# Patient Record
Sex: MALE | Race: WHITE | URBAN - METROPOLITAN AREA
[De-identification: names, ages, dates, MRNs, and addresses within clinical notes are randomized per-mention and may not be internally consistent; named-entity substitution may affect disease eponyms.]

---

## 2018-06-18 ENCOUNTER — APPOINTMENT (RX ONLY)
Dept: URBAN - METROPOLITAN AREA CLINIC 299 | Facility: CLINIC | Age: 48
Setting detail: DERMATOLOGY
End: 2018-06-18

## 2018-06-18 VITALS — HEIGHT: 74 IN | WEIGHT: 315 LBS

## 2018-06-18 DIAGNOSIS — D22 MELANOCYTIC NEVI: ICD-10-CM

## 2018-06-18 DIAGNOSIS — L82.1 OTHER SEBORRHEIC KERATOSIS: ICD-10-CM

## 2018-06-18 DIAGNOSIS — B07.8 OTHER VIRAL WARTS: ICD-10-CM

## 2018-06-18 DIAGNOSIS — L81.4 OTHER MELANIN HYPERPIGMENTATION: ICD-10-CM

## 2018-06-18 DIAGNOSIS — L91.8 OTHER HYPERTROPHIC DISORDERS OF THE SKIN: ICD-10-CM

## 2018-06-18 PROBLEM — I10 ESSENTIAL (PRIMARY) HYPERTENSION: Status: ACTIVE | Noted: 2018-06-18

## 2018-06-18 PROBLEM — D22.5 MELANOCYTIC NEVI OF TRUNK: Status: ACTIVE | Noted: 2018-06-18

## 2018-06-18 PROCEDURE — ? LIQUID NITROGEN (COSMETIC)

## 2018-06-18 PROCEDURE — ? RECOMMENDATIONS

## 2018-06-18 PROCEDURE — ? SKIN TAG REMOVAL (COSMETIC)

## 2018-06-18 PROCEDURE — 99203 OFFICE O/P NEW LOW 30 MIN: CPT

## 2018-06-18 PROCEDURE — ? COUNSELING

## 2018-06-18 PROCEDURE — ? NOTED ON EXAM BUT NOT TREATED

## 2018-06-18 ASSESSMENT — LOCATION SIMPLE DESCRIPTION DERM
LOCATION SIMPLE: POSTERIOR NECK
LOCATION SIMPLE: RIGHT CHEEK
LOCATION SIMPLE: LEFT ANTERIOR NECK
LOCATION SIMPLE: RIGHT SHOULDER
LOCATION SIMPLE: LEFT SHOULDER
LOCATION SIMPLE: RIGHT TEMPLE
LOCATION SIMPLE: RIGHT FOREHEAD
LOCATION SIMPLE: RIGHT ANTERIOR NECK
LOCATION SIMPLE: ABDOMEN
LOCATION SIMPLE: RIGHT UPPER BACK

## 2018-06-18 ASSESSMENT — LOCATION DETAILED DESCRIPTION DERM
LOCATION DETAILED: LEFT POSTERIOR SHOULDER
LOCATION DETAILED: LEFT INFERIOR POSTERIOR NECK
LOCATION DETAILED: RIGHT CLAVICULAR NECK
LOCATION DETAILED: RIGHT SUPERIOR MEDIAL FOREHEAD
LOCATION DETAILED: RIGHT MEDIAL TEMPLE
LOCATION DETAILED: LEFT SUPERIOR LATERAL NECK
LOCATION DETAILED: RIGHT INFERIOR MEDIAL UPPER BACK
LOCATION DETAILED: RIGHT MEDIAL MALAR CHEEK
LOCATION DETAILED: RIGHT INFERIOR FOREHEAD
LOCATION DETAILED: LEFT INFERIOR LATERAL NECK
LOCATION DETAILED: RIGHT CENTRAL TEMPLE
LOCATION DETAILED: RIGHT POSTERIOR SHOULDER
LOCATION DETAILED: LEFT SUPERIOR ANTERIOR NECK
LOCATION DETAILED: LEFT CLAVICULAR NECK
LOCATION DETAILED: EPIGASTRIC SKIN
LOCATION DETAILED: RIGHT SUPERIOR MEDIAL MALAR CHEEK
LOCATION DETAILED: RIGHT INFERIOR LATERAL NECK
LOCATION DETAILED: RIGHT SUPERIOR LATERAL NECK

## 2018-06-18 ASSESSMENT — LOCATION ZONE DERM
LOCATION ZONE: FACE
LOCATION ZONE: TRUNK
LOCATION ZONE: NECK
LOCATION ZONE: ARM

## 2018-06-18 NOTE — PROCEDURE: RECOMMENDATIONS
Detail Level: Zone
Recommendation Preamble: The following recommendations were made during the visit:
Recommendations (Free Text): Yearly skin checks

## 2018-06-18 NOTE — PROCEDURE: SKIN TAG REMOVAL (COSMETIC)
Consent: Written consent obtained and the risks of skin tag removal was reviewed with the patient including but not limited to bleeding, pigmentary change, infection, pain, and remote possibility of scarring. Discussed that lesions appear as classic, benign skin tags and will not be sent for pathological evaluation - patient verbally expressed understanding and agrees to proceed.

## 2018-06-18 NOTE — PROCEDURE: SKIN TAG REMOVAL (COSMETIC)
Price (Use Numbers Only, No Special Characters Or $): 890 Price (Use Numbers Only, No Special Characters Or $): 069

## 2018-06-18 NOTE — PROCEDURE: MIPS QUALITY
Quality 130: Documentation Of Current Medications In The Medical Record: Current Medications Documented
Quality 226: Preventive Care And Screening: Tobacco Use: Screening And Cessation Intervention: Patient screened for tobacco and never smoked
Quality 431: Preventive Care And Screening: Unhealthy Alcohol Use - Screening: Patient screened for unhealthy alcohol use using a single question and scores less than 2 times per year
Quality 128: Preventive Care And Screening: Body Mass Index (Bmi) Screening And Follow-Up Plan: BMI documented as out of range, follow up plan not documented, reason not otherwise specified.
Detail Level: Detailed

## 2018-06-18 NOTE — PROCEDURE: LIQUID NITROGEN (COSMETIC)
Post-Care Instructions: I reviewed with the patient in detail post-care instructions. Patient is to wear sunprotection, and avoid picking at any of the treated lesions. Pt may apply Vaseline to crusted or scabbing areas.
Duration Of Freeze Thaw-Cycle (Seconds): 5
Total Number Of Lesions Treated: 3
Detail Level: Zone
Consent: The patient's consent was obtained including but not limited to risks of crusting, scabbing, blistering, scarring, darker or lighter pigmentary change, recurrence, incomplete removal and infection.
Consent: The patient's consent was obtained including but not limited to risks of crusting, scabbing, blistering, scarring, darker or lighter pigmentary change, recurrence, incomplete removal and infection. The patient understands that the procedure is cosmetic in nature and is not covered by insurance.
Number Of Freeze-Thaw Cycles: 2 freeze-thaw cycles
Detail Level: Detailed
Render Post-Care Instructions In Note?: no

## 2018-10-04 ENCOUNTER — AMBULATORY - HEALTHEAST (OUTPATIENT)
Dept: LAB | Facility: CLINIC | Age: 48
End: 2018-10-04

## 2018-10-04 ENCOUNTER — OFFICE VISIT - HEALTHEAST (OUTPATIENT)
Dept: FAMILY MEDICINE | Facility: CLINIC | Age: 48
End: 2018-10-04

## 2018-10-04 ENCOUNTER — COMMUNICATION - HEALTHEAST (OUTPATIENT)
Dept: TELEHEALTH | Facility: CLINIC | Age: 48
End: 2018-10-04

## 2018-10-04 DIAGNOSIS — Z13.21 SCREENING FOR ENDOCRINE, NUTRITIONAL, METABOLIC AND IMMUNITY DISORDER: ICD-10-CM

## 2018-10-04 DIAGNOSIS — E66.01 CLASS 3 SEVERE OBESITY DUE TO EXCESS CALORIES WITH SERIOUS COMORBIDITY AND BODY MASS INDEX (BMI) OF 45.0 TO 49.9 IN ADULT (H): ICD-10-CM

## 2018-10-04 DIAGNOSIS — Z13.228 SCREENING FOR ENDOCRINE, NUTRITIONAL, METABOLIC AND IMMUNITY DISORDER: ICD-10-CM

## 2018-10-04 DIAGNOSIS — Z71.9 HEALTH EDUCATION: ICD-10-CM

## 2018-10-04 DIAGNOSIS — E88.810 METABOLIC SYNDROME: ICD-10-CM

## 2018-10-04 DIAGNOSIS — R06.83 SNORES: ICD-10-CM

## 2018-10-04 DIAGNOSIS — Z13.0 SCREENING FOR ENDOCRINE, NUTRITIONAL, METABOLIC AND IMMUNITY DISORDER: ICD-10-CM

## 2018-10-04 DIAGNOSIS — Z13.0 SCREENING FOR DEFICIENCY ANEMIA: ICD-10-CM

## 2018-10-04 DIAGNOSIS — E55.9 AVITAMINOSIS D: ICD-10-CM

## 2018-10-04 DIAGNOSIS — R73.03 PREDIABETES: ICD-10-CM

## 2018-10-04 DIAGNOSIS — R63.5 ABNORMAL WEIGHT GAIN: ICD-10-CM

## 2018-10-04 DIAGNOSIS — I10 BENIGN ESSENTIAL HYPERTENSION: ICD-10-CM

## 2018-10-04 DIAGNOSIS — Z13.29 SCREENING FOR ENDOCRINE, NUTRITIONAL, METABOLIC AND IMMUNITY DISORDER: ICD-10-CM

## 2018-10-04 DIAGNOSIS — Z13.29 SCREENING FOR THYROID DISORDER: ICD-10-CM

## 2018-10-04 DIAGNOSIS — Z13.220 SCREENING FOR LIPID DISORDERS: ICD-10-CM

## 2018-10-04 DIAGNOSIS — E66.813 CLASS 3 SEVERE OBESITY DUE TO EXCESS CALORIES WITH SERIOUS COMORBIDITY AND BODY MASS INDEX (BMI) OF 45.0 TO 49.9 IN ADULT (H): ICD-10-CM

## 2018-10-04 LAB
ALBUMIN SERPL-MCNC: 3.6 G/DL (ref 3.5–5)
ALP SERPL-CCNC: 76 U/L (ref 45–120)
ALT SERPL W P-5'-P-CCNC: 27 U/L (ref 0–45)
ANION GAP SERPL CALCULATED.3IONS-SCNC: 11 MMOL/L (ref 5–18)
AST SERPL W P-5'-P-CCNC: 22 U/L (ref 0–40)
ATRIAL RATE - MUSE: 75 BPM
BILIRUB SERPL-MCNC: 0.8 MG/DL (ref 0–1)
BUN SERPL-MCNC: 10 MG/DL (ref 8–22)
CALCIUM SERPL-MCNC: 9.2 MG/DL (ref 8.5–10.5)
CHLORIDE BLD-SCNC: 103 MMOL/L (ref 98–107)
CHOLEST SERPL-MCNC: 176 MG/DL
CO2 SERPL-SCNC: 27 MMOL/L (ref 22–31)
CREAT SERPL-MCNC: 0.82 MG/DL (ref 0.7–1.3)
DIASTOLIC BLOOD PRESSURE - MUSE: NORMAL MMHG
ERYTHROCYTE [DISTWIDTH] IN BLOOD BY AUTOMATED COUNT: 12.4 % (ref 11–14.5)
FASTING STATUS PATIENT QL REPORTED: NO
GFR SERPL CREATININE-BSD FRML MDRD: >60 ML/MIN/1.73M2
GLUCOSE BLD-MCNC: 146 MG/DL (ref 70–125)
HBA1C MFR BLD: 6.1 % (ref 3.5–6)
HCT VFR BLD AUTO: 46.1 % (ref 40–54)
HDLC SERPL-MCNC: 38 MG/DL
HGB BLD-MCNC: 15.4 G/DL (ref 14–18)
INTERPRETATION ECG - MUSE: NORMAL
LDLC SERPL CALC-MCNC: 108 MG/DL
MCH RBC QN AUTO: 29 PG (ref 27–34)
MCHC RBC AUTO-ENTMCNC: 33.5 G/DL (ref 32–36)
MCV RBC AUTO: 87 FL (ref 80–100)
P AXIS - MUSE: 23 DEGREES
PLATELET # BLD AUTO: 218 THOU/UL (ref 140–440)
PMV BLD AUTO: 7.9 FL (ref 7–10)
POTASSIUM BLD-SCNC: 3.7 MMOL/L (ref 3.5–5)
PR INTERVAL - MUSE: 180 MS
PROT SERPL-MCNC: 6.5 G/DL (ref 6–8)
QRS DURATION - MUSE: 96 MS
QT - MUSE: 382 MS
QTC - MUSE: 426 MS
R AXIS - MUSE: 22 DEGREES
RBC # BLD AUTO: 5.31 MILL/UL (ref 4.4–6.2)
SODIUM SERPL-SCNC: 141 MMOL/L (ref 136–145)
SYSTOLIC BLOOD PRESSURE - MUSE: NORMAL MMHG
T AXIS - MUSE: 14 DEGREES
TRIGL SERPL-MCNC: 150 MG/DL
TSH SERPL DL<=0.005 MIU/L-ACNC: 1.9 UIU/ML (ref 0.3–5)
VENTRICULAR RATE- MUSE: 75 BPM
WBC: 6 THOU/UL (ref 4–11)

## 2018-10-04 ASSESSMENT — MIFFLIN-ST. JEOR: SCORE: 2541.83

## 2018-10-04 NOTE — ASSESSMENT & PLAN NOTE
48 y.o. male in clinic today to discuss treatment of the following conditions through radical diet change, lifestyle modification and weight loss:  1. Class 3 severe obesity due to excess calories with serious comorbidity and body mass index (BMI) of 45.0 to 49.9 in adult (H)    2. Benign essential hypertension    3. Snores    4. Prediabetes    5. Screening for endocrine, nutritional, metabolic and immunity disorder    6. Screening for deficiency anemia    7. Screening for lipid disorders    8. Screening for thyroid disorder      This patient has multiple factors that resulted in struggles to lose weight.  He has metabolic syndrome (obesity, hypertension, prediabetes).  He is extremely high risk for sleep apnea.  Stress has been high.  We discussed behavioral interventions as well as pharmacologic support.  -Concern for binge eating disorder or night eating syndrome.  Continue to monitor.  - Trial of diethylpropion.  - Resume metformin but at a lower dose.  I recommend 1000 mg daily.    -Referral to sleep medicine  -Return to clinic in 1 month.

## 2018-10-05 ENCOUNTER — COMMUNICATION - HEALTHEAST (OUTPATIENT)
Dept: FAMILY MEDICINE | Facility: CLINIC | Age: 48
End: 2018-10-05

## 2018-10-05 LAB
25(OH)D3 SERPL-MCNC: 24.5 NG/ML (ref 30–80)
25(OH)D3 SERPL-MCNC: 24.5 NG/ML (ref 30–80)

## 2018-10-15 ENCOUNTER — AMBULATORY - HEALTHEAST (OUTPATIENT)
Dept: NURSING | Facility: CLINIC | Age: 48
End: 2018-10-15

## 2018-11-07 ENCOUNTER — OFFICE VISIT - HEALTHEAST (OUTPATIENT)
Dept: FAMILY MEDICINE | Facility: CLINIC | Age: 48
End: 2018-11-07

## 2018-11-07 DIAGNOSIS — E66.01 CLASS 3 SEVERE OBESITY DUE TO EXCESS CALORIES WITH SERIOUS COMORBIDITY AND BODY MASS INDEX (BMI) OF 40.0 TO 44.9 IN ADULT (H): ICD-10-CM

## 2018-11-07 DIAGNOSIS — R06.83 SNORES: ICD-10-CM

## 2018-11-07 DIAGNOSIS — I10 BENIGN ESSENTIAL HYPERTENSION: ICD-10-CM

## 2018-11-07 DIAGNOSIS — R60.9 SWELLING: ICD-10-CM

## 2018-11-07 DIAGNOSIS — R73.03 PREDIABETES: ICD-10-CM

## 2018-11-07 DIAGNOSIS — E88.810 METABOLIC SYNDROME: ICD-10-CM

## 2018-11-07 DIAGNOSIS — E66.813 CLASS 3 SEVERE OBESITY DUE TO EXCESS CALORIES WITH SERIOUS COMORBIDITY AND BODY MASS INDEX (BMI) OF 40.0 TO 44.9 IN ADULT (H): ICD-10-CM

## 2018-11-07 DIAGNOSIS — E55.9 AVITAMINOSIS D: ICD-10-CM

## 2018-11-07 NOTE — ASSESSMENT & PLAN NOTE
48 y.o. year old male in clinic today to discuss treatment of the following conditions through diet and lifestyle modification and weight loss:  1. Class 3 severe obesity due to excess calories with serious comorbidity and body mass index (BMI) of 45.0 to 49.9 in adult (H)    2. Benign essential hypertension    3. Avitaminosis D    4. Metabolic syndrome    5. Prediabetes    6. Snores    7. Swelling      The patient's weight loss result since the last visit was successful as evidenced by weight loss and improvement of meal plans.   - no side effects from medications.   - improve menu planning and water consumption   - target 50-75 net carbs   - goals: lose weight.  More purposeful with food intake. Sustained weight loss.   - exercise: limited currently because of right side pain.   Consider PT referral.    - will sleep medicine later this month.

## 2018-11-21 ENCOUNTER — OFFICE VISIT - HEALTHEAST (OUTPATIENT)
Dept: FAMILY MEDICINE | Facility: CLINIC | Age: 48
End: 2018-11-21

## 2018-11-21 DIAGNOSIS — J01.01 ACUTE RECURRENT MAXILLARY SINUSITIS: ICD-10-CM

## 2018-11-27 ENCOUNTER — OFFICE VISIT - HEALTHEAST (OUTPATIENT)
Dept: SLEEP MEDICINE | Facility: CLINIC | Age: 48
End: 2018-11-27

## 2018-11-27 DIAGNOSIS — G47.8 SLEEP DYSFUNCTION WITH SLEEP STAGE DISTURBANCE: ICD-10-CM

## 2018-11-27 DIAGNOSIS — G47.10 HYPERSOMNIA: ICD-10-CM

## 2018-11-27 DIAGNOSIS — E66.01 CLASS 3 SEVERE OBESITY WITHOUT SERIOUS COMORBIDITY WITH BODY MASS INDEX (BMI) OF 40.0 TO 44.9 IN ADULT, UNSPECIFIED OBESITY TYPE (H): ICD-10-CM

## 2018-11-27 DIAGNOSIS — E66.813 CLASS 3 SEVERE OBESITY WITHOUT SERIOUS COMORBIDITY WITH BODY MASS INDEX (BMI) OF 40.0 TO 44.9 IN ADULT, UNSPECIFIED OBESITY TYPE (H): ICD-10-CM

## 2018-11-27 DIAGNOSIS — R06.83 SNORING: ICD-10-CM

## 2018-11-27 ASSESSMENT — MIFFLIN-ST. JEOR: SCORE: 2446.57

## 2018-12-07 ENCOUNTER — COMMUNICATION - HEALTHEAST (OUTPATIENT)
Dept: FAMILY MEDICINE | Facility: CLINIC | Age: 48
End: 2018-12-07

## 2018-12-07 ENCOUNTER — OFFICE VISIT - HEALTHEAST (OUTPATIENT)
Dept: FAMILY MEDICINE | Facility: CLINIC | Age: 48
End: 2018-12-07

## 2018-12-07 DIAGNOSIS — I10 BENIGN ESSENTIAL HYPERTENSION: ICD-10-CM

## 2018-12-07 DIAGNOSIS — E88.810 METABOLIC SYNDROME: ICD-10-CM

## 2018-12-07 DIAGNOSIS — E66.01 CLASS 3 SEVERE OBESITY DUE TO EXCESS CALORIES WITH SERIOUS COMORBIDITY AND BODY MASS INDEX (BMI) OF 40.0 TO 44.9 IN ADULT (H): ICD-10-CM

## 2018-12-07 DIAGNOSIS — R73.03 PREDIABETES: ICD-10-CM

## 2018-12-07 DIAGNOSIS — E55.9 AVITAMINOSIS D: ICD-10-CM

## 2018-12-07 DIAGNOSIS — R60.9 SWELLING: ICD-10-CM

## 2018-12-07 DIAGNOSIS — R06.83 SNORES: ICD-10-CM

## 2018-12-07 DIAGNOSIS — E66.813 CLASS 3 SEVERE OBESITY DUE TO EXCESS CALORIES WITH SERIOUS COMORBIDITY AND BODY MASS INDEX (BMI) OF 40.0 TO 44.9 IN ADULT (H): ICD-10-CM

## 2018-12-07 NOTE — ASSESSMENT & PLAN NOTE
48 y.o. year old male in clinic today to discuss treatment of the following conditions through diet and lifestyle modification and weight loss:  1. Class 3 severe obesity due to excess calories with serious comorbidity and body mass index (BMI) of 40.0 to 44.9 in adult (H)    2. Benign essential hypertension    3. Avitaminosis D    4. Metabolic syndrome    5. Prediabetes    6. Snores    7. Swelling      The patient's weight loss result since the last visit was successful as evidenced by ongoing weight loss.   - goal: make it through the holiday and navigate mother(s) visit   - continue diethylpropion   - continue to learn new tricks/tools.  Add variety.  Improved meals.    - continue metformin

## 2018-12-16 ENCOUNTER — RECORDS - HEALTHEAST (OUTPATIENT)
Dept: SLEEP MEDICINE | Facility: CLINIC | Age: 48
End: 2018-12-16

## 2018-12-16 ENCOUNTER — RECORDS - HEALTHEAST (OUTPATIENT)
Dept: ADMINISTRATIVE | Facility: OTHER | Age: 48
End: 2018-12-16

## 2018-12-16 DIAGNOSIS — R06.83 SNORING: ICD-10-CM

## 2018-12-16 DIAGNOSIS — G47.10 HYPERSOMNIA, UNSPECIFIED: ICD-10-CM

## 2018-12-16 DIAGNOSIS — G47.8 OTHER SLEEP DISORDERS: ICD-10-CM

## 2018-12-19 ENCOUNTER — COMMUNICATION - HEALTHEAST (OUTPATIENT)
Dept: SLEEP MEDICINE | Facility: CLINIC | Age: 48
End: 2018-12-19

## 2018-12-21 ENCOUNTER — COMMUNICATION - HEALTHEAST (OUTPATIENT)
Dept: SLEEP MEDICINE | Facility: CLINIC | Age: 48
End: 2018-12-21

## 2018-12-27 ENCOUNTER — COMMUNICATION - HEALTHEAST (OUTPATIENT)
Dept: SLEEP MEDICINE | Facility: CLINIC | Age: 48
End: 2018-12-27

## 2018-12-31 ENCOUNTER — AMBULATORY - HEALTHEAST (OUTPATIENT)
Dept: SLEEP MEDICINE | Facility: CLINIC | Age: 48
End: 2018-12-31

## 2018-12-31 ENCOUNTER — COMMUNICATION - HEALTHEAST (OUTPATIENT)
Dept: ADMINISTRATIVE | Facility: CLINIC | Age: 48
End: 2018-12-31

## 2018-12-31 ENCOUNTER — COMMUNICATION - HEALTHEAST (OUTPATIENT)
Dept: SLEEP MEDICINE | Facility: CLINIC | Age: 48
End: 2018-12-31

## 2018-12-31 DIAGNOSIS — G47.33 OSA (OBSTRUCTIVE SLEEP APNEA): ICD-10-CM

## 2019-01-04 ENCOUNTER — AMBULATORY - HEALTHEAST (OUTPATIENT)
Dept: SLEEP MEDICINE | Facility: CLINIC | Age: 49
End: 2019-01-04

## 2019-01-08 ENCOUNTER — OFFICE VISIT - HEALTHEAST (OUTPATIENT)
Dept: FAMILY MEDICINE | Facility: CLINIC | Age: 49
End: 2019-01-08

## 2019-01-08 DIAGNOSIS — E66.813 CLASS 3 SEVERE OBESITY DUE TO EXCESS CALORIES WITH SERIOUS COMORBIDITY AND BODY MASS INDEX (BMI) OF 40.0 TO 44.9 IN ADULT (H): ICD-10-CM

## 2019-01-08 DIAGNOSIS — E66.01 CLASS 3 SEVERE OBESITY DUE TO EXCESS CALORIES WITH SERIOUS COMORBIDITY AND BODY MASS INDEX (BMI) OF 40.0 TO 44.9 IN ADULT (H): ICD-10-CM

## 2019-01-08 DIAGNOSIS — R06.83 SNORES: ICD-10-CM

## 2019-01-08 DIAGNOSIS — E88.810 METABOLIC SYNDROME: ICD-10-CM

## 2019-01-08 DIAGNOSIS — R73.03 PREDIABETES: ICD-10-CM

## 2019-01-08 DIAGNOSIS — R60.9 SWELLING: ICD-10-CM

## 2019-01-08 DIAGNOSIS — E55.9 AVITAMINOSIS D: ICD-10-CM

## 2019-01-08 DIAGNOSIS — I10 BENIGN ESSENTIAL HYPERTENSION: ICD-10-CM

## 2019-01-08 NOTE — ASSESSMENT & PLAN NOTE
48 y.o. year old male in clinic today to discuss treatment of the following conditions through diet and lifestyle modification and weight loss:  1. Class 3 severe obesity due to excess calories with serious comorbidity and body mass index (BMI) of 40.0 to 44.9 in adult (H)    2. Benign essential hypertension    3. Avitaminosis D    4. Metabolic syndrome    5. Prediabetes    6. Snores    7. Swelling      The patient's weight loss result since the last visit was successful as evidenced by weight loss despite holidays.    -continue metformin or  -continue diethylpropion.  We discussed dietary goals.  The patient is consuming adequate nutrition.

## 2019-01-16 ENCOUNTER — COMMUNICATION - HEALTHEAST (OUTPATIENT)
Dept: FAMILY MEDICINE | Facility: CLINIC | Age: 49
End: 2019-01-16

## 2019-01-16 DIAGNOSIS — R20.2 NUMBNESS AND TINGLING OF FOOT: ICD-10-CM

## 2019-01-16 DIAGNOSIS — M54.9 BACK PAIN, UNSPECIFIED BACK LOCATION, UNSPECIFIED BACK PAIN LATERALITY, UNSPECIFIED CHRONICITY: ICD-10-CM

## 2019-01-16 DIAGNOSIS — R20.0 NUMBNESS AND TINGLING OF FOOT: ICD-10-CM

## 2019-01-24 ENCOUNTER — OFFICE VISIT - HEALTHEAST (OUTPATIENT)
Dept: PHYSICAL THERAPY | Facility: REHABILITATION | Age: 49
End: 2019-01-24

## 2019-01-24 DIAGNOSIS — M54.10 RADICULAR LEG PAIN: ICD-10-CM

## 2019-01-24 DIAGNOSIS — M62.81 GENERALIZED MUSCLE WEAKNESS: ICD-10-CM

## 2019-01-24 DIAGNOSIS — R20.0 RIGHT LEG NUMBNESS: ICD-10-CM

## 2019-01-29 ENCOUNTER — COMMUNICATION - HEALTHEAST (OUTPATIENT)
Dept: PHYSICAL THERAPY | Facility: REHABILITATION | Age: 49
End: 2019-01-29

## 2019-01-29 ENCOUNTER — COMMUNICATION - HEALTHEAST (OUTPATIENT)
Dept: FAMILY MEDICINE | Facility: CLINIC | Age: 49
End: 2019-01-29

## 2019-01-29 DIAGNOSIS — R60.9 SWELLING: ICD-10-CM

## 2019-01-29 DIAGNOSIS — R73.03 PREDIABETES: ICD-10-CM

## 2019-01-29 DIAGNOSIS — R06.83 SNORES: ICD-10-CM

## 2019-01-29 DIAGNOSIS — E66.01 CLASS 3 SEVERE OBESITY DUE TO EXCESS CALORIES WITH SERIOUS COMORBIDITY AND BODY MASS INDEX (BMI) OF 40.0 TO 44.9 IN ADULT (H): ICD-10-CM

## 2019-01-29 DIAGNOSIS — E66.813 CLASS 3 SEVERE OBESITY DUE TO EXCESS CALORIES WITH SERIOUS COMORBIDITY AND BODY MASS INDEX (BMI) OF 40.0 TO 44.9 IN ADULT (H): ICD-10-CM

## 2019-01-29 DIAGNOSIS — E88.810 METABOLIC SYNDROME: ICD-10-CM

## 2019-01-29 DIAGNOSIS — E55.9 AVITAMINOSIS D: ICD-10-CM

## 2019-01-29 DIAGNOSIS — I10 BENIGN ESSENTIAL HYPERTENSION: ICD-10-CM

## 2019-02-11 ENCOUNTER — OFFICE VISIT - HEALTHEAST (OUTPATIENT)
Dept: PHYSICAL THERAPY | Facility: REHABILITATION | Age: 49
End: 2019-02-11

## 2019-02-11 DIAGNOSIS — M54.10 RADICULAR LEG PAIN: ICD-10-CM

## 2019-02-11 DIAGNOSIS — R20.0 RIGHT LEG NUMBNESS: ICD-10-CM

## 2019-02-11 DIAGNOSIS — M62.81 GENERALIZED MUSCLE WEAKNESS: ICD-10-CM

## 2019-02-15 ENCOUNTER — OFFICE VISIT - HEALTHEAST (OUTPATIENT)
Dept: FAMILY MEDICINE | Facility: CLINIC | Age: 49
End: 2019-02-15

## 2019-02-15 DIAGNOSIS — R73.03 PREDIABETES: ICD-10-CM

## 2019-02-15 DIAGNOSIS — E88.810 METABOLIC SYNDROME: ICD-10-CM

## 2019-02-15 DIAGNOSIS — E66.01 CLASS 3 SEVERE OBESITY DUE TO EXCESS CALORIES WITH SERIOUS COMORBIDITY AND BODY MASS INDEX (BMI) OF 40.0 TO 44.9 IN ADULT (H): ICD-10-CM

## 2019-02-15 DIAGNOSIS — E66.813 CLASS 3 SEVERE OBESITY DUE TO EXCESS CALORIES WITH SERIOUS COMORBIDITY AND BODY MASS INDEX (BMI) OF 40.0 TO 44.9 IN ADULT (H): ICD-10-CM

## 2019-02-15 DIAGNOSIS — I10 BENIGN ESSENTIAL HYPERTENSION: ICD-10-CM

## 2019-02-15 DIAGNOSIS — E55.9 AVITAMINOSIS D: ICD-10-CM

## 2019-02-15 DIAGNOSIS — R06.83 SNORES: ICD-10-CM

## 2019-02-15 LAB — HBA1C MFR BLD: 5.9 % (ref 3.5–6)

## 2019-02-15 NOTE — ASSESSMENT & PLAN NOTE
48 y.o. year old male in clinic today to discuss treatment of the following conditions through diet and lifestyle modification and weight loss:  1. Class 3 severe obesity due to excess calories with serious comorbidity and body mass index (BMI) of 40.0 to 44.9 in adult (H)    2. Prediabetes    3. Benign essential hypertension    4. Snores    5. Metabolic syndrome    6. Avitaminosis D      The patient's weight loss result since the last visit was mixed based on weight stability.  He describes many positive behaviors and is planning to be more intentional.  We discussed a more aggressive medication approach and he declined topiramate and liraglutide. Continue metformin.  Check A1c.  Return to clinic in one month.

## 2019-02-18 LAB
25(OH)D3 SERPL-MCNC: 26.6 NG/ML (ref 30–80)
25(OH)D3 SERPL-MCNC: 26.6 NG/ML (ref 30–80)

## 2019-02-25 ENCOUNTER — OFFICE VISIT - HEALTHEAST (OUTPATIENT)
Dept: PHYSICAL THERAPY | Facility: REHABILITATION | Age: 49
End: 2019-02-25

## 2019-02-25 DIAGNOSIS — M62.81 GENERALIZED MUSCLE WEAKNESS: ICD-10-CM

## 2019-02-25 DIAGNOSIS — M54.10 RADICULAR LEG PAIN: ICD-10-CM

## 2019-02-25 DIAGNOSIS — R20.0 RIGHT LEG NUMBNESS: ICD-10-CM

## 2019-03-12 ENCOUNTER — COMMUNICATION - HEALTHEAST (OUTPATIENT)
Dept: FAMILY MEDICINE | Facility: CLINIC | Age: 49
End: 2019-03-12

## 2019-03-12 DIAGNOSIS — E55.9 AVITAMINOSIS D: ICD-10-CM

## 2019-03-12 DIAGNOSIS — I10 BENIGN ESSENTIAL HYPERTENSION: ICD-10-CM

## 2019-03-12 DIAGNOSIS — R60.9 SWELLING: ICD-10-CM

## 2019-03-12 DIAGNOSIS — E66.813 CLASS 3 SEVERE OBESITY DUE TO EXCESS CALORIES WITH SERIOUS COMORBIDITY AND BODY MASS INDEX (BMI) OF 40.0 TO 44.9 IN ADULT (H): ICD-10-CM

## 2019-03-12 DIAGNOSIS — E66.01 CLASS 3 SEVERE OBESITY DUE TO EXCESS CALORIES WITH SERIOUS COMORBIDITY AND BODY MASS INDEX (BMI) OF 40.0 TO 44.9 IN ADULT (H): ICD-10-CM

## 2019-03-12 DIAGNOSIS — R06.83 SNORES: ICD-10-CM

## 2019-03-12 DIAGNOSIS — E88.810 METABOLIC SYNDROME: ICD-10-CM

## 2019-03-12 DIAGNOSIS — R73.03 PREDIABETES: ICD-10-CM

## 2019-03-15 ENCOUNTER — COMMUNICATION - HEALTHEAST (OUTPATIENT)
Dept: FAMILY MEDICINE | Facility: CLINIC | Age: 49
End: 2019-03-15

## 2019-03-15 DIAGNOSIS — E88.810 METABOLIC SYNDROME: ICD-10-CM

## 2019-03-15 DIAGNOSIS — R60.9 SWELLING: ICD-10-CM

## 2019-03-15 DIAGNOSIS — E66.813 CLASS 3 SEVERE OBESITY DUE TO EXCESS CALORIES WITH SERIOUS COMORBIDITY AND BODY MASS INDEX (BMI) OF 40.0 TO 44.9 IN ADULT (H): ICD-10-CM

## 2019-03-15 DIAGNOSIS — E55.9 AVITAMINOSIS D: ICD-10-CM

## 2019-03-15 DIAGNOSIS — R73.03 PREDIABETES: ICD-10-CM

## 2019-03-15 DIAGNOSIS — I10 BENIGN ESSENTIAL HYPERTENSION: ICD-10-CM

## 2019-03-15 DIAGNOSIS — E66.01 CLASS 3 SEVERE OBESITY DUE TO EXCESS CALORIES WITH SERIOUS COMORBIDITY AND BODY MASS INDEX (BMI) OF 40.0 TO 44.9 IN ADULT (H): ICD-10-CM

## 2019-03-15 DIAGNOSIS — R06.83 SNORES: ICD-10-CM

## 2019-03-18 ENCOUNTER — OFFICE VISIT - HEALTHEAST (OUTPATIENT)
Dept: FAMILY MEDICINE | Facility: CLINIC | Age: 49
End: 2019-03-18

## 2019-03-18 DIAGNOSIS — E66.813 CLASS 3 SEVERE OBESITY DUE TO EXCESS CALORIES WITH SERIOUS COMORBIDITY AND BODY MASS INDEX (BMI) OF 40.0 TO 44.9 IN ADULT (H): ICD-10-CM

## 2019-03-18 DIAGNOSIS — R73.03 PREDIABETES: ICD-10-CM

## 2019-03-18 DIAGNOSIS — E66.01 CLASS 3 SEVERE OBESITY DUE TO EXCESS CALORIES WITH SERIOUS COMORBIDITY AND BODY MASS INDEX (BMI) OF 40.0 TO 44.9 IN ADULT (H): ICD-10-CM

## 2019-03-18 DIAGNOSIS — E88.810 METABOLIC SYNDROME: ICD-10-CM

## 2019-03-18 DIAGNOSIS — J01.01 ACUTE RECURRENT MAXILLARY SINUSITIS: ICD-10-CM

## 2019-03-18 NOTE — ASSESSMENT & PLAN NOTE
48 y.o. year old male in clinic today to discuss treatment of the following conditions through diet and lifestyle modification and weight loss:  1. Acute recurrent maxillary sinusitis    2. Class 3 severe obesity due to excess calories with serious comorbidity and body mass index (BMI) of 40.0 to 44.9 in adult (H)    3. Prediabetes    4. syndrome      The patient's weight loss result since the lastvisit was mixed based on weight stability.    - night eating syndrome?  Discussed sertraline, topiramate.  Maintain current plan for the next month. Increase activity.     - continue diethylpropion    Follow-up: 1 months

## 2019-03-27 ENCOUNTER — COMMUNICATION - HEALTHEAST (OUTPATIENT)
Dept: PHYSICAL THERAPY | Facility: REHABILITATION | Age: 49
End: 2019-03-27

## 2019-04-15 ENCOUNTER — COMMUNICATION - HEALTHEAST (OUTPATIENT)
Dept: FAMILY MEDICINE | Facility: CLINIC | Age: 49
End: 2019-04-15

## 2019-04-22 ENCOUNTER — OFFICE VISIT - HEALTHEAST (OUTPATIENT)
Dept: FAMILY MEDICINE | Facility: CLINIC | Age: 49
End: 2019-04-22

## 2019-04-22 DIAGNOSIS — I10 BENIGN ESSENTIAL HYPERTENSION: ICD-10-CM

## 2019-04-22 DIAGNOSIS — E66.813 CLASS 3 SEVERE OBESITY DUE TO EXCESS CALORIES WITH SERIOUS COMORBIDITY AND BODY MASS INDEX (BMI) OF 40.0 TO 44.9 IN ADULT (H): ICD-10-CM

## 2019-04-22 DIAGNOSIS — R60.9 SWELLING: ICD-10-CM

## 2019-04-22 DIAGNOSIS — R06.83 SNORES: ICD-10-CM

## 2019-04-22 DIAGNOSIS — E88.810 METABOLIC SYNDROME: ICD-10-CM

## 2019-04-22 DIAGNOSIS — R73.03 PREDIABETES: ICD-10-CM

## 2019-04-22 DIAGNOSIS — E66.01 CLASS 3 SEVERE OBESITY DUE TO EXCESS CALORIES WITH SERIOUS COMORBIDITY AND BODY MASS INDEX (BMI) OF 40.0 TO 44.9 IN ADULT (H): ICD-10-CM

## 2019-04-22 DIAGNOSIS — E55.9 AVITAMINOSIS D: ICD-10-CM

## 2019-04-22 NOTE — ASSESSMENT & PLAN NOTE
48 y.o. year old male in clinic today to discuss treatment of the following conditions through diet and lifestyle modification and weight loss:  1. Class 3 severe obesity due to excess calories with serious comorbidity and body mass index (BMI) of 40.0 to 44.9 in adult (H)    2. Benign essential hypertension    3. Avitaminosis D    4. Metabolic syndrome    5. Prediabetes    6. Snores    7. Swelling      The patient's weight loss result since the last visit was successful based on weight stability.  Some concerns for mild depression.  He continues to struggle with late in the evening cravings and eating.  - Add sertraline at a low dose.  This is for mild irritability as well as night eating syndrome.  -Discussed sleep.  The patient was a goal to be in bed at least 8 hours per night.  -Continue diethylpropion.  This may account for some of his emotional lability.  Consider discontinuing this medication in the future visit if the patient is struggling with (and not losing weight) and consider trial lorcaserin or liraglutide.  -Return to clinic in 1 month.

## 2019-04-23 ENCOUNTER — COMMUNICATION - HEALTHEAST (OUTPATIENT)
Dept: FAMILY MEDICINE | Facility: CLINIC | Age: 49
End: 2019-04-23

## 2019-05-20 ENCOUNTER — COMMUNICATION - HEALTHEAST (OUTPATIENT)
Dept: FAMILY MEDICINE | Facility: CLINIC | Age: 49
End: 2019-05-20

## 2019-05-20 DIAGNOSIS — E66.01 CLASS 3 SEVERE OBESITY DUE TO EXCESS CALORIES WITH SERIOUS COMORBIDITY AND BODY MASS INDEX (BMI) OF 40.0 TO 44.9 IN ADULT (H): ICD-10-CM

## 2019-05-20 DIAGNOSIS — R60.9 SWELLING: ICD-10-CM

## 2019-05-20 DIAGNOSIS — E66.813 CLASS 3 SEVERE OBESITY DUE TO EXCESS CALORIES WITH SERIOUS COMORBIDITY AND BODY MASS INDEX (BMI) OF 40.0 TO 44.9 IN ADULT (H): ICD-10-CM

## 2019-05-20 DIAGNOSIS — I10 BENIGN ESSENTIAL HYPERTENSION: ICD-10-CM

## 2019-05-20 DIAGNOSIS — E88.810 METABOLIC SYNDROME: ICD-10-CM

## 2019-05-20 DIAGNOSIS — E55.9 AVITAMINOSIS D: ICD-10-CM

## 2019-05-20 DIAGNOSIS — R73.03 PREDIABETES: ICD-10-CM

## 2019-05-20 DIAGNOSIS — R06.83 SNORES: ICD-10-CM

## 2019-05-21 ENCOUNTER — COMMUNICATION - HEALTHEAST (OUTPATIENT)
Dept: FAMILY MEDICINE | Facility: CLINIC | Age: 49
End: 2019-05-21

## 2019-06-06 ENCOUNTER — OFFICE VISIT - HEALTHEAST (OUTPATIENT)
Dept: FAMILY MEDICINE | Facility: CLINIC | Age: 49
End: 2019-06-06

## 2019-06-06 DIAGNOSIS — E66.813 CLASS 3 SEVERE OBESITY DUE TO EXCESS CALORIES WITH SERIOUS COMORBIDITY AND BODY MASS INDEX (BMI) OF 40.0 TO 44.9 IN ADULT (H): ICD-10-CM

## 2019-06-06 DIAGNOSIS — I10 BENIGN ESSENTIAL HYPERTENSION: ICD-10-CM

## 2019-06-06 DIAGNOSIS — E88.810 METABOLIC SYNDROME: ICD-10-CM

## 2019-06-06 DIAGNOSIS — E55.9 AVITAMINOSIS D: ICD-10-CM

## 2019-06-06 DIAGNOSIS — R60.9 SWELLING: ICD-10-CM

## 2019-06-06 DIAGNOSIS — R06.83 SNORES: ICD-10-CM

## 2019-06-06 DIAGNOSIS — R73.03 PREDIABETES: ICD-10-CM

## 2019-06-06 DIAGNOSIS — E66.01 CLASS 3 SEVERE OBESITY DUE TO EXCESS CALORIES WITH SERIOUS COMORBIDITY AND BODY MASS INDEX (BMI) OF 40.0 TO 44.9 IN ADULT (H): ICD-10-CM

## 2019-06-06 NOTE — ASSESSMENT & PLAN NOTE
48 y.o. year old male in clinic today to discuss treatment of the following conditions through diet and lifestyle modification and weight loss:  1. Class 3 severe obesity due to excess calories with serious comorbidity and body mass index (BMI) of 40.0 to 44.9 in adult (H)    2. Benign essential hypertension    3. Avitaminosis D    4. Metabolic syndrome    5. Prediabetes    6. Snores    7. Swelling      The patient's weight loss result since the last visit was unsuccessful based on weight stability and deviations from plan.  Social calories and going out are a barrier.  Slowly he has changed portions sizes.   - he is still motivated for additional change.     - reduce stress and anxiety eating.   - take diethylpropion later in the day    Follow-up: 1 months

## 2019-06-24 ENCOUNTER — OFFICE VISIT - HEALTHEAST (OUTPATIENT)
Dept: FAMILY MEDICINE | Facility: CLINIC | Age: 49
End: 2019-06-24

## 2019-06-24 DIAGNOSIS — Z71.3 NUTRITIONAL COUNSELING: ICD-10-CM

## 2019-06-24 DIAGNOSIS — E66.01 OBESITY, CLASS III, BMI 40-49.9 (MORBID OBESITY) (H): ICD-10-CM

## 2019-06-24 DIAGNOSIS — E88.810 METABOLIC SYNDROME: ICD-10-CM

## 2019-06-24 DIAGNOSIS — R73.03 PREDIABETES: ICD-10-CM

## 2019-06-24 ASSESSMENT — MIFFLIN-ST. JEOR: SCORE: 2457.91

## 2019-07-16 ENCOUNTER — COMMUNICATION - HEALTHEAST (OUTPATIENT)
Dept: FAMILY MEDICINE | Facility: CLINIC | Age: 49
End: 2019-07-16

## 2019-07-25 ENCOUNTER — OFFICE VISIT - HEALTHEAST (OUTPATIENT)
Dept: FAMILY MEDICINE | Facility: CLINIC | Age: 49
End: 2019-07-25

## 2019-07-25 DIAGNOSIS — E55.9 AVITAMINOSIS D: ICD-10-CM

## 2019-07-25 DIAGNOSIS — R60.9 SWELLING: ICD-10-CM

## 2019-07-25 DIAGNOSIS — E66.01 CLASS 3 SEVERE OBESITY DUE TO EXCESS CALORIES WITH SERIOUS COMORBIDITY AND BODY MASS INDEX (BMI) OF 40.0 TO 44.9 IN ADULT (H): ICD-10-CM

## 2019-07-25 DIAGNOSIS — I10 BENIGN ESSENTIAL HYPERTENSION: ICD-10-CM

## 2019-07-25 DIAGNOSIS — R06.83 SNORES: ICD-10-CM

## 2019-07-25 DIAGNOSIS — R73.03 PREDIABETES: ICD-10-CM

## 2019-07-25 DIAGNOSIS — E66.813 CLASS 3 SEVERE OBESITY DUE TO EXCESS CALORIES WITH SERIOUS COMORBIDITY AND BODY MASS INDEX (BMI) OF 40.0 TO 44.9 IN ADULT (H): ICD-10-CM

## 2019-07-25 DIAGNOSIS — E88.810 METABOLIC SYNDROME: ICD-10-CM

## 2019-07-25 ASSESSMENT — MIFFLIN-ST. JEOR: SCORE: 2423.89

## 2019-07-25 NOTE — ASSESSMENT & PLAN NOTE
48 y.o. year old male in clinic today to discuss treatment of the following conditions through diet and lifestyle modification and weight loss:  1. Class 3 severe obesity due to excess calories with serious comorbidity and body mass index (BMI) of 40.0 to 44.9 in adult (H)    2. Benign essential hypertension    3. Avitaminosis D    4. Metabolic syndrome    5. Prediabetes    6. Snores    7. Swelling      The patient's weight loss result since the last visit was successful based on weight loss.  He is not convinced that zoloft has helped.   - stop zoloft   - continue metformin and diethylpropion   - increase activity

## 2019-08-26 ENCOUNTER — COMMUNICATION - HEALTHEAST (OUTPATIENT)
Dept: FAMILY MEDICINE | Facility: CLINIC | Age: 49
End: 2019-08-26

## 2019-08-26 DIAGNOSIS — E66.813 CLASS 3 SEVERE OBESITY DUE TO EXCESS CALORIES WITH SERIOUS COMORBIDITY AND BODY MASS INDEX (BMI) OF 40.0 TO 44.9 IN ADULT (H): ICD-10-CM

## 2019-08-26 DIAGNOSIS — I10 BENIGN ESSENTIAL HYPERTENSION: ICD-10-CM

## 2019-08-26 DIAGNOSIS — R06.83 SNORES: ICD-10-CM

## 2019-08-26 DIAGNOSIS — E88.810 METABOLIC SYNDROME: ICD-10-CM

## 2019-08-26 DIAGNOSIS — E66.01 CLASS 3 SEVERE OBESITY DUE TO EXCESS CALORIES WITH SERIOUS COMORBIDITY AND BODY MASS INDEX (BMI) OF 40.0 TO 44.9 IN ADULT (H): ICD-10-CM

## 2019-08-26 DIAGNOSIS — R73.03 PREDIABETES: ICD-10-CM

## 2019-08-26 DIAGNOSIS — R60.9 SWELLING: ICD-10-CM

## 2019-08-26 DIAGNOSIS — E55.9 AVITAMINOSIS D: ICD-10-CM

## 2019-08-29 ENCOUNTER — COMMUNICATION - HEALTHEAST (OUTPATIENT)
Dept: FAMILY MEDICINE | Facility: CLINIC | Age: 49
End: 2019-08-29

## 2019-09-18 ENCOUNTER — COMMUNICATION - HEALTHEAST (OUTPATIENT)
Dept: FAMILY MEDICINE | Facility: CLINIC | Age: 49
End: 2019-09-18

## 2019-10-02 ENCOUNTER — COMMUNICATION - HEALTHEAST (OUTPATIENT)
Dept: FAMILY MEDICINE | Facility: CLINIC | Age: 49
End: 2019-10-02

## 2019-10-02 DIAGNOSIS — E88.810 METABOLIC SYNDROME: ICD-10-CM

## 2019-10-02 DIAGNOSIS — E66.01 CLASS 3 SEVERE OBESITY DUE TO EXCESS CALORIES WITH SERIOUS COMORBIDITY AND BODY MASS INDEX (BMI) OF 40.0 TO 44.9 IN ADULT (H): ICD-10-CM

## 2019-10-02 DIAGNOSIS — R06.83 SNORES: ICD-10-CM

## 2019-10-02 DIAGNOSIS — E66.813 CLASS 3 SEVERE OBESITY DUE TO EXCESS CALORIES WITH SERIOUS COMORBIDITY AND BODY MASS INDEX (BMI) OF 40.0 TO 44.9 IN ADULT (H): ICD-10-CM

## 2019-10-02 DIAGNOSIS — R60.9 SWELLING: ICD-10-CM

## 2019-10-02 DIAGNOSIS — I10 BENIGN ESSENTIAL HYPERTENSION: ICD-10-CM

## 2019-10-02 DIAGNOSIS — R73.03 PREDIABETES: ICD-10-CM

## 2019-10-02 DIAGNOSIS — E55.9 AVITAMINOSIS D: ICD-10-CM

## 2019-10-04 ENCOUNTER — OFFICE VISIT - HEALTHEAST (OUTPATIENT)
Dept: FAMILY MEDICINE | Facility: CLINIC | Age: 49
End: 2019-10-04

## 2019-10-04 DIAGNOSIS — J01.90 ACUTE NON-RECURRENT SINUSITIS, UNSPECIFIED LOCATION: ICD-10-CM

## 2019-10-04 NOTE — ASSESSMENT & PLAN NOTE
ACUTE INFECTION, PERSISTENT X 10 DAYS  He tried conservative measures but they have not helped.  Incidentally he is on a Medrol Dosepak now for possible ruptured plantar fascia through his podiatrist.  This has helped his sinus symptoms somewhat but not enough.  I did review the chart and his last course of Sherrie owusu was in March 2019 and was a Z-Edgar.  Prior to that he had also had a Z-Edgar in 2018.  He tells me today that he used to have very frequent sinus infections and almost had sinus surgery when he used to live in [place].  However since he is moved to [place] his sinus infections are not as frequent.  He says over the last 12 months he is probably had 2 or 3.  He feels 2 or 3 sinus infections a year is manageable.

## 2019-10-10 ENCOUNTER — OFFICE VISIT - HEALTHEAST (OUTPATIENT)
Dept: FAMILY MEDICINE | Facility: CLINIC | Age: 49
End: 2019-10-10

## 2019-10-10 DIAGNOSIS — E66.01 CLASS 3 SEVERE OBESITY DUE TO EXCESS CALORIES WITH SERIOUS COMORBIDITY AND BODY MASS INDEX (BMI) OF 40.0 TO 44.9 IN ADULT (H): ICD-10-CM

## 2019-10-10 DIAGNOSIS — R06.83 SNORES: ICD-10-CM

## 2019-10-10 DIAGNOSIS — E55.9 AVITAMINOSIS D: ICD-10-CM

## 2019-10-10 DIAGNOSIS — Z12.11 SCREEN FOR COLON CANCER: ICD-10-CM

## 2019-10-10 DIAGNOSIS — E88.810 METABOLIC SYNDROME: ICD-10-CM

## 2019-10-10 DIAGNOSIS — I10 BENIGN ESSENTIAL HYPERTENSION: ICD-10-CM

## 2019-10-10 DIAGNOSIS — R73.03 PREDIABETES: ICD-10-CM

## 2019-10-10 DIAGNOSIS — E66.813 CLASS 3 SEVERE OBESITY DUE TO EXCESS CALORIES WITH SERIOUS COMORBIDITY AND BODY MASS INDEX (BMI) OF 40.0 TO 44.9 IN ADULT (H): ICD-10-CM

## 2019-10-10 NOTE — ASSESSMENT & PLAN NOTE
49 y.o. year old male in clinic today to discuss treatment of the following conditions through diet and lifestyle modification and weight loss:  1. Class 3 severe obesity due to excess calories with serious comorbidity and body mass index (BMI) of 40.0 to 44.9 in adult (H)    2. Screen for colon cancer    3. Benign essential hypertension    4. Prediabetes    5. Metabolic syndrome    6. Snores    7. Avitaminosis D      The patient's weight loss result since the last visit was mixed based on weight stability despite recent injury.  He continues to affirm diethylpropion as a medication that is helpful.   - continue diethylpropion.   - IF framework?  Resume.   - continue metformin   - discussed 24 week program.return to clinic in 6 weeks.    -Goals of therapy: At this point, is not clear how many changes the patient has been making on a regular basis.  However, he does aspire to be more attentive to his dietary goals over the next 6 weeks.  If he is struggling when I see him next time, I will reintroduce the possibility of surgical bariatrics.

## 2019-10-10 NOTE — ASSESSMENT & PLAN NOTE
We will plan for fasting lab work late in 2019 or early in 2020.  Anticipate interval improvement despite lack of weight loss.

## 2019-11-04 ENCOUNTER — COMMUNICATION - HEALTHEAST (OUTPATIENT)
Dept: FAMILY MEDICINE | Facility: CLINIC | Age: 49
End: 2019-11-04

## 2019-11-04 DIAGNOSIS — R73.03 PREDIABETES: ICD-10-CM

## 2019-11-04 DIAGNOSIS — R60.9 SWELLING: ICD-10-CM

## 2019-11-04 DIAGNOSIS — E88.810 METABOLIC SYNDROME: ICD-10-CM

## 2019-11-04 DIAGNOSIS — E66.01 CLASS 3 SEVERE OBESITY DUE TO EXCESS CALORIES WITH SERIOUS COMORBIDITY AND BODY MASS INDEX (BMI) OF 40.0 TO 44.9 IN ADULT (H): ICD-10-CM

## 2019-11-04 DIAGNOSIS — R06.83 SNORES: ICD-10-CM

## 2019-11-04 DIAGNOSIS — E55.9 AVITAMINOSIS D: ICD-10-CM

## 2019-11-04 DIAGNOSIS — E66.813 CLASS 3 SEVERE OBESITY DUE TO EXCESS CALORIES WITH SERIOUS COMORBIDITY AND BODY MASS INDEX (BMI) OF 40.0 TO 44.9 IN ADULT (H): ICD-10-CM

## 2019-11-04 DIAGNOSIS — I10 BENIGN ESSENTIAL HYPERTENSION: ICD-10-CM

## 2019-11-12 ENCOUNTER — COMMUNICATION - HEALTHEAST (OUTPATIENT)
Dept: FAMILY MEDICINE | Facility: CLINIC | Age: 49
End: 2019-11-12

## 2019-11-12 DIAGNOSIS — J01.90 ACUTE NON-RECURRENT SINUSITIS, UNSPECIFIED LOCATION: ICD-10-CM

## 2019-11-12 DIAGNOSIS — R73.03 PREDIABETES: ICD-10-CM

## 2019-11-20 ASSESSMENT — MIFFLIN-ST. JEOR: SCORE: 2428.43

## 2019-11-23 ENCOUNTER — OFFICE VISIT - HEALTHEAST (OUTPATIENT)
Dept: FAMILY MEDICINE | Facility: CLINIC | Age: 49
End: 2019-11-23

## 2019-11-23 DIAGNOSIS — M79.89 SOFT TISSUE MASS: ICD-10-CM

## 2019-11-25 ENCOUNTER — ANESTHESIA - HEALTHEAST (OUTPATIENT)
Dept: SURGERY | Facility: AMBULATORY SURGERY CENTER | Age: 49
End: 2019-11-25

## 2019-11-26 ENCOUNTER — SURGERY - HEALTHEAST (OUTPATIENT)
Dept: SURGERY | Facility: AMBULATORY SURGERY CENTER | Age: 49
End: 2019-11-26

## 2019-11-26 ASSESSMENT — MIFFLIN-ST. JEOR: SCORE: 2446.57

## 2019-11-29 ENCOUNTER — OFFICE VISIT - HEALTHEAST (OUTPATIENT)
Dept: FAMILY MEDICINE | Facility: CLINIC | Age: 49
End: 2019-11-29

## 2019-11-29 DIAGNOSIS — L03.319 CELLULITIS AND ABSCESS OF TRUNK: ICD-10-CM

## 2019-11-29 DIAGNOSIS — L02.219 CELLULITIS AND ABSCESS OF TRUNK: ICD-10-CM

## 2019-12-01 LAB — BACTERIA SPEC CULT: NORMAL

## 2019-12-06 ENCOUNTER — OFFICE VISIT - HEALTHEAST (OUTPATIENT)
Dept: FAMILY MEDICINE | Facility: CLINIC | Age: 49
End: 2019-12-06

## 2019-12-06 DIAGNOSIS — E88.810 METABOLIC SYNDROME: ICD-10-CM

## 2019-12-06 DIAGNOSIS — E66.01 CLASS 3 SEVERE OBESITY DUE TO EXCESS CALORIES WITH SERIOUS COMORBIDITY AND BODY MASS INDEX (BMI) OF 40.0 TO 44.9 IN ADULT (H): ICD-10-CM

## 2019-12-06 DIAGNOSIS — I10 BENIGN ESSENTIAL HYPERTENSION: ICD-10-CM

## 2019-12-06 DIAGNOSIS — R06.83 SNORES: ICD-10-CM

## 2019-12-06 DIAGNOSIS — L02.214 CUTANEOUS ABSCESS OF GROIN: ICD-10-CM

## 2019-12-06 DIAGNOSIS — R73.03 PREDIABETES: ICD-10-CM

## 2019-12-06 DIAGNOSIS — R60.9 SWELLING: ICD-10-CM

## 2019-12-06 DIAGNOSIS — E66.813 CLASS 3 SEVERE OBESITY DUE TO EXCESS CALORIES WITH SERIOUS COMORBIDITY AND BODY MASS INDEX (BMI) OF 40.0 TO 44.9 IN ADULT (H): ICD-10-CM

## 2019-12-06 DIAGNOSIS — E55.9 AVITAMINOSIS D: ICD-10-CM

## 2019-12-06 NOTE — ASSESSMENT & PLAN NOTE
49 y.o. year old male in clinic today to discuss treatment of the following conditions through diet and lifestyle modification and weight loss:  1. Class 3 severe obesity due to excess calories with serious comorbidity and body mass index (BMI) of 40.0 to 44.9 in adult (H)    2. Cutaneous abscess of groin    3. Snores    4. Metabolic syndrome      The patient's weight loss result since the last visit was unsuccessful as he is gained weight.  The patient says that he is struggling with self accountability.  He believes that that is appropriate on is helpful at this time but is looking for more support.  Previously, he did well through a structured program with jimena as part of the nutritional plan.  -Enroll in the 24-week program.  He will start this in January.  -Continue diethylpropion.  -Return to clinic in 2-3 months for a visit with myself.  -The patient is interested in surgical bariatrics however, this is excluded from his health plan through his wife's employer.

## 2019-12-06 NOTE — ASSESSMENT & PLAN NOTE
No component of cellulitic infection.  The abscess is still draining.  I recommended warm compresses and continued observation.  He will complete his course of oral antibiotics.  If this worsens early next week, I would invite the patient back to clinic for additional I&D.  This may be a loculated lesion that completely drained.

## 2019-12-13 ENCOUNTER — COMMUNICATION - HEALTHEAST (OUTPATIENT)
Dept: FAMILY MEDICINE | Facility: CLINIC | Age: 49
End: 2019-12-13

## 2019-12-18 ENCOUNTER — COMMUNICATION - HEALTHEAST (OUTPATIENT)
Dept: FAMILY MEDICINE | Facility: CLINIC | Age: 49
End: 2019-12-18

## 2019-12-18 DIAGNOSIS — I10 BENIGN ESSENTIAL HYPERTENSION: ICD-10-CM

## 2020-01-16 ENCOUNTER — OFFICE VISIT - HEALTHEAST (OUTPATIENT)
Dept: PHYSICAL THERAPY | Facility: REHABILITATION | Age: 50
End: 2020-01-16

## 2020-01-16 DIAGNOSIS — M54.41 ACUTE RIGHT-SIDED LOW BACK PAIN WITH RIGHT-SIDED SCIATICA: ICD-10-CM

## 2020-01-16 DIAGNOSIS — M62.81 GENERALIZED MUSCLE WEAKNESS: ICD-10-CM

## 2020-01-16 DIAGNOSIS — M25.69 DECREASED RANGE OF MOTION OF TRUNK AND BACK: ICD-10-CM

## 2020-01-23 ENCOUNTER — OFFICE VISIT - HEALTHEAST (OUTPATIENT)
Dept: FAMILY MEDICINE | Facility: CLINIC | Age: 50
End: 2020-01-23

## 2020-01-23 DIAGNOSIS — R73.03 PREDIABETES: ICD-10-CM

## 2020-01-23 DIAGNOSIS — I10 BENIGN ESSENTIAL HYPERTENSION: ICD-10-CM

## 2020-01-23 DIAGNOSIS — E55.9 AVITAMINOSIS D: ICD-10-CM

## 2020-01-23 DIAGNOSIS — E66.813 CLASS 3 SEVERE OBESITY DUE TO EXCESS CALORIES WITH SERIOUS COMORBIDITY AND BODY MASS INDEX (BMI) OF 45.0 TO 49.9 IN ADULT (H): ICD-10-CM

## 2020-01-23 DIAGNOSIS — E66.01 CLASS 3 SEVERE OBESITY DUE TO EXCESS CALORIES WITH SERIOUS COMORBIDITY AND BODY MASS INDEX (BMI) OF 45.0 TO 49.9 IN ADULT (H): ICD-10-CM

## 2020-01-23 ASSESSMENT — MIFFLIN-ST. JEOR: SCORE: 2483.99

## 2020-01-23 NOTE — ASSESSMENT & PLAN NOTE
49 y.o. year old male in clinic today to discuss treatment of the following conditions through diet and lifestyle modification and weight loss:  1. Class 3 severe obesity due to excess calories with serious comorbidity and body mass index (BMI) of 45.0 to 49.9 in adult (H)    2. Avitaminosis D    3. Benign essential hypertension    4. Prediabetes      The patient's weight loss result since the last visit was mixed based on weight gain in context of injury.  He is excited to start the 24 week program. He is interested in meal replacement   - continue medications   - start meal replacement   - enroll in 24 week program    - taper/DC gabapentin

## 2020-01-30 ENCOUNTER — OFFICE VISIT - HEALTHEAST (OUTPATIENT)
Dept: FAMILY MEDICINE | Facility: CLINIC | Age: 50
End: 2020-01-30

## 2020-01-30 DIAGNOSIS — I10 BENIGN ESSENTIAL HYPERTENSION: ICD-10-CM

## 2020-01-30 DIAGNOSIS — Z71.3 NUTRITIONAL COUNSELING: ICD-10-CM

## 2020-01-30 DIAGNOSIS — E66.813 CLASS 3 SEVERE OBESITY DUE TO EXCESS CALORIES WITH SERIOUS COMORBIDITY AND BODY MASS INDEX (BMI) OF 45.0 TO 49.9 IN ADULT (H): ICD-10-CM

## 2020-01-30 DIAGNOSIS — E66.01 CLASS 3 SEVERE OBESITY DUE TO EXCESS CALORIES WITH SERIOUS COMORBIDITY AND BODY MASS INDEX (BMI) OF 45.0 TO 49.9 IN ADULT (H): ICD-10-CM

## 2020-01-30 ASSESSMENT — MIFFLIN-ST. JEOR: SCORE: 2488.08

## 2020-01-31 ENCOUNTER — OFFICE VISIT - HEALTHEAST (OUTPATIENT)
Dept: PHYSICAL THERAPY | Facility: REHABILITATION | Age: 50
End: 2020-01-31

## 2020-01-31 DIAGNOSIS — M25.69 DECREASED RANGE OF MOTION OF TRUNK AND BACK: ICD-10-CM

## 2020-01-31 DIAGNOSIS — M62.81 GENERALIZED MUSCLE WEAKNESS: ICD-10-CM

## 2020-01-31 DIAGNOSIS — M54.41 ACUTE RIGHT-SIDED LOW BACK PAIN WITH RIGHT-SIDED SCIATICA: ICD-10-CM

## 2020-02-12 ENCOUNTER — COMMUNICATION - HEALTHEAST (OUTPATIENT)
Dept: FAMILY MEDICINE | Facility: CLINIC | Age: 50
End: 2020-02-12

## 2020-02-12 DIAGNOSIS — I10 BENIGN ESSENTIAL HYPERTENSION: ICD-10-CM

## 2020-02-19 ENCOUNTER — OFFICE VISIT - HEALTHEAST (OUTPATIENT)
Dept: FAMILY MEDICINE | Facility: CLINIC | Age: 50
End: 2020-02-19

## 2020-02-20 ENCOUNTER — OFFICE VISIT - HEALTHEAST (OUTPATIENT)
Dept: PHYSICAL THERAPY | Facility: REHABILITATION | Age: 50
End: 2020-02-20

## 2020-02-20 DIAGNOSIS — M62.81 GENERALIZED MUSCLE WEAKNESS: ICD-10-CM

## 2020-02-20 DIAGNOSIS — R20.0 RIGHT LEG NUMBNESS: ICD-10-CM

## 2020-02-20 DIAGNOSIS — M54.41 ACUTE RIGHT-SIDED LOW BACK PAIN WITH RIGHT-SIDED SCIATICA: ICD-10-CM

## 2020-02-20 DIAGNOSIS — M25.69 DECREASED RANGE OF MOTION OF TRUNK AND BACK: ICD-10-CM

## 2020-02-20 DIAGNOSIS — M54.10 RADICULAR LEG PAIN: ICD-10-CM

## 2020-02-27 ENCOUNTER — OFFICE VISIT - HEALTHEAST (OUTPATIENT)
Dept: FAMILY MEDICINE | Facility: CLINIC | Age: 50
End: 2020-02-27

## 2020-02-27 DIAGNOSIS — Z71.3 NUTRITIONAL COUNSELING: ICD-10-CM

## 2020-02-27 DIAGNOSIS — E66.01 CLASS 3 SEVERE OBESITY DUE TO EXCESS CALORIES WITH SERIOUS COMORBIDITY AND BODY MASS INDEX (BMI) OF 45.0 TO 49.9 IN ADULT (H): ICD-10-CM

## 2020-02-27 DIAGNOSIS — I10 BENIGN ESSENTIAL HYPERTENSION: ICD-10-CM

## 2020-02-27 DIAGNOSIS — E66.813 CLASS 3 SEVERE OBESITY DUE TO EXCESS CALORIES WITH SERIOUS COMORBIDITY AND BODY MASS INDEX (BMI) OF 45.0 TO 49.9 IN ADULT (H): ICD-10-CM

## 2020-02-27 ASSESSMENT — MIFFLIN-ST. JEOR: SCORE: 2473.11

## 2020-03-05 ENCOUNTER — COMMUNICATION - HEALTHEAST (OUTPATIENT)
Dept: PHYSICAL THERAPY | Facility: REHABILITATION | Age: 50
End: 2020-03-05

## 2020-03-06 ENCOUNTER — OFFICE VISIT - HEALTHEAST (OUTPATIENT)
Dept: FAMILY MEDICINE | Facility: CLINIC | Age: 50
End: 2020-03-06

## 2020-03-12 ENCOUNTER — OFFICE VISIT - HEALTHEAST (OUTPATIENT)
Dept: PHYSICAL THERAPY | Facility: REHABILITATION | Age: 50
End: 2020-03-12

## 2020-03-12 DIAGNOSIS — M25.69 DECREASED RANGE OF MOTION OF TRUNK AND BACK: ICD-10-CM

## 2020-03-12 DIAGNOSIS — M54.10 RADICULAR LEG PAIN: ICD-10-CM

## 2020-03-12 DIAGNOSIS — M62.81 GENERALIZED MUSCLE WEAKNESS: ICD-10-CM

## 2020-03-12 DIAGNOSIS — R20.0 RIGHT LEG NUMBNESS: ICD-10-CM

## 2020-03-12 DIAGNOSIS — M54.41 ACUTE RIGHT-SIDED LOW BACK PAIN WITH RIGHT-SIDED SCIATICA: ICD-10-CM

## 2020-03-16 ENCOUNTER — COMMUNICATION - HEALTHEAST (OUTPATIENT)
Dept: FAMILY MEDICINE | Facility: CLINIC | Age: 50
End: 2020-03-16

## 2020-03-16 DIAGNOSIS — R73.03 PREDIABETES: ICD-10-CM

## 2020-03-16 DIAGNOSIS — I10 BENIGN ESSENTIAL HYPERTENSION: ICD-10-CM

## 2020-03-16 DIAGNOSIS — R60.9 SWELLING: ICD-10-CM

## 2020-03-16 DIAGNOSIS — E66.813 CLASS 3 SEVERE OBESITY DUE TO EXCESS CALORIES WITH SERIOUS COMORBIDITY AND BODY MASS INDEX (BMI) OF 40.0 TO 44.9 IN ADULT (H): ICD-10-CM

## 2020-03-16 DIAGNOSIS — E88.810 METABOLIC SYNDROME: ICD-10-CM

## 2020-03-16 DIAGNOSIS — E66.01 CLASS 3 SEVERE OBESITY DUE TO EXCESS CALORIES WITH SERIOUS COMORBIDITY AND BODY MASS INDEX (BMI) OF 40.0 TO 44.9 IN ADULT (H): ICD-10-CM

## 2020-03-16 DIAGNOSIS — R06.83 SNORES: ICD-10-CM

## 2020-03-16 DIAGNOSIS — E55.9 AVITAMINOSIS D: ICD-10-CM

## 2020-03-17 ENCOUNTER — COMMUNICATION - HEALTHEAST (OUTPATIENT)
Dept: FAMILY MEDICINE | Facility: CLINIC | Age: 50
End: 2020-03-17

## 2020-03-17 DIAGNOSIS — J01.90 ACUTE NON-RECURRENT SINUSITIS, UNSPECIFIED LOCATION: ICD-10-CM

## 2020-04-03 ENCOUNTER — COMMUNICATION - HEALTHEAST (OUTPATIENT)
Dept: FAMILY MEDICINE | Facility: CLINIC | Age: 50
End: 2020-04-03

## 2020-04-06 ENCOUNTER — COMMUNICATION - HEALTHEAST (OUTPATIENT)
Dept: FAMILY MEDICINE | Facility: CLINIC | Age: 50
End: 2020-04-06

## 2020-04-07 ENCOUNTER — OFFICE VISIT - HEALTHEAST (OUTPATIENT)
Dept: FAMILY MEDICINE | Facility: CLINIC | Age: 50
End: 2020-04-07

## 2020-04-07 DIAGNOSIS — E88.810 METABOLIC SYNDROME: ICD-10-CM

## 2020-04-07 DIAGNOSIS — I10 BENIGN ESSENTIAL HYPERTENSION: ICD-10-CM

## 2020-04-07 DIAGNOSIS — R06.83 SNORES: ICD-10-CM

## 2020-04-07 DIAGNOSIS — E66.813 CLASS 3 SEVERE OBESITY DUE TO EXCESS CALORIES WITH SERIOUS COMORBIDITY AND BODY MASS INDEX (BMI) OF 45.0 TO 49.9 IN ADULT (H): ICD-10-CM

## 2020-04-07 DIAGNOSIS — R73.03 PREDIABETES: ICD-10-CM

## 2020-04-07 DIAGNOSIS — E66.01 CLASS 3 SEVERE OBESITY DUE TO EXCESS CALORIES WITH SERIOUS COMORBIDITY AND BODY MASS INDEX (BMI) OF 45.0 TO 49.9 IN ADULT (H): ICD-10-CM

## 2020-04-07 NOTE — ASSESSMENT & PLAN NOTE
49 y.o. year old male in clinic today to discuss treatment of the following conditions through diet and lifestyle modification and weight loss:  1. Class 3 severe obesity due to excess calories with serious comorbidity and body mass index (BMI) of 45.0 to 49.9 in adult (H)    2. Prediabetes    3. Metabolic syndrome    4. Benign essential hypertension    5. Snores      The patient's weight loss result since the last visit was mixed based on weight stability.  The patient feels as though his current effort is basically plateaued and is somewhat frustrated.  Back pain is improved with a recent burst of steroids.  He is not sure that diethylpropion is particularly helpful at this time.  -We discussed in details the pharmaceutical options.  Ultimately, we will see if his insurance covers liraglutide.  Initially, he lost some weight with this medication but over the past 6-12 months his weight has been steadily increasing despite use of anorexigenic medications.  No personal history of medullary thyroid cancer.  No family history of medullary thyroid cancer.  No personal history of pancreatitis.  -24-week comprehensive weight management program.  - Bariatric surgery is not covered by his insurance over the patient would be interested in continuing this conversation if it were not cost prohibitive.  - Follow-up in 1 month, sooner as needed.

## 2020-04-08 ENCOUNTER — COMMUNICATION - HEALTHEAST (OUTPATIENT)
Dept: FAMILY MEDICINE | Facility: CLINIC | Age: 50
End: 2020-04-08

## 2020-04-10 ENCOUNTER — OFFICE VISIT - HEALTHEAST (OUTPATIENT)
Dept: FAMILY MEDICINE | Facility: CLINIC | Age: 50
End: 2020-04-10

## 2020-04-10 ENCOUNTER — COMMUNICATION - HEALTHEAST (OUTPATIENT)
Dept: FAMILY MEDICINE | Facility: CLINIC | Age: 50
End: 2020-04-10

## 2020-04-10 DIAGNOSIS — E66.9 OBESITY: ICD-10-CM

## 2020-04-14 ENCOUNTER — COMMUNICATION - HEALTHEAST (OUTPATIENT)
Dept: FAMILY MEDICINE | Facility: CLINIC | Age: 50
End: 2020-04-14

## 2020-04-24 ENCOUNTER — COMMUNICATION - HEALTHEAST (OUTPATIENT)
Dept: FAMILY MEDICINE | Facility: CLINIC | Age: 50
End: 2020-04-24

## 2020-05-27 ENCOUNTER — COMMUNICATION - HEALTHEAST (OUTPATIENT)
Dept: FAMILY MEDICINE | Facility: CLINIC | Age: 50
End: 2020-05-27

## 2020-05-29 ENCOUNTER — OFFICE VISIT - HEALTHEAST (OUTPATIENT)
Dept: FAMILY MEDICINE | Facility: CLINIC | Age: 50
End: 2020-05-29

## 2020-05-29 ENCOUNTER — COMMUNICATION - HEALTHEAST (OUTPATIENT)
Dept: FAMILY MEDICINE | Facility: CLINIC | Age: 50
End: 2020-05-29

## 2020-05-29 DIAGNOSIS — E66.01 CLASS 3 SEVERE OBESITY DUE TO EXCESS CALORIES WITH SERIOUS COMORBIDITY AND BODY MASS INDEX (BMI) OF 45.0 TO 49.9 IN ADULT (H): ICD-10-CM

## 2020-05-29 DIAGNOSIS — E88.810 METABOLIC SYNDROME: ICD-10-CM

## 2020-05-29 DIAGNOSIS — R73.03 PREDIABETES: ICD-10-CM

## 2020-05-29 DIAGNOSIS — R06.83 SNORES: ICD-10-CM

## 2020-05-29 DIAGNOSIS — I10 BENIGN ESSENTIAL HYPERTENSION: ICD-10-CM

## 2020-05-29 DIAGNOSIS — E66.813 CLASS 3 SEVERE OBESITY DUE TO EXCESS CALORIES WITH SERIOUS COMORBIDITY AND BODY MASS INDEX (BMI) OF 45.0 TO 49.9 IN ADULT (H): ICD-10-CM

## 2020-05-29 NOTE — ASSESSMENT & PLAN NOTE
49 y.o. year old male in clinic today to discuss treatment of the following conditions through diet and lifestyle modification and weight loss:  1. Class 3 severe obesity due to excess calories with serious comorbidity and body mass index (BMI) of 45.0 to 49.9 in adult (H)    2. Prediabetes    3. Metabolic syndrome    4. Benign essential hypertension    5. Snores      The patient's weight loss result since the last visit was successful based on weight loss.  He has been tolerant of saxenda.   Less night eating.  He states that it is easier to execute his plan.   - continue saxenda   - now off diethylpropion   - continue metformin   - continue to add exercise   - follow-up in 6-8 weeks.

## 2020-06-05 ENCOUNTER — COMMUNICATION - HEALTHEAST (OUTPATIENT)
Dept: FAMILY MEDICINE | Facility: CLINIC | Age: 50
End: 2020-06-05

## 2020-06-05 DIAGNOSIS — R06.83 SNORES: ICD-10-CM

## 2020-06-05 DIAGNOSIS — E66.813 CLASS 3 SEVERE OBESITY DUE TO EXCESS CALORIES WITH SERIOUS COMORBIDITY AND BODY MASS INDEX (BMI) OF 45.0 TO 49.9 IN ADULT (H): ICD-10-CM

## 2020-06-05 DIAGNOSIS — E88.810 METABOLIC SYNDROME: ICD-10-CM

## 2020-06-05 DIAGNOSIS — I10 BENIGN ESSENTIAL HYPERTENSION: ICD-10-CM

## 2020-06-05 DIAGNOSIS — E66.01 CLASS 3 SEVERE OBESITY DUE TO EXCESS CALORIES WITH SERIOUS COMORBIDITY AND BODY MASS INDEX (BMI) OF 45.0 TO 49.9 IN ADULT (H): ICD-10-CM

## 2020-06-05 DIAGNOSIS — R73.03 PREDIABETES: ICD-10-CM

## 2020-06-11 ENCOUNTER — COMMUNICATION - HEALTHEAST (OUTPATIENT)
Dept: FAMILY MEDICINE | Facility: CLINIC | Age: 50
End: 2020-06-11

## 2020-06-15 ENCOUNTER — COMMUNICATION - HEALTHEAST (OUTPATIENT)
Dept: FAMILY MEDICINE | Facility: CLINIC | Age: 50
End: 2020-06-15

## 2020-06-16 ENCOUNTER — COMMUNICATION - HEALTHEAST (OUTPATIENT)
Dept: FAMILY MEDICINE | Facility: CLINIC | Age: 50
End: 2020-06-16

## 2020-06-16 DIAGNOSIS — I10 BENIGN ESSENTIAL HYPERTENSION: ICD-10-CM

## 2020-06-17 ENCOUNTER — AMBULATORY - HEALTHEAST (OUTPATIENT)
Dept: FAMILY MEDICINE | Facility: CLINIC | Age: 50
End: 2020-06-17

## 2020-06-17 ENCOUNTER — VIRTUAL VISIT (OUTPATIENT)
Dept: FAMILY MEDICINE | Facility: OTHER | Age: 50
End: 2020-06-17

## 2020-06-17 DIAGNOSIS — Z20.822 SUSPECTED COVID-19 VIRUS INFECTION: ICD-10-CM

## 2020-06-18 NOTE — PROGRESS NOTES
"Date: 2020 15:22:24  Clinician: Lynda Grover  Clinician NPI: [id number]  Patient: Attila Rocha  Patient : 1970  Patient Address: [address] [place], [place] [address]  Patient Phone: [phone]  Visit Protocol: URI  Patient Summary:  Attila is a 49 year old ( : 1970 ) male who initiated a Visit for COVID-19 (Coronavirus) evaluation and screening. When asked the question \"Please sign me up to receive news, health information and promotions from [employer].\", Attila responded \"No\".    Attila states his symptoms started suddenly 5-6 days ago. After his symptoms started, they improved and then got worse again.   His symptoms consist of nausea, diarrhea, malaise, a headache, and chills.   Symptom details   Headache: He states the headache is mild (1-3 on a 10 point pain scale).    Attila denies having wheezing, teeth pain, ageusia, sore throat, enlarged lymph nodes, myalgias, anosmia, facial pain or pressure, fever, cough, nasal congestion, vomiting, rhinitis, and ear pain. He also denies having recent facial or sinus surgery in the past 60 days, taking antibiotic medication for the symptoms, and having a sinus infection within the past year. He is not experiencing dyspnea.   Precipitating events  He has not recently been exposed to someone with influenza. Attila has not been in close contact with any high risk individuals.   Pertinent COVID-19 (Coronavirus) information  In the past 14 days, Attila has not worked in a congregate living setting.   He does not work or volunteer as healthcare worker or a  and does not work or volunteer in a healthcare facility.   Attila also has not lived in a congregate living setting in the past 14 days. He does not live with a healthcare worker.   Attila has not had a close contact with a laboratory-confirmed COVID-19 patient within 14 days of symptom onset.   Pertinent medical history  Attila does not need a return to work/school note.   Weight: " 334 lbs   Attila does not smoke or use smokeless tobacco.   Additional information as reported by the patient (free text): Last friday night, i got extreme chills, woke up at midnight and threw up all night.  Better saturday. Saturday morning, woke up at 4 am very nauseated. 3 hours of diarrhea follwed. Felt better until tuesday night , very fatigued, woke up wednesday nauseated and had diarrhea again this afternoon. Pretty consistent headache.   Weight: 334 lbs    MEDICATIONS: metformin oral, valsartan-hydrochlorothiazide oral, Saxenda subcutaneous, ALLERGIES: Penicillins  Clinician Response:  Dear Attila,   Your symptoms show that you may have coronavirus (COVID-19). This illness can cause fever, cough and trouble breathing. Many people get a mild case and get better on their own. Some people can get very sick.  What should I do?  We would like to test you for this virus.   1. Please call [phone] to schedule your visit. Explain that you were referred by [facility] to have a COVID-19 test. Be ready to share your [facility] visit ID number.  The following will serve as your written order for this COVID Test, ordered by me, for the indication of suspected COVID [Z20.828]: The test will be ordered in [facility], our electronic health record, after you are scheduled. It will show as ordered and authorized by Juan Luis Liriano MD.  Order: COVID-19 (Coronavirus) PCR for SYMPTOMATIC testing from [facility].      2. When it's time for your COVID test:  Stay at least 6 feet away from others. (If someone will drive you to your test, stay in the backseat, as far away from the  as you can.)   Cover your mouth and nose with a mask, tissue or washcloth.  Go straight to the testing site. Don't make any stops on the way there or back.      3.Starting now: Stay home and away from others (self-isolate) until:   You've had no fever---and no medicine that reduces fever---for 3 full days (72 hours). And...   Your other symptoms have gotten  "better. For example, your cough or breathing has improved. And...   At least 10 days have passed since your symptoms started.       During this time, don't leave the house except for testing or medical care.   Stay in your own room, even for meals. Use your own bathroom if you can.   Stay away from others in your home. No hugging, kissing or shaking hands. No visitors.  Don't go to work, school or anywhere else.    Clean \"high touch\" surfaces often (doorknobs, counters, handles, etc.). Use a household cleaning spray or wipes. You'll find a full list of  on the EPA website: www.epa.gov/pesticide-registration/list-n-disinfectants-use-against-sars-cov-2.   Cover your mouth and nose with a mask, tissue or washcloth to avoid spreading germs.  Wash your hands and face often. Use soap and water.  Caregivers in these groups are at risk for severe illness due to COVID-19:  o People 65 years and older  o People who live in a nursing home or long-term care facility  o People with chronic disease (lung, heart, cancer, diabetes, kidney, liver, immunologic)  o People who have a weakened immune system, including those who:   Are in cancer treatment  Take medicine that weakens the immune system, such as corticosteroids  Had a bone marrow or organ transplant  Have an immune deficiency  Have poorly controlled HIV or AIDS  Are obese (body mass index of 40 or higher)  Smoke regularly   o Caregivers should wear gloves while washing dishes, handling laundry and cleaning bedrooms and bathrooms.  o Use caution when washing and drying laundry: Don't shake dirty laundry, and use the warmest water setting that you can.  o For more tips, go to www.cdc.gov/coronavirus/2019-ncov/downloads/10Things.pdf.      How can I take care of myself?   Get lots of rest. Drink extra fluids (unless a doctor has told you not to).   Take Tylenol (acetaminophen) for fever or pain. If you have liver or kidney problems, ask your family doctor if it's okay " to take Tylenol.   Adults can take either:    650 mg (two 325 mg pills) every 4 to 6 hours, or...   1,000 mg (two 500 mg pills) every 8 hours as needed.    Note: Don't take more than 3,000 mg in one day. Acetaminophen is found in many medicines (both prescribed and over-the-counter medicines). Read all labels to be sure you don't take too much.   For children, check the Tylenol bottle for the right dose. The dose is based on the child's age or weight.    If you have other health problems (like cancer, heart failure, an organ transplant or severe kidney disease): Call your specialty clinic if you don't feel better in the next 2 days.       Know when to call 911. Emergency warning signs include:    Trouble breathing or shortness of breath Pain or pressure in the chest that doesn't go away Feeling confused like you haven't felt before, or not being able to wake up Bluish-colored lips or face.  Where can I get more information?   [facility] -- About COVID-19: [email or web address]   CDC -- What to Do If You're Sick: www.cdc.gov/coronavirus/2019-ncov/about/steps-when-sick.html   CDC -- Ending Home Isolation: www.cdc.gov/coronavirus/2019-ncov/hcp/disposition-in-home-patients.html   CDC -- Caring for Someone: www.cdc.gov/coronavirus/2019-ncov/if-you-are-sick/care-for-someone.html   [facility] -- Interim Guidance for Hospital Discharge to Home: www.health.[facility].[place]/diseases/coronavirus/hcp/hospdischarge.pdf   [facility] clinical trials (COVID-19 research studies): clinicalaffairs.[facility]/umn-clinical-trials    Below are the COVID-19 hotlines at the [place] Department of Health ([facility]). Interpreters are available.    For health questions: Call 553-336-7954 or 1-710.904.4543 (7 a.m. to 7 p.m.) For questions about schools and childcare: Call 492-134-6051 or 1-660.743.4312 (7 a.m. to 7 p.m.)    COVID-19 (Coronavirus) General Information  Because there is currently no vaccine to prevent infection, the  best way to protect yourself is to avoid being exposed to this virus. Common symptoms of COVID-19 include but are not limited to fever, cough, and shortness of breath. These symptoms appear 2-14 days after you are exposed to the virus that causes COVID-19. Click here for more information from the CDC on how to protect yourself.  If you are sick with COVID-19 or suspect you are infected with the virus that causes COVID-19, follow the steps here from the CDC to help prevent the disease from spreading to people in your home and community.  Click here for general information from the CDC on testing.  If you develop any of these emergency warning signs for COVID-19, get medical attention immediately:     Trouble breathing    Persistent pain or pressure in the chest    New confusion or inability to arouse    Bluish lips or face      Call your doctor or clinic before going in. Call 911 if you have a medical emergency and notify the  you have or think you may have COVID-19.  For more detailed and up to date information on COVID-19 (Coronavirus), please visit the CDC website.   Diagnosis: Myalgia  Diagnosis ICD: M79.1  Additional Clinician Notes: Hi.  Potentially the symptoms could be related to COVID so I would like you to get tested.  This also could be the stomach flu.  Please push fluids and easy to digest foods.  Watch for dehydration which could be low urine output dry mouth and dizziness with standing.  Consider in person evaluation if you are having symptoms of concern.

## 2020-06-19 ENCOUNTER — COMMUNICATION - HEALTHEAST (OUTPATIENT)
Dept: FAMILY MEDICINE | Facility: CLINIC | Age: 50
End: 2020-06-19

## 2020-07-01 ENCOUNTER — COMMUNICATION - HEALTHEAST (OUTPATIENT)
Dept: FAMILY MEDICINE | Facility: CLINIC | Age: 50
End: 2020-07-01

## 2020-07-28 ENCOUNTER — OFFICE VISIT - HEALTHEAST (OUTPATIENT)
Dept: FAMILY MEDICINE | Facility: CLINIC | Age: 50
End: 2020-07-28

## 2020-07-28 DIAGNOSIS — E66.01 CLASS 3 SEVERE OBESITY DUE TO EXCESS CALORIES WITH SERIOUS COMORBIDITY AND BODY MASS INDEX (BMI) OF 45.0 TO 49.9 IN ADULT (H): ICD-10-CM

## 2020-07-28 DIAGNOSIS — I10 BENIGN ESSENTIAL HYPERTENSION: ICD-10-CM

## 2020-07-28 DIAGNOSIS — R06.83 SNORES: ICD-10-CM

## 2020-07-28 DIAGNOSIS — E66.813 CLASS 3 SEVERE OBESITY DUE TO EXCESS CALORIES WITH SERIOUS COMORBIDITY AND BODY MASS INDEX (BMI) OF 45.0 TO 49.9 IN ADULT (H): ICD-10-CM

## 2020-07-28 DIAGNOSIS — R73.03 PREDIABETES: ICD-10-CM

## 2020-07-28 NOTE — ASSESSMENT & PLAN NOTE
49 y.o. year old male in clinic today to discuss treatment of the following conditions through diet and lifestyle modification and weight loss:  1. Class 3 severe obesity due to excess calories with serious comorbidity and body mass index (BMI) of 45.0 to 49.9 in adult (H)    2. Prediabetes    3. Benign essential hypertension    4. Snores      The patient's weight loss result since the last visit was successful based on weight loss.  Some mild side effects that went down with dose adjustment. Portions have decreased since starting liraglutide.  Now >10% weight loss.     - continue metformin. Decrease to 500 mg / day   - continue liraglutide.  Current dose 2.4 mg.   - continue to be more active.    - adjust BP meds at next visit?    Follow-up: 2 months

## 2020-09-18 ENCOUNTER — OFFICE VISIT - HEALTHEAST (OUTPATIENT)
Dept: FAMILY MEDICINE | Facility: CLINIC | Age: 50
End: 2020-09-18

## 2020-09-18 DIAGNOSIS — R73.03 PREDIABETES: ICD-10-CM

## 2020-09-18 DIAGNOSIS — H66.001 NON-RECURRENT ACUTE SUPPURATIVE OTITIS MEDIA OF RIGHT EAR WITHOUT SPONTANEOUS RUPTURE OF TYMPANIC MEMBRANE: ICD-10-CM

## 2020-09-18 DIAGNOSIS — E88.810 METABOLIC SYNDROME: ICD-10-CM

## 2020-09-18 DIAGNOSIS — I10 BENIGN ESSENTIAL HYPERTENSION: ICD-10-CM

## 2020-09-18 LAB
ANION GAP SERPL CALCULATED.3IONS-SCNC: 11 MMOL/L (ref 5–18)
BUN SERPL-MCNC: 12 MG/DL (ref 8–22)
CALCIUM SERPL-MCNC: 9 MG/DL (ref 8.5–10.5)
CHLORIDE BLD-SCNC: 102 MMOL/L (ref 98–107)
CO2 SERPL-SCNC: 28 MMOL/L (ref 22–31)
CREAT SERPL-MCNC: 0.8 MG/DL (ref 0.7–1.3)
GFR SERPL CREATININE-BSD FRML MDRD: >60 ML/MIN/1.73M2
GLUCOSE BLD-MCNC: 95 MG/DL (ref 70–125)
POTASSIUM BLD-SCNC: 3.1 MMOL/L (ref 3.5–5)
SODIUM SERPL-SCNC: 141 MMOL/L (ref 136–145)

## 2020-09-18 NOTE — ASSESSMENT & PLAN NOTE
Blood pressure reasonably well controlled.  He continues to work on diet/lifestyle modifications.  No side effects or medications.  Check basic metabolic panel.  Continue current therapy without change.

## 2020-09-19 LAB — HBA1C MFR BLD: 5.5 %

## 2020-09-21 ENCOUNTER — COMMUNICATION - HEALTHEAST (OUTPATIENT)
Dept: FAMILY MEDICINE | Facility: CLINIC | Age: 50
End: 2020-09-21

## 2020-09-21 ENCOUNTER — AMBULATORY - HEALTHEAST (OUTPATIENT)
Dept: FAMILY MEDICINE | Facility: CLINIC | Age: 50
End: 2020-09-21

## 2020-09-21 DIAGNOSIS — H66.90 ACUTE EAR INFECTION, UNSPECIFIED LATERALITY: ICD-10-CM

## 2020-09-22 ENCOUNTER — AMBULATORY - HEALTHEAST (OUTPATIENT)
Dept: FAMILY MEDICINE | Facility: CLINIC | Age: 50
End: 2020-09-22

## 2020-09-22 DIAGNOSIS — E87.6 HYPOKALEMIA: ICD-10-CM

## 2020-09-23 ENCOUNTER — COMMUNICATION - HEALTHEAST (OUTPATIENT)
Dept: FAMILY MEDICINE | Facility: CLINIC | Age: 50
End: 2020-09-23

## 2020-09-24 ENCOUNTER — OFFICE VISIT - HEALTHEAST (OUTPATIENT)
Dept: FAMILY MEDICINE | Facility: CLINIC | Age: 50
End: 2020-09-24

## 2020-09-24 DIAGNOSIS — H60.391 INFECTIVE OTITIS EXTERNA, RIGHT: ICD-10-CM

## 2020-09-24 DIAGNOSIS — E87.6 HYPOKALEMIA: ICD-10-CM

## 2020-09-24 LAB
ANION GAP SERPL CALCULATED.3IONS-SCNC: 11 MMOL/L (ref 5–18)
BUN SERPL-MCNC: 14 MG/DL (ref 8–22)
CALCIUM SERPL-MCNC: 9.5 MG/DL (ref 8.5–10.5)
CHLORIDE BLD-SCNC: 104 MMOL/L (ref 98–107)
CO2 SERPL-SCNC: 27 MMOL/L (ref 22–31)
CREAT SERPL-MCNC: 0.91 MG/DL (ref 0.7–1.3)
GFR SERPL CREATININE-BSD FRML MDRD: >60 ML/MIN/1.73M2
GLUCOSE BLD-MCNC: 101 MG/DL (ref 70–125)
POTASSIUM BLD-SCNC: 3.9 MMOL/L (ref 3.5–5)
SODIUM SERPL-SCNC: 142 MMOL/L (ref 136–145)

## 2020-09-29 ENCOUNTER — OFFICE VISIT - HEALTHEAST (OUTPATIENT)
Dept: FAMILY MEDICINE | Facility: CLINIC | Age: 50
End: 2020-09-29

## 2020-09-29 ENCOUNTER — COMMUNICATION - HEALTHEAST (OUTPATIENT)
Dept: FAMILY MEDICINE | Facility: CLINIC | Age: 50
End: 2020-09-29

## 2020-09-29 DIAGNOSIS — E66.813 CLASS 3 SEVERE OBESITY DUE TO EXCESS CALORIES WITH SERIOUS COMORBIDITY AND BODY MASS INDEX (BMI) OF 40.0 TO 44.9 IN ADULT (H): ICD-10-CM

## 2020-09-29 DIAGNOSIS — E66.01 CLASS 3 SEVERE OBESITY DUE TO EXCESS CALORIES WITH SERIOUS COMORBIDITY AND BODY MASS INDEX (BMI) OF 40.0 TO 44.9 IN ADULT (H): ICD-10-CM

## 2020-09-29 NOTE — ASSESSMENT & PLAN NOTE
50 y.o. year old male in clinic today to discuss treatment of the following conditions through diet and lifestyle modification and weight loss:  No diagnosis found.  The patient's weight loss result since the last visit was successful based on sense of feeling healthy and strong.  Physical activity has increased in quantity and quality.    - continue metformin   - continue liraglutide: increase back to 3.0 mg   - success definition: back not hurting, labs.      - follow-up in three months with annual exam.

## 2020-10-03 ENCOUNTER — AMBULATORY - HEALTHEAST (OUTPATIENT)
Dept: NURSING | Facility: CLINIC | Age: 50
End: 2020-10-03

## 2020-11-24 ENCOUNTER — OFFICE VISIT - HEALTHEAST (OUTPATIENT)
Dept: FAMILY MEDICINE | Facility: CLINIC | Age: 50
End: 2020-11-24

## 2020-11-24 DIAGNOSIS — H60.391 INFECTIVE OTITIS EXTERNA, RIGHT: ICD-10-CM

## 2020-11-24 DIAGNOSIS — R06.83 SNORES: ICD-10-CM

## 2020-11-24 DIAGNOSIS — J01.01 ACUTE RECURRENT MAXILLARY SINUSITIS: ICD-10-CM

## 2020-11-24 NOTE — ASSESSMENT & PLAN NOTE
Recurrent severe symptoms. History of sinus infections.  Given acuity of onset, without concurrent symptoms of viral upper respiratory infection, I did prescribe an antibiotic.  Doxycycline prescribed (penicillin allergy).  I think use of eardrops is reasonable given purulent drainage from the ears.  Referral placed for ENT given recurrence, worsening of symptoms and history of statements of previous anatomic abnormalities.

## 2020-12-02 ENCOUNTER — OFFICE VISIT - HEALTHEAST (OUTPATIENT)
Dept: OTOLARYNGOLOGY | Facility: CLINIC | Age: 50
End: 2020-12-02

## 2020-12-02 DIAGNOSIS — J32.4 CHRONIC PANSINUSITIS: ICD-10-CM

## 2020-12-02 DIAGNOSIS — H62.41 OTITIS EXTERNA, FUNGAL, RIGHT EAR: ICD-10-CM

## 2020-12-02 DIAGNOSIS — B36.9 OTITIS EXTERNA, FUNGAL, RIGHT EAR: ICD-10-CM

## 2020-12-04 ENCOUNTER — COMMUNICATION - HEALTHEAST (OUTPATIENT)
Dept: OTOLARYNGOLOGY | Facility: CLINIC | Age: 50
End: 2020-12-04

## 2020-12-04 ENCOUNTER — AMBULATORY - HEALTHEAST (OUTPATIENT)
Dept: OTOLARYNGOLOGY | Facility: CLINIC | Age: 50
End: 2020-12-04

## 2020-12-04 DIAGNOSIS — J32.9 SINUSITIS: ICD-10-CM

## 2020-12-07 ENCOUNTER — OFFICE VISIT - HEALTHEAST (OUTPATIENT)
Dept: OTOLARYNGOLOGY | Facility: CLINIC | Age: 50
End: 2020-12-07

## 2020-12-07 ENCOUNTER — HOSPITAL ENCOUNTER (OUTPATIENT)
Dept: CT IMAGING | Facility: CLINIC | Age: 50
Discharge: HOME | End: 2020-12-07
Attending: OTOLARYNGOLOGY

## 2020-12-07 DIAGNOSIS — H62.41 OTITIS EXTERNA, FUNGAL, RIGHT EAR: ICD-10-CM

## 2020-12-07 DIAGNOSIS — B36.9 OTITIS EXTERNA, FUNGAL, RIGHT EAR: ICD-10-CM

## 2020-12-07 DIAGNOSIS — J32.9 SINUSITIS: ICD-10-CM

## 2020-12-08 ENCOUNTER — COMMUNICATION - HEALTHEAST (OUTPATIENT)
Dept: FAMILY MEDICINE | Facility: CLINIC | Age: 50
End: 2020-12-08

## 2020-12-08 DIAGNOSIS — E66.813 CLASS 3 SEVERE OBESITY DUE TO EXCESS CALORIES WITH SERIOUS COMORBIDITY AND BODY MASS INDEX (BMI) OF 45.0 TO 49.9 IN ADULT (H): ICD-10-CM

## 2020-12-08 DIAGNOSIS — R73.03 PREDIABETES: ICD-10-CM

## 2020-12-08 DIAGNOSIS — I10 BENIGN ESSENTIAL HYPERTENSION: ICD-10-CM

## 2020-12-08 DIAGNOSIS — E66.01 CLASS 3 SEVERE OBESITY DUE TO EXCESS CALORIES WITH SERIOUS COMORBIDITY AND BODY MASS INDEX (BMI) OF 45.0 TO 49.9 IN ADULT (H): ICD-10-CM

## 2020-12-08 DIAGNOSIS — R06.83 SNORES: ICD-10-CM

## 2020-12-08 DIAGNOSIS — E88.810 METABOLIC SYNDROME: ICD-10-CM

## 2020-12-11 ENCOUNTER — OFFICE VISIT - HEALTHEAST (OUTPATIENT)
Dept: OTOLARYNGOLOGY | Facility: CLINIC | Age: 50
End: 2020-12-11

## 2020-12-11 DIAGNOSIS — H62.41 OTITIS EXTERNA, FUNGAL, RIGHT EAR: ICD-10-CM

## 2020-12-11 DIAGNOSIS — B36.9 OTITIS EXTERNA, FUNGAL, RIGHT EAR: ICD-10-CM

## 2020-12-11 DIAGNOSIS — J32.0 CHRONIC MAXILLARY SINUSITIS: ICD-10-CM

## 2020-12-15 ENCOUNTER — COMMUNICATION - HEALTHEAST (OUTPATIENT)
Dept: FAMILY MEDICINE | Facility: CLINIC | Age: 50
End: 2020-12-15

## 2020-12-15 DIAGNOSIS — I10 BENIGN ESSENTIAL HYPERTENSION: ICD-10-CM

## 2020-12-17 ENCOUNTER — COMMUNICATION - HEALTHEAST (OUTPATIENT)
Dept: OTOLARYNGOLOGY | Facility: CLINIC | Age: 50
End: 2020-12-17

## 2020-12-19 ENCOUNTER — COMMUNICATION - HEALTHEAST (OUTPATIENT)
Dept: FAMILY MEDICINE | Facility: CLINIC | Age: 50
End: 2020-12-19

## 2020-12-19 DIAGNOSIS — I10 BENIGN ESSENTIAL HYPERTENSION: ICD-10-CM

## 2020-12-19 DIAGNOSIS — R73.03 PREDIABETES: ICD-10-CM

## 2020-12-19 DIAGNOSIS — E66.813 CLASS 3 SEVERE OBESITY DUE TO EXCESS CALORIES WITH SERIOUS COMORBIDITY AND BODY MASS INDEX (BMI) OF 45.0 TO 49.9 IN ADULT (H): ICD-10-CM

## 2020-12-19 DIAGNOSIS — R06.83 SNORES: ICD-10-CM

## 2020-12-19 DIAGNOSIS — E88.810 METABOLIC SYNDROME: ICD-10-CM

## 2020-12-19 DIAGNOSIS — E66.01 CLASS 3 SEVERE OBESITY DUE TO EXCESS CALORIES WITH SERIOUS COMORBIDITY AND BODY MASS INDEX (BMI) OF 45.0 TO 49.9 IN ADULT (H): ICD-10-CM

## 2021-01-04 ENCOUNTER — COMMUNICATION - HEALTHEAST (OUTPATIENT)
Dept: OTOLARYNGOLOGY | Facility: CLINIC | Age: 51
End: 2021-01-04

## 2021-01-05 ENCOUNTER — OFFICE VISIT - HEALTHEAST (OUTPATIENT)
Dept: OTOLARYNGOLOGY | Facility: CLINIC | Age: 51
End: 2021-01-05

## 2021-01-05 DIAGNOSIS — B36.9 OTITIS EXTERNA, FUNGAL, RIGHT EAR: ICD-10-CM

## 2021-01-05 DIAGNOSIS — H62.41 OTITIS EXTERNA, FUNGAL, RIGHT EAR: ICD-10-CM

## 2021-01-07 ENCOUNTER — COMMUNICATION - HEALTHEAST (OUTPATIENT)
Dept: OTOLARYNGOLOGY | Facility: CLINIC | Age: 51
End: 2021-01-07

## 2021-01-14 ENCOUNTER — OFFICE VISIT (OUTPATIENT)
Dept: OTOLARYNGOLOGY | Facility: CLINIC | Age: 51
End: 2021-01-14
Payer: COMMERCIAL

## 2021-01-14 VITALS
BODY MASS INDEX: 41.75 KG/M2 | SYSTOLIC BLOOD PRESSURE: 137 MMHG | HEIGHT: 73 IN | HEART RATE: 86 BPM | TEMPERATURE: 98 F | DIASTOLIC BLOOD PRESSURE: 77 MMHG | WEIGHT: 315 LBS

## 2021-01-14 DIAGNOSIS — H60.391 INFECTIVE OTITIS EXTERNA, RIGHT: Primary | ICD-10-CM

## 2021-01-14 PROCEDURE — 99213 OFFICE O/P EST LOW 20 MIN: CPT | Performed by: OTOLARYNGOLOGY

## 2021-01-14 PROCEDURE — 92504 EAR MICROSCOPY EXAMINATION: CPT | Performed by: OTOLARYNGOLOGY

## 2021-01-14 RX ORDER — VALSARTAN AND HYDROCHLOROTHIAZIDE 160; 25 MG/1; MG/1
1 TABLET ORAL DAILY
COMMUNITY
Start: 2020-12-16 | End: 2021-09-12

## 2021-01-14 RX ORDER — PEN NEEDLE, DIABETIC 32GX 5/32"
NEEDLE, DISPOSABLE MISCELLANEOUS
COMMUNITY
Start: 2020-12-22 | End: 2021-08-09

## 2021-01-14 RX ORDER — LIRAGLUTIDE 6 MG/ML
INJECTION, SOLUTION SUBCUTANEOUS
COMMUNITY
Start: 2020-12-09 | End: 2021-08-09

## 2021-01-14 RX ORDER — METFORMIN HCL 500 MG
1000 TABLET, EXTENDED RELEASE 24 HR ORAL DAILY
COMMUNITY
Start: 2020-07-28 | End: 2022-04-16

## 2021-01-14 RX ORDER — HYDROCHLOROTHIAZIDE 12.5 MG/1
CAPSULE ORAL
COMMUNITY
Start: 2020-03-17 | End: 2022-01-17

## 2021-01-14 ASSESSMENT — MIFFLIN-ST. JEOR: SCORE: 2429.34

## 2021-01-14 NOTE — PROGRESS NOTES
"CHIEF COMPLAINT:   Chief Complaint   Patient presents with     Ent Problem     needs ears cleaned -          HISTORY OF PRESENT ILLNESS    Attila was seen for Ear Cleaning.  He is normally seen by me at the [place] location/   H e is als  here for instructions on use of the CSF otic powder.  He has had some increased fullness in the right ear.  He has no other new complaints in terms of ear nose or throat today.  Chief Complaint   Patient presents with     Ent Problem     needs ears cleaned -             REVIEW OF SYSTEMS    Review of Systems as per HPI and PMHx, otherwise 10 system review system are negative.     Penicillins     /77 (BP Location: Right arm, Patient Position: Sitting, Cuff Size: Adult Large)   Pulse 86   Temp 98  F (36.7  C) (Tympanic)   Ht 1.854 m (6' 1\")   Wt (!) 152 kg (335 lb 3.2 oz)   BMI 44.22 kg/m      HEAD: Normal appearance and symmetry:  No cutaneous lesions.      NECK:  supple     EARS: normal TM, EACs     Examination of the right ear none the binocular microscope shows thick grayish-white debris in the medial aspect of the canal.  This was removed with a #5 suction.    Examination the left ear shows an intact tympanic membrane with clean dry external auditory canal.    NOSE:     Dorsum:   straight        ORAL CAVITY/OROPHARYNX:     Lips:  Normal.    NECK:  Trachea:  midline.                NEURO:   Alert and Oriented     GAIT AND STATION:  normal     RESPIRATORY:   Symmetry and Respiratory effort     PSYCH:  Normal mood and affect     SKIN:   warm and dry         IMPRESSION:    Encounter Diagnosis   Name Primary?     Infective otitis externa, right Yes          RECOMMENDATIONS:  Impression is right-sided fungal otitis externa.  Patient is instructed on use of CSF otic powder and 1 puff is instilled into the ear canal today.  He will continue to use the powder for 3 weeks.  He has already has a follow-up follow-up in 4 weeks for recheck.  Advised him to keep the ears dry.  " All questions were answered.  He is agreed with plan of care.    Orders Placed This Encounter   Procedures     Microscopy, Binocular

## 2021-01-14 NOTE — LETTER
"    1/14/2021         RE: Attila Rocha  [address] [facility] [address]        Dear Colleague,    Thank you for referring your patient, Attila Rocha, to the [facility]. Please see a copy of my visit note below.    CHIEF COMPLAINT:   Chief Complaint   Patient presents with     Ent Problem     needs ears cleaned -          HISTORY OF PRESENT ILLNESS    Attila was seen for Ear Cleaning.  He is normally seen by me at the [place] location/   H e is als  here for instructions on use of the CSF otic powder.  He has had some increased fullness in the right ear.  He has no other new complaints in terms of ear nose or throat today.  Chief Complaint   Patient presents with     Ent Problem     needs ears cleaned -             REVIEW OF SYSTEMS    Review of Systems as per HPI and PMHx, otherwise 10 system review system are negative.     Penicillins     /77 (BP Location: Right arm, Patient Position: Sitting, Cuff Size: Adult Large)   Pulse 86   Temp 98  F (36.7  C) (Tympanic)   Ht 1.854 m (6' 1\")   Wt (!) 152 kg (335 lb 3.2 oz)   BMI 44.22 kg/m      HEAD: Normal appearance and symmetry:  No cutaneous lesions.      NECK:  supple     EARS: normal TM, EACs     Examination of the right ear none the binocular microscope shows thick grayish-white debris in the medial aspect of the canal.  This was removed with a #5 suction.    Examination the left ear shows an intact tympanic membrane with clean dry external auditory canal.    NOSE:     Dorsum:   straight        ORAL CAVITY/OROPHARYNX:     Lips:  Normal.    NECK:  Trachea:  midline.                NEURO:   Alert and Oriented     GAIT AND STATION:  normal     RESPIRATORY:   Symmetry and Respiratory effort     PSYCH:  Normal mood and affect     SKIN:   warm and dry         IMPRESSION:    Encounter Diagnosis   Name Primary?     Infective otitis externa, right Yes          RECOMMENDATIONS:  Impression is right-sided fungal otitis externa.  " Patient is instructed on use of CSF otic powder and 1 puff is instilled into the ear canal today.  He will continue to use the powder for 3 weeks.  He has already has a follow-up follow-up in 4 weeks for recheck.  Advised him to keep the ears dry.  All questions were answered.  He is agreed with plan of care.    Orders Placed This Encounter   Procedures     Microscopy, Binocular            Again, thank you for allowing me to participate in the care of your patient.        Sincerely,        Gurpreet Garcia MD

## 2021-01-14 NOTE — NURSING NOTE
"Initial /77 (BP Location: Right arm, Patient Position: Sitting, Cuff Size: Adult Large)   Pulse 86   Temp 98  F (36.7  C) (Tympanic)   Ht 1.854 m (6' 1\")   Wt (!) 152 kg (335 lb 3.2 oz)   BMI 44.22 kg/m   Estimated body mass index is 44.22 kg/m  as calculated from the following:    Height as of this encounter: 1.854 m (6' 1\").    Weight as of this encounter: 152 kg (335 lb 3.2 oz). .    Kassie Caraballo MA    "

## 2021-01-15 ENCOUNTER — COMMUNICATION - HEALTHEAST (OUTPATIENT)
Dept: FAMILY MEDICINE | Facility: CLINIC | Age: 51
End: 2021-01-15

## 2021-01-16 ENCOUNTER — COMMUNICATION - HEALTHEAST (OUTPATIENT)
Dept: OTOLARYNGOLOGY | Facility: CLINIC | Age: 51
End: 2021-01-16

## 2021-01-21 ENCOUNTER — COMMUNICATION - HEALTHEAST (OUTPATIENT)
Dept: FAMILY MEDICINE | Facility: CLINIC | Age: 51
End: 2021-01-21

## 2021-01-22 ENCOUNTER — COMMUNICATION - HEALTHEAST (OUTPATIENT)
Dept: FAMILY MEDICINE | Facility: CLINIC | Age: 51
End: 2021-01-22

## 2021-01-27 ENCOUNTER — AMBULATORY - HEALTHEAST (OUTPATIENT)
Dept: NURSING | Facility: CLINIC | Age: 51
End: 2021-01-27

## 2021-01-27 ENCOUNTER — COMMUNICATION - HEALTHEAST (OUTPATIENT)
Dept: NURSING | Facility: CLINIC | Age: 51
End: 2021-01-27

## 2021-01-27 DIAGNOSIS — U07.1 2019 NOVEL CORONAVIRUS DISEASE (COVID-19): ICD-10-CM

## 2021-01-28 ENCOUNTER — COMMUNICATION - HEALTHEAST (OUTPATIENT)
Dept: FAMILY MEDICINE | Facility: CLINIC | Age: 51
End: 2021-01-28

## 2021-01-28 DIAGNOSIS — I10 BENIGN ESSENTIAL HYPERTENSION: ICD-10-CM

## 2021-01-29 ENCOUNTER — OFFICE VISIT - HEALTHEAST (OUTPATIENT)
Dept: FAMILY MEDICINE | Facility: CLINIC | Age: 51
End: 2021-01-29

## 2021-01-29 DIAGNOSIS — Z09 HOSPITAL DISCHARGE FOLLOW-UP: ICD-10-CM

## 2021-01-29 DIAGNOSIS — U07.1 COVID-19: ICD-10-CM

## 2021-01-29 NOTE — ASSESSMENT & PLAN NOTE
Hospital follow-up.  Patient was hospitalized with symptoms of COVID-19.  His risk factors including morbid obesity.  He describes interval improvement.  His oxygenation is now 95% on room air.  He is taking the discharge medications as prescribed.  He tends to feel winded but feels as though he is on the mend.  His wife is currently struggling symptoms of COVID-19 as well.  No additional recommendations made today.  He will resume his life and normal activities as tolerated.

## 2021-02-05 ENCOUNTER — OFFICE VISIT - HEALTHEAST (OUTPATIENT)
Dept: OTOLARYNGOLOGY | Facility: CLINIC | Age: 51
End: 2021-02-05

## 2021-02-05 ENCOUNTER — OFFICE VISIT - HEALTHEAST (OUTPATIENT)
Dept: AUDIOLOGY | Facility: CLINIC | Age: 51
End: 2021-02-05

## 2021-02-05 DIAGNOSIS — H62.41 OTITIS EXTERNA, FUNGAL, RIGHT EAR: ICD-10-CM

## 2021-02-05 DIAGNOSIS — Z01.10 NORMAL HEARING NOTED ON EXAMINATION: ICD-10-CM

## 2021-02-05 DIAGNOSIS — B36.9 OTITIS EXTERNA, FUNGAL, RIGHT EAR: ICD-10-CM

## 2021-02-10 ENCOUNTER — COMMUNICATION - HEALTHEAST (OUTPATIENT)
Dept: FAMILY MEDICINE | Facility: CLINIC | Age: 51
End: 2021-02-10

## 2021-02-13 ENCOUNTER — COMMUNICATION - HEALTHEAST (OUTPATIENT)
Dept: FAMILY MEDICINE | Facility: CLINIC | Age: 51
End: 2021-02-13

## 2021-04-12 ENCOUNTER — COMMUNICATION - HEALTHEAST (OUTPATIENT)
Dept: OTOLARYNGOLOGY | Facility: CLINIC | Age: 51
End: 2021-04-12

## 2021-04-28 ENCOUNTER — RECORDS - HEALTHEAST (OUTPATIENT)
Dept: ADMINISTRATIVE | Facility: OTHER | Age: 51
End: 2021-04-28

## 2021-04-29 ENCOUNTER — RECORDS - HEALTHEAST (OUTPATIENT)
Dept: FAMILY MEDICINE | Facility: CLINIC | Age: 51
End: 2021-04-29

## 2021-05-05 ENCOUNTER — RECORDS - HEALTHEAST (OUTPATIENT)
Dept: ADMINISTRATIVE | Facility: OTHER | Age: 51
End: 2021-05-05

## 2021-05-28 NOTE — TELEPHONE ENCOUNTER
[facility] PA team  [phone]  Pool: HE PA MED ([id number])          PA has been initiated.       PA form completed and faxed insurance via Cover My Meds     Key:  [id number]     Medication: Diethylpropion HCl ER 75MG er tablets    Insurance:  [employer]         Response will be received via fax and may take up to 5-10 business days depending on plan

## 2021-05-28 NOTE — TELEPHONE ENCOUNTER
Fax received from [facility], they have started the Prior Authorization Process via Cover My Meds    CoverMyMeds Key: [id number]    Medication Name: Diethylpropion ER 75mg tablets    Insurance Plan: [employer]  PBM:   Patient ID: not provided on fax    Please complete the PA process

## 2021-05-28 NOTE — PROGRESS NOTES
Assessment and Plan:     Class 3 severe obesity due to excess calories with serious comorbidity and body mass index (BMI) of 40.0 to 44.9 in adult (H)  48 y.o. year old male in clinic today to discuss treatment of the following conditions through diet and lifestyle modification and weight loss:  1. Class 3 severe obesity due to excess calories with serious comorbidity and body mass index (BMI) of 40.0 to 44.9 in adult (H)    2. Benign essential hypertension    3. Avitaminosis D    4. Metabolic syndrome    5. Prediabetes    6. Snores    7. Swelling      The patient's weight loss result since the last visit was successful based on weight stability.  Some concerns for mild depression.  He continues to struggle with late in the evening cravings and eating.  - Add sertraline at a low dose.  This is for mild irritability as well as night eating syndrome.  -Discussed sleep.  The patient was a goal to be in bed at least 8 hours per night.  -Continue diethylpropion.  This may account for some of his emotional lability.  Consider discontinuing this medication in the future visit if the patient is struggling with (and not losing weight) and consider trial lorcaserin or liraglutide.  -Return to clinic in 1 month.    Continue supplements.    Recommend increasing movement throughout the day--sitting less, moving more.  Will increase activity over time to reach a goal of at least 150 minutes of moderate exercise each week.  Behavior modification:  Cognitive restructuring exercises, journaling stressors, triggers for food cravings.  Dietary Intervention:  Reduced calorie, reduced carbohydrates, whole food diet.  Greater than 50% of this 15 minute visit was spent in counseling regarding patient's obesity, medications, dietary, exercise, and behavior modification recommendations.    Subjective  Patient presents for treatment of chronic, comorbid conditions using intensive therapeutic lifestyle interventions including nutrition,  "physical activity, and behavior management.   - successes: more active.  EAting okay.  \"Stayed the same weight.\"  Thinking about more aggressive medication plan.  Discussed mental health with wife.  Family history of bipolar. The patient believes that he has depressive symptoms from time to time.  Irritability and anxiety.  Distant history of being on prozac (anxiety diagosis). He did not like the \"flatness\" of the medication.     - struggles: does not always tolerate CPAP.     - exercise plan: more active with nice weather.  10 mile bike ride.     - tracking/journaling: yes   - following nutritional plan: mostly.     - hunger: cravings?   - Barriers to losing weight:  Behavior:  inadequate exercise    Patient Active Problem List   Diagnosis     Prediabetes     Class 3 severe obesity due to excess calories with serious comorbidity and body mass index (BMI) of 40.0 to 44.9 in adult (H)     Benign essential hypertension     Snores     Metabolic syndrome     Avitaminosis D     Swelling       Current Outpatient Medications on File Prior to Visit   Medication Sig Dispense Refill     ergocalciferol (ERGOCALCIFEROL) 50,000 unit capsule Take 1 capsule (50,000 Units total) by mouth once a week for 12 doses. 12 capsule 0     hydroCHLOROthiazide (MICROZIDE) 12.5 mg capsule 1 po qd 90 capsule 1     metFORMIN (GLUCOPHAGE XR) 500 MG 24 hr tablet Take 2 tablets (1,000 mg total) by mouth daily with supper. 180 tablet 1     valsartan-hydrochlorothiazide (DIOVAN-HCT) 160-25 mg per tablet Take 1 tablet every day by oral route. 90 tablet 1     [DISCONTINUED] diethylpropion 75 mg TbER Take 75 mg by mouth daily. 30 each 0     [DISCONTINUED] azithromycin (ZITHROMAX Z-SOHAN) 250 MG tablet Take 2 tablets (500 mg) on  Day 1,  followed by 1 tablet (250 mg) once daily on Days 2 through 5. 6 tablet 0     No current facility-administered medications on file prior to visit.        Objective:  Vitals:    04/22/19 0904   BP: 126/76   Pulse: 88 "     Initial Weight: 360 lbs  Weight: (!) 332 lb (150.6 kg)  Weight loss from initial: 28  % Weight loss: 7.78 %    Body mass index is 43.8 kg/m .  No LMP for male patient.  General:  Patient is alert, pleasant, no distress.  Patient is obese.    This note has been dictated using voice recognition software. Any grammatical or context distortions are unintentional and inherent to the software

## 2021-05-29 NOTE — TELEPHONE ENCOUNTER
RN cannot approve Refill Request    RN can NOT refill this medication med is not covered by policy/route to provider. Last office visit: 4/22/2019 Johnson Jeffrey MD Last Physical: Visit date not found Last MTM visit: Visit date not found Last visit same specialty: 4/22/2019 Johnson Jeffrey MD.  Next visit within 3 mo: Visit date not found  Next physical within 3 mo: Visit date not found      Alecia Barrett, [facility] Triage/Med Refill 5/20/2019    Requested Prescriptions   Pending Prescriptions Disp Refills     VITAMIN D2 50,000 unit capsule [Pharmacy Med Name: Vitamin D (Ergocalciferol) Oral Capsule 24221 UNIT] 12 capsule 0     Sig: Take 1 capsule (50,000 Units total) by mouth once a week for 12 doses.       There is no refill protocol information for this order

## 2021-05-29 NOTE — PROGRESS NOTES
Medical  Weight Loss Initial Diet Evaluation  Attila is presenting today for a new weight management nutrition consultation. Pt has had an initial appointment with Dr. Wang .  Would like to have more structure in his diet, tends to be a stress eater.    Weight loss medication: Metformin, Diethylpropion, Zoloft   Nutrition Assessment:   Anthropometrics:  Pt's Initial Weight: 360 lbs  Weight: (!) 341 lb 8 oz (154.9 kg)  Weight loss from initial: 18.5  % Weight loss: 5.14 %    BMI:   Vitals:    06/24/19 1300   Weight: (!) 341 lb 8 oz (154.9 kg)      IBW: 184-194 lbs   Estimated RMR (Young-St Valderrama equation): 2471 kcals/day    Recommended Protein Intake: 60-80 grams/day  Medical History:  Patient Active Problem List   Diagnosis     Prediabetes     Class 3 severe obesity due to excess calories with serious comorbidity and body mass index (BMI) of 40.0 to 44.9 in adult (H)     Benign essential hypertension     Snores     Metabolic syndrome     Avitaminosis D     Swelling     Nutrition History:   Food allergies: No   Weight loss history: Has seen Dietitians in the past   Vitamins/Mineral Supplementation: Vitamin D   Dietary Recall:  Wake up time: 6 am   Breakfast: 7 am; Eggs, Cottage Cheese  Snack:none or piece of fruit (apple, orange) or string cheese   Lunch:leftovers   Snack: none   Dinner:chicken (grilled) or hamburger or pork tenderloin, potatoes, and vegetables or tacos   Snack: cereal, nuts, vegetables, watermelon   Eating out (frequency/week): 1-2 times/weel   Hydration (type/oz. per day):  Water: 48-64 oz/day   Caffeine:Diet Mountain Dew 2-3 times/week   Carbonation: Root Beer 0-1 times/week   Juice: none   Alcohol : none   Exercise:  Routine exercise established: Yes  Walking-leg and foot issues, Biking    Nutrition Diagnosis (PES statement):   (NB-1.7) Undesirable food choices related to lack of motivation and/or readiness to apply change as evidenced by Intake of high caloric density foods/beverages  (juice, soda, alcohol) at meals and/or snacks and BMI of 45.06 kg/m2.      Nutrition Intervention:  1. Discussed with patient how to build a meal: the importance of including a lean/low fat protein at each meal, include a source of vegetables at a minimum of lunch and dinner, and limiting carbohydrate intake to 1 serving (15 grams) per meal.  2. Educated on sources of lean protein, portion sizes, and the amount of grams found in each source. Recommend pt to aim for 20-30 grams of protein at each meal; 60-80 grams per day.  3. Discussed importance of adequate hydration, with emphasis on drinking 64 oz of water or zero calorie containing beverages per day.   Goals established by patient:   1. Eliminate pop completely.   2. Set meal times (space out meals 4-6 hours apart for consistency and prevention of overeating)  Handouts provided:  Plate Method  List of Lean Protein Sources    Nutrition Monitoring/Evaluation:   Follow up:  Pt will follow up in 2 month(s) with dietitian.   Time spent with patient: 30 minutes  Tona Samuel RD   Documented and Visited By: Millie Kay MS, RD LD  ABN: Yes

## 2021-05-29 NOTE — PROGRESS NOTES
"Assessment and Plan:     Class 3 severe obesity due to excess calories with serious comorbidity and body mass index (BMI) of 40.0 to 44.9 in adult (H)  48 y.o. year old male in clinic today to discuss treatment of the following conditions through diet and lifestyle modification and weight loss:  1. Class 3 severe obesity due to excess calories with serious comorbidity and body mass index (BMI) of 40.0 to 44.9 in adult (H)    2. Benign essential hypertension    3. Avitaminosis D    4. Metabolic syndrome    5. Prediabetes    6. Snores    7. Swelling      The patient's weight loss result since the last visit was unsuccessful based on weight stability and deviations from plan.  Social calories and going out are a barrier.  Slowly he has changed portions sizes.   - he is still motivated for additional change.     - reduce stress and anxiety eating.   - take diethylpropion later in the day    Follow-up: 1 months        Continue supplements.    Recommend increasing movement throughout the day--sitting less, moving more.  Will increase activity over time to reach a goal of at least 150 minutes of moderate exercise each week.  Behavior modification:  Cognitive restructuring exercises, journaling stressors, triggers for food cravings.  Dietary Intervention:  Reduced calorie, reduced carbohydrates, whole food diet.  Greater than 50% of this 15 minute visit was spent in counseling regarding patient's obesity, medications, dietary, exercise, and behavior modification recommendations.    Subjective  Patient presents for treatment of chronic, comorbid conditions using intensive therapeutic lifestyle interventions including nutrition, physical activity, and behavior management.   - successes: he has been on zoloft.  \"I think that it is good.\"  Less anxious.  He thinks it might have helped a bit at night.  \"Put the scale away.\"  Portion control has been better.    - struggles: son graduated from .  Birthdays.     - medication side " effects: none   - Barriers to losing weight:  Behavior:  social calories    - leg is better.  Biking more.      Patient Active Problem List   Diagnosis     Prediabetes     Class 3 severe obesity due to excess calories with serious comorbidity and body mass index (BMI) of 40.0 to 44.9 in adult (H)     Benign essential hypertension     Snores     Metabolic syndrome     Avitaminosis D     Swelling       Current Outpatient Medications on File Prior to Visit   Medication Sig Dispense Refill     hydroCHLOROthiazide (MICROZIDE) 12.5 mg capsule 1 po qd 90 capsule 1     metFORMIN (GLUCOPHAGE XR) 500 MG 24 hr tablet Take 2 tablets (1,000 mg total) by mouth daily with supper. 180 tablet 1     sertraline (ZOLOFT) 50 MG tablet Take 1 tablet (50 mg total) by mouth daily. 90 tablet 1     [DISCONTINUED] diethylpropion 75 mg TbER Take 75 mg by mouth daily. 30 each 0     [DISCONTINUED] valsartan-hydrochlorothiazide (DIOVAN-HCT) 160-25 mg per tablet Take 1 tablet every day by oral route. 90 tablet 1     No current facility-administered medications on file prior to visit.        Objective:  Vitals:    06/06/19 0947   BP: 124/76   Pulse: 76     Initial Weight: 360 lbs  Weight: (!) 332 lb (150.6 kg)  Weight loss from initial: 28  % Weight loss: 7.78 %    Body mass index is 43.8 kg/m .  No LMP for male patient.  General:  Patient is alert, pleasant, no distress.  Patient is obese.    This note has been dictated using voice recognition software. Any grammatical or context distortions are unintentional and inherent to the software

## 2021-05-30 NOTE — PROGRESS NOTES
"Assessment and Plan:     Class 3 severe obesity due to excess calories with serious comorbidity and body mass index (BMI) of 40.0 to 44.9 in adult (H)  48 y.o. year old male in clinic today to discuss treatment of the following conditions through diet and lifestyle modification and weight loss:  1. Class 3 severe obesity due to excess calories with serious comorbidity and body mass index (BMI) of 40.0 to 44.9 in adult (H)    2. Benign essential hypertension    3. Avitaminosis D    4. Metabolic syndrome    5. Prediabetes    6. Snores    7. Swelling      The patient's weight loss result since the last visit was successful based on weight loss.  He is not convinced that zoloft has helped.   - stop zoloft   - continue metformin and diethylpropion   - increase activity    Continue supplements.    Recommend increasing movement throughout the day--sitting less, moving more.  Will increase activity over time to reach a goal of at least 150 minutes of moderate exercise each week.  Behavior modification:  Cognitive restructuring exercises, journaling stressors, triggers for food cravings.  Dietary Intervention:  Reduced calorie, reduced carbohydrates, whole food diet.  Greater than 50% of this 25 minute visit was spent in counseling regarding patient's obesity, medications, dietary, exercise, and behavior modification recommendations.    Subjective  Patient presents for treatment of chronic, comorbid conditions using intensive therapeutic lifestyle interventions including nutrition, physical activity, and behavior management.   - successes: \"functioning.\"  Trying to eat the right thing.   - struggles: zoloft: flat.  \"not working.\"  Previously on prozac with similar side effects.  Less irritable.  Tired, lethargic.     - exercise plan: more active    - medication side effects: \"flatness\"   - Barriers to losing weight:  Behavior:  inadequate exercise    Patient Active Problem List   Diagnosis     Prediabetes     Class 3 severe " obesity due to excess calories with serious comorbidity and body mass index (BMI) of 40.0 to 44.9 in adult (H)     Benign essential hypertension     Snores     Metabolic syndrome     Avitaminosis D     Swelling       Current Outpatient Medications on File Prior to Visit   Medication Sig Dispense Refill     hydroCHLOROthiazide (MICROZIDE) 12.5 mg capsule 1 po qd 90 capsule 1     metFORMIN (GLUCOPHAGE XR) 500 MG 24 hr tablet Take 2 tablets (1,000 mg total) by mouth daily with supper. 180 tablet 1     valsartan-hydrochlorothiazide (DIOVAN-HCT) 160-25 mg per tablet Take 1 tablet every day by oral route. 90 tablet 1     [DISCONTINUED] diethylpropion 75 mg TbER Take 75 mg by mouth daily. 30 each 0     [DISCONTINUED] sertraline (ZOLOFT) 50 MG tablet Take 1 tablet (50 mg total) by mouth daily. 90 tablet 1     No current facility-administered medications on file prior to visit.        Objective:  Vitals:    07/25/19 1407   BP: 128/88   Pulse:      Initial Weight: 360 lbs  Weight: (!) 334 lb (151.5 kg)  Weight loss from initial: 26  % Weight loss: 7.22 %    Body mass index is 44.07 kg/m .  No LMP for male patient.  General:  Patient is alert, pleasant, no distress.  Patient is obese.    This note has been dictated using voice recognition software. Any grammatical or context distortions are unintentional and inherent to the software

## 2021-06-02 VITALS — BODY MASS INDEX: 41.75 KG/M2 | HEIGHT: 73 IN | WEIGHT: 315 LBS

## 2021-06-02 VITALS — WEIGHT: 315 LBS | BODY MASS INDEX: 44.99 KG/M2

## 2021-06-02 VITALS — WEIGHT: 315 LBS | BODY MASS INDEX: 43.8 KG/M2

## 2021-06-02 VITALS — BODY MASS INDEX: 43.41 KG/M2 | WEIGHT: 315 LBS

## 2021-06-02 VITALS — WEIGHT: 315 LBS | BODY MASS INDEX: 41.75 KG/M2 | HEIGHT: 73 IN

## 2021-06-02 VITALS — BODY MASS INDEX: 44.94 KG/M2 | WEIGHT: 315 LBS

## 2021-06-02 VITALS — BODY MASS INDEX: 43.54 KG/M2 | WEIGHT: 315 LBS

## 2021-06-02 NOTE — PROGRESS NOTES
ASSESSMENT AND PLAN:     Problem List Items Addressed This Visit        Unprioritized    Acute non-recurrent sinusitis, unspecified location - Primary     ACUTE INFECTION, PERSISTENT X 10 DAYS  He tried conservative measures but they have not helped.  Incidentally he is on a Medrol Dosepak now for possible ruptured plantar fascia through his podiatrist.  This has helped his sinus symptoms somewhat but not enough.  I did review the chart and his last course of Sherrie owusu was in March 2019 and was a Z-Edgar.  Prior to that he had also had a Z-Edgar in 2018.  He tells me today that he used to have very frequent sinus infections and almost had sinus surgery when he used to live in [place].  However since he is moved to [place] his sinus infections are not as frequent.  He says over the last 12 months he is probably had 2 or 3.  He feels 2 or 3 sinus infections a year is manageable.         Relevant Medications    cefdinir (OMNICEF) 300 MG capsule           Chief Complaint   Patient presents with     Sinus Problem     Went into urgent care on Monday - wasn't given anything.  Has had symptoms for about 10 days now, and is not feeling any better.      Cough     Sore Throat        HPI  Attila Rocha is a 49 y.o. male has hx of prediabetes, morbid obesity, hypertension and has been sick for 10 days with sinus pressue, pain. He says he was seen in walk in care, and they recommended conservative measure, he feels he is not improving.     Currently on medrol dose pack for foot problem that may represent rupture plantar fascia - and this is not helpful for his sinus symptoms    He reports feeling feverish, he has green and brown nasal discharge.  He is been coughing up the same.  He also has pressure in both frontal sinuses and both maxillary sinuses.  He says his energy level is really low.  He is a  and he has a wedding this weekend and he needs to be functional for that.    Social History     Tobacco Use   Smoking  Status Never Smoker   Smokeless Tobacco Never Used      Current Outpatient Medications on File Prior to Visit   Medication Sig Dispense Refill     cholecalciferol, vitamin D3, (VITAMIN D3 ORAL) Take by mouth.       diethylpropion 75 mg TbER Take 75 mg by mouth daily. 30 each 0     hydroCHLOROthiazide (MICROZIDE) 12.5 mg capsule 1 po qd 90 capsule 1     metFORMIN (GLUCOPHAGE XR) 500 MG 24 hr tablet Take 2 tablets (1,000 mg total) by mouth daily with supper. 180 tablet 1     methylPREDNISolone (MEDROL DOSEPACK) 4 mg tablet Follow package directions       valsartan-hydrochlorothiazide (DIOVAN-HCT) 160-25 mg per tablet Take 1 tablet every day by oral route. 90 tablet 1     No current facility-administered medications on file prior to visit.       Allergies   Allergen Reactions     Penicillins Other (See Comments)     Rash          Review of Systems   Constitutional: Negative.    HENT: Negative.    Eyes: Negative.    Respiratory: Negative.    Cardiovascular: Negative.    Gastrointestinal: Negative.    Endocrine: Negative.    Genitourinary: Negative.    Musculoskeletal: Negative.    Skin: Negative.    Neurological: Negative.    Hematological: Negative.    Psychiatric/Behavioral: Negative.         OBJECTIVE: /76   Pulse 84   Temp 99  F (37.2  C) (Oral)   Resp 16    Physical Exam  Constitutional:       General: He is not in acute distress.     Appearance: He is well-developed and normal weight.   HENT:      Head: Normocephalic and atraumatic.      Right Ear: Tympanic membrane, ear canal and external ear normal.      Left Ear: Tympanic membrane, ear canal and external ear normal.      Nose: Mucosal edema and rhinorrhea present.      Right Sinus: Maxillary sinus tenderness and frontal sinus tenderness present.      Left Sinus: Maxillary sinus tenderness and frontal sinus tenderness present.      Mouth/Throat:      Mouth: Mucous membranes are moist.      Pharynx: Oropharynx is clear.   Eyes:      Conjunctiva/sclera:  Conjunctivae normal.   Neck:      Musculoskeletal: Neck supple.   Cardiovascular:      Rate and Rhythm: Normal rate and regular rhythm.   Pulmonary:      Effort: Pulmonary effort is normal.      Breath sounds: Normal breath sounds.   Abdominal:      General: Abdomen is flat. Bowel sounds are normal.   Musculoskeletal: Normal range of motion.   Skin:     General: Skin is warm and dry.      Comments: Skin feels warm and damp, like he has possibly been sweaty with fever   Neurological:      Mental Status: He is alert and oriented to person, place, and time.          Additional History from Old Records Summarized (2): yes  Decision to Obtain Records (1): no  Radiology Tests Summarized or Ordered (1): no  Labs Reviewed or Ordered (1): no  Medicine Test Summarized or Ordered (1): yes  Independent Review of EKG or X-RAY(2 each): no    This note was created using Dragon dictation.  Please excuse any grammatical errors.

## 2021-06-02 NOTE — PROGRESS NOTES
Assessment and Plan:     Class 3 severe obesity due to excess calories with serious comorbidity and body mass index (BMI) of 40.0 to 44.9 in adult (H)  49 y.o. year old male in clinic today to discuss treatment of the following conditions through diet and lifestyle modification and weight loss:  1. Class 3 severe obesity due to excess calories with serious comorbidity and body mass index (BMI) of 40.0 to 44.9 in adult (H)    2. Screen for colon cancer    3. Benign essential hypertension    4. Prediabetes    5. Metabolic syndrome    6. Snores    7. Avitaminosis D      The patient's weight loss result since the last visit was mixed based on weight stability despite recent injury.  He continues to affirm diethylpropion as a medication that is helpful.   - continue diethylpropion.   - IF framework?  Resume.   - continue metformin   - discussed 24 week program.   - return to clinic in 6 weeks.    -Goals of therapy: At this point, is not clear how many changes the patient has been making on a regular basis.  However, he does aspire to be more attentive to his dietary goals over the next 6 weeks.  If he is struggling when I see him next time, I will reintroduce the possibility of surgical bariatrics.    Benign essential hypertension  Normotensive.  No indication to decrease antihypertensive medications at this time.    Metabolic syndrome  We will plan for fasting lab work late in 2019 or early in 2020.  Anticipate interval improvement despite lack of weight loss.    Continue supplements.    Recommend increasing movement throughout the day--sitting less, moving more.  Will increase activity over time to reach a goal of at least 150 minutes of moderate exercise each week.  Behavior modification:  Cognitive restructuring exercises, journaling stressors, triggers for food cravings.  Dietary Intervention:  Reduced calorie, reduced carbohydrates, whole food diet.  Greater than 50% of this 15 minute visit was spent in counseling  regarding patient's obesity, medications, dietary, exercise, and behavior modification recommendations.    Subjective  Patient presents for treatment of chronic, comorbid conditions using intensive therapeutic lifestyle interventions including nutrition, physical activity, and behavior management.   - successes: happy to have lost weight over the past year.  Not content with recent stability.  He has a supportive friend.  He is recovering from from plantar   fascia rupture.   - struggles: evening cravings.  Family and social calories.   - exercise plan: injured foot.  He was in a boot until the last couple of days.  Ruptured plantar fascia.  Has been [facility] with [facility].   -No lightheadedness.  No dizziness.  No fainting.  No falling down.  He continues to take his medications on a regular basis.  History of prediabetes.  Patient does not check his blood sugars.  Tolerant of metformin.   - tracking/journaling: no    - medication side effects: none.  Efficacy: he believes that that the program has been helpful.   - Barriers to losing weight:  Behavior:  social calories    Patient Active Problem List   Diagnosis     Prediabetes     Class 3 severe obesity due to excess calories with serious comorbidity and body mass index (BMI) of 40.0 to 44.9 in adult (H)     Benign essential hypertension     Snores     Metabolic syndrome     Avitaminosis D     Swelling     Acute non-recurrent sinusitis, unspecified location     Rupture of plantar fascia of right foot     Peroneal tendonitis of right lower extremity       Current Outpatient Medications on File Prior to Visit   Medication Sig Dispense Refill     cefdinir (OMNICEF) 300 MG capsule Take 1 capsule (300 mg total) by mouth 2 (two) times a day for 10 days. 20 capsule 0     cholecalciferol, vitamin D3, (VITAMIN D3 ORAL) Take by mouth.       diethylpropion 75 mg TbER Take 75 mg by mouth daily. 30 each 0     hydroCHLOROthiazide  (MICROZIDE) 12.5 mg capsule 1 po qd 90 capsule 1     metFORMIN (GLUCOPHAGE XR) 500 MG 24 hr tablet Take 2 tablets (1,000 mg total) by mouth daily with supper. 180 tablet 1     methylPREDNISolone (MEDROL DOSEPACK) 4 mg tablet Follow package directions       valsartan-hydrochlorothiazide (DIOVAN-HCT) 160-25 mg per tablet Take 1 tablet every day by oral route. 90 tablet 1     No current facility-administered medications on file prior to visit.        Objective:  Vitals:    10/10/19 1529   BP: 134/80   Pulse: 80     Initial Weight: 360 lbs  Weight: (!) 338 lb (153.3 kg)  Weight loss from initial: 22  % Weight loss: 6.11 %    Body mass index is 44.59 kg/m .  No LMP for male patient.  General:  Patient is alert, pleasant, no distress.  Patient is obese.    This note has been dictated using voice recognition software. Any grammatical or context distortions are unintentional and inherent to the software

## 2021-06-03 VITALS
HEART RATE: 80 BPM | WEIGHT: 315 LBS | BODY MASS INDEX: 44.59 KG/M2 | DIASTOLIC BLOOD PRESSURE: 80 MMHG | SYSTOLIC BLOOD PRESSURE: 134 MMHG

## 2021-06-03 VITALS
HEART RATE: 84 BPM | DIASTOLIC BLOOD PRESSURE: 76 MMHG | SYSTOLIC BLOOD PRESSURE: 130 MMHG | TEMPERATURE: 99 F | RESPIRATION RATE: 16 BRPM

## 2021-06-03 VITALS — WEIGHT: 315 LBS | BODY MASS INDEX: 43.8 KG/M2

## 2021-06-03 VITALS — WEIGHT: 315 LBS | HEIGHT: 73 IN | BODY MASS INDEX: 41.75 KG/M2

## 2021-06-03 VITALS — HEIGHT: 73 IN | WEIGHT: 315 LBS | BODY MASS INDEX: 41.75 KG/M2

## 2021-06-03 NOTE — PROGRESS NOTES
Assessment and Plan  1. Soft tissue mass  Discussed with the patient that I think it is most likely a benign inclusion cyst of the skin.  Given the location and the rapid growth, as he had not felt it into the last day, will obtain an ultrasound to rule out enlarged lymph node.  Reassured him that it is not something that should interfere with his colonoscopy this week.    - US Groin Non Vascular Left; Future      Chief complaint:  Hernia (left abdominal lump, colonoscopy scheduled for Tuesday. )    HPI:  Attila Rocha is a 49 y.o. male who comes in today due to left abdominal palpable mass.  He noted it in the last 1 to 2 days.  He does not have pain over the area.  He is concerned because he has a colonoscopy scheduled this week and wants to make sure it safe for him to have a colonoscopy.  He has had no overlying skin changes.  He has no fevers, chills, weight loss, skin rashes.  He has not noticed any other lumps or bumps.    PMH:   Patient Active Problem List   Diagnosis     Prediabetes     Class 3 severe obesity due to excess calories with serious comorbidity and body mass index (BMI) of 40.0 to 44.9 in adult (H)     Benign essential hypertension     Snores     Metabolic syndrome     Avitaminosis D     Swelling     Acute non-recurrent sinusitis, unspecified location     Rupture of plantar fascia of right foot     Peroneal tendonitis of right lower extremity       Past Medical History:   Diagnosis Date     Anxiety      Hypertension      Sleep apnea        Current Medications:   Current Outpatient Medications on File Prior to Visit   Medication Sig Dispense Refill     cholecalciferol, vitamin D3, (VITAMIN D3 ORAL) Take by mouth.       diethylpropion 75 mg TbER Take 75 mg by mouth daily. 30 each 0     hydroCHLOROthiazide (MICROZIDE) 12.5 mg capsule 1 po qd 90 capsule 1     metFORMIN (GLUCOPHAGE XR) 500 MG 24 hr tablet Take 2 tablets (1,000 mg total) by mouth daily with supper. 180 tablet 1      valsartan-hydrochlorothiazide (DIOVAN-HCT) 160-25 mg per tablet Take 1 tablet every day by oral route. 90 tablet 1     methylPREDNISolone (MEDROL DOSEPACK) 4 mg tablet Follow package directions       No current facility-administered medications on file prior to visit.        Allergies:  is allergic to penicillins.    SH/FH:  Social History and Family History reviewed and updated.   Tobacco Status:  He  reports that he has never smoked. He has never used smokeless tobacco.    Review of Systems:  A 10 point review of systems was done  No fever  No abdominal pain, no nausea or vomiting  No skin changes  No unintentional weight loss    Vitals:    11/23/19 1514   BP: (!) 144/91   Patient Site: Right Arm   Patient Position: Sitting   Cuff Size: Adult Large   Pulse: (!) 103   Resp: 20   Temp: 98.1  F (36.7  C)   TempSrc: Oral   SpO2: 95%   Weight: (!) 339 lb 9 oz (154 kg)     Wt Readings from Last 3 Encounters:   11/23/19 (!) 339 lb 9 oz (154 kg)   11/20/19 (!) 335 lb (152 kg)   10/10/19 (!) 338 lb (153.3 kg)       Physical Exam:  GENERAL: Alert, cooperative, well-appearing  Skin: No overlying skin changes.  Abdomen: No distention, no pain on palpation.  No palpable organomegaly.  Groin: Just lateral to the left inguinal area there is a palpable superficial mass.  It measures 4 cm x 5 cm.  It is nontender.  It is mobile.  There is no overlying skin changes.  There is no fluctuance.

## 2021-06-03 NOTE — ANESTHESIA POSTPROCEDURE EVALUATION
Patient: Attila Rocha  COLONOSCOPY  Anesthesia type: MAC    Patient location: Phase II Recovery  Last vitals:   Vitals Value Taken Time   /90 11/26/2019 11:30 AM   Temp  11/26/2019    Pulse 76 11/26/2019 11:30 AM   Resp 14 11/26/2019 11:30 AM   SpO2 93 % 11/26/2019 11:30 AM     Post vital signs: stable  Level of consciousness: awake and responds to simple questions  Post-anesthesia pain: pain controlled  Post-anesthesia nausea and vomiting: no  Pulmonary: unassisted, return to baseline  Cardiovascular: stable and blood pressure at baseline  Hydration: adequate  Anesthetic events: no    QCDR Measures:  ASA# 11 - Alexandra-op Cardiac Arrest: ASA11B - Patient did NOT experience unanticipated cardiac arrest  ASA# 12 - Alexandra-op Mortality Rate: ASA12B - Patient did NOT die  ASA# 13 - PACU Re-Intubation Rate: NA - No ETT / LMA used for case  ASA# 10 - Composite Anes Safety: ASA10A - No serious adverse event    Additional Notes:    Vitals:    11/26/19 1050 11/26/19 1100 11/26/19 1115 11/26/19 1130   BP: 137/84 136/88 143/88 128/90   Pulse: 73 76 74 76   Resp: 18 18  14   Temp: 36.3  C (97.3  F)      TempSrc: Temporal      SpO2: 99% 99% 94% 93%   Weight:       Height:

## 2021-06-03 NOTE — PROGRESS NOTES
Assessment/Plan:         Attila was seen today for groin pain.    Diagnoses and all orders for this visit:    Cellulitis and abscess of trunk: initially a small lump that has since become more rapidly cellulitic with a discreet abscess. We did drain this today in the clinic (See procedure note below). Given the cellulitis, started on doxycycline. Recommend he see his PCP next week to re-evaluate the skin there for resolution - consider surgical consultation if worsening or not resolving completely.   -     US Soft Tissue Penis; Future  -     US Soft Tissue Penis  -     Culture, Wound  -     doxycycline (VIBRA-TABS) 100 MG tablet; Take 1 tablet (100 mg total) by mouth 2 (two) times a day for 10 days.      Soft tissue ultrasound:  IMPRESSION:  Subcutaneous abscess 3 cm diameter with surrounding subcutaneous edema inferior  most left lower quadrant abdominal wall just above the inguinal crease.    Incision and drainage  Date/Time: 11/29/2019 11:05 AM  Performed by: Sia Ibrahim MD  Authorized by: Sia Ibrahim MD       Universal Protocol    Site marked: Yes    Prior images obtained and reviewed: Yes    Required items: required blood products, implants, devices, and special equipment available    Patient identity confirmed: verbally with patient    Reevaluation: NA - No sedation, light sedation, or local anesthesia    Confirmation checklist: patient's identity using two indicators, relevant allergies, procedure was appropriate and matched the consent or emergent situation and correct equipment/implants were available    Time out: Immediately prior to procedure a time out was called to verify the correct patient, procedure, equipment, support staff and site/side marked as required    Universal Protocol: Joint Commission Universal Protocol was followed    Preparation: Patient was prepped and draped in the usual sterile fashion    Indications/Location  Type: abscess  Body area: trunk  Location:  abdomen    Anesthesia    Local anesthesia used?: Yes    Local anesthetic: lidocaine 2% with epinephrine    Sedation  Patient sedation: No    Procedure Details  Scalpel size: 11  Incision type: single straight  Incision depth: dermal  Drainage: purulent  Drainage amount: copious  Wound treatment: wound left open  Packing material: none      Post-procedure    Patient tolerance: Patient tolerated the procedure well with no immediate complications   Length of time physician present for 1:1 monitoring during sedation: 0                    Plan of care was discussed with the patient and/or guardian. They verbalize understanding of the treatment options and plan of care.    Sia Ibrahim       Subjective:        Attila Rocha is a 49 y.o. male who presents for swelling on his let lower abdomen.   Initially had a small lump there about 8d ago - was seen 11/23 for evaluation of swelling there pre-colonoscopy and is scheduled for an ultrasound tomorrow for evaluation (believed likely benign cyst).  It became painful, red, warm, swollen over the last couple of days.  Over the last 24hrs it has rapidly enlarged, now the size of an Egg and did spontaneously drain purulent malodorous material when squeezed this morning but was not a lot and it remains similar in size despite the purulent drainage.   The swelling, redness and warmth has also spread to the left and right side 'like the width of a pencil under the skin'   It is hard, painful to touch.  He does not have fever, nausea, vomiting, malaise.   No red lines.   Does not extend into his groin - no penile or testicular pain or swelling.       At baseline, he has obesity, pre-diabetes, hypertension.  He takes flonase, Valsartan, metformin  Allergy to penicillin.          Objective:       Blood pressure 137/83, pulse 92, temperature 97.8  F (36.6  C), temperature source Oral, resp. rate 16, weight (!) 338 lb 14.4 oz (153.7 kg), peak flow 96 L/min.   Gen: well appearing  overall, no distress.  Skin: left lower abdomen (in pannus), there is a firm indurated area just above his left iliac crest that is more firm than fluctuant, but does feel like there is a larger, deep firm mass under the skin. Additionally, he has another 4cm firm cylindrical swelling extending to the right side of the abscess that is firm and painful, minimally erythematous. There is additionally another 3cm extension similarly to the left.   Moderate pain to any touch. There is a head with a small amount of purulent drainage.     Results for orders placed or performed in visit on 02/15/19   Glycosylated Hemoglobin A1c   Result Value Ref Range    Hemoglobin A1c 5.9 3.5 - 6.0 %   Vitamin D, Total (25-Hydroxy)   Result Value Ref Range    Vitamin D, Total (25-Hydroxy) 26.6 (L) 30.0 - 80.0 ng/mL

## 2021-06-03 NOTE — PATIENT INSTRUCTIONS - HE
1. Ultrasound ordered for the area  --  I think most likely a benign (non cancerous) skin finding, maybe a lymph node    2. Should not interfere with your colonoscopy

## 2021-06-03 NOTE — TELEPHONE ENCOUNTER
RN cannot approve Refill Request    RN can NOT refill this medication med is not covered by policy/route to provider. Last office visit: 10/4/2019 Katlin Padron MD Last Physical: Visit date not found Last MTM visit: Visit date not found Last visit same specialty: 10/10/2019 Johnson Jeffrey MD.  Next visit within 3 mo: Visit date not found  Next physical within 3 mo: Visit date not found      Amisha Valerio, [facility] Triage/Med Refill 11/12/2019    Requested Prescriptions   Pending Prescriptions Disp Refills     cefdinir (OMNICEF) 300 MG capsule [Pharmacy Med Name: Cefdinir Oral Capsule 300 MG] 20 capsule 0     Sig: Take 1 capsule (300 mg total) by mouth 2 (two) times a day for 10 days.       There is no refill protocol information for this order

## 2021-06-03 NOTE — ANESTHESIA CARE TRANSFER NOTE
Last vitals:   Vitals:    11/26/19 1050   BP: 137/84   Pulse: 73   Resp: 18   Temp: 36.3  C (97.3  F)   SpO2: 99%     Pt brought to phase 2 on 6L O2. Monitors applied. VSS.    Patient's level of consciousness is drowsy  Spontaneous respirations: yes  Maintains airway independently: yes  Dentition unchanged: yes  Oropharynx: oropharynx clear of all foreign objects    QCDR Measures:  ASA# 20 - Surgical Safety Checklist: WHO surgical safety checklist completed prior to induction    PQRS# 430 - Adult PONV Prevention: NA - Not adult patient, not GA or 3 or more risk factors NOT present  ASA# 8 - Peds PONV Prevention: NA - Not pediatric patient, not GA or 2 or more risk factors NOT present  PQRS# 424 - Alexandra-op Temp Management: NA - MAC anesthesia or case < 60 minutes  PQRS# 426 - PACU Transfer Protocol: - Transfer of care checklist used  ASA# 14 - Acute Post-op Pain: ASA14B - Patient did NOT experience pain >= 7 out of 10

## 2021-06-03 NOTE — ANESTHESIA PREPROCEDURE EVALUATION
Anesthesia Evaluation      Patient summary reviewed   History of anesthetic complications (Mother has a hx of pseudocholinesterase deficiency   )     Airway   Mallampati: III  Neck ROM: full   Pulmonary     breath sounds clear to auscultation  (+) sleep apnea (Has CPAP - uses some nights) on CPAP, moderate,                          Cardiovascular   Exercise tolerance: > or = 4 METS  (+) hypertension, ,     (-) angina  Rhythm: regular        Neuro/Psych    (+) anxiety/panic attacks,     Comments: Hx of back surgery    Endo/Other    (+) arthritis, obesity,      GI/Hepatic/Renal - negative ROS      Other findings:     NPO > 8 hrs     Results for JUDY JENKINS (MRN [id number]) as of 11/26/2019    2/15/2019 08:27  Hemoglobin A1c: 5.9        Dental    (+) poor dentition and caps                       Anesthesia Plan  Planned anesthetic: MAC      NO SUX    ASA 3     Anesthetic plan and risks discussed with: patient  Anesthesia plan special considerations: antiemetics,   Post-op plan: routine recovery

## 2021-06-04 VITALS
HEART RATE: 103 BPM | BODY MASS INDEX: 44.8 KG/M2 | OXYGEN SATURATION: 95 % | RESPIRATION RATE: 20 BRPM | DIASTOLIC BLOOD PRESSURE: 91 MMHG | WEIGHT: 315 LBS | TEMPERATURE: 98.1 F | SYSTOLIC BLOOD PRESSURE: 144 MMHG

## 2021-06-04 VITALS
WEIGHT: 315 LBS | DIASTOLIC BLOOD PRESSURE: 83 MMHG | RESPIRATION RATE: 16 BRPM | SYSTOLIC BLOOD PRESSURE: 137 MMHG | TEMPERATURE: 97.8 F | HEART RATE: 92 BPM | BODY MASS INDEX: 44.71 KG/M2

## 2021-06-04 VITALS — BODY MASS INDEX: 41.75 KG/M2 | WEIGHT: 315 LBS | HEIGHT: 73 IN

## 2021-06-04 VITALS
WEIGHT: 315 LBS | BODY MASS INDEX: 45.46 KG/M2 | SYSTOLIC BLOOD PRESSURE: 132 MMHG | TEMPERATURE: 98.5 F | HEART RATE: 84 BPM | DIASTOLIC BLOOD PRESSURE: 84 MMHG

## 2021-06-04 VITALS
WEIGHT: 315 LBS | SYSTOLIC BLOOD PRESSURE: 128 MMHG | HEIGHT: 73 IN | BODY MASS INDEX: 41.75 KG/M2 | DIASTOLIC BLOOD PRESSURE: 80 MMHG | HEART RATE: 88 BPM

## 2021-06-04 VITALS — BODY MASS INDEX: 46.07 KG/M2 | WEIGHT: 315 LBS

## 2021-06-04 VITALS — BODY MASS INDEX: 46.59 KG/M2 | WEIGHT: 315 LBS

## 2021-06-04 VITALS — BODY MASS INDEX: 41.75 KG/M2 | HEIGHT: 73 IN | WEIGHT: 315 LBS

## 2021-06-04 VITALS — WEIGHT: 315 LBS | BODY MASS INDEX: 43.2 KG/M2

## 2021-06-04 VITALS — HEIGHT: 73 IN | BODY MASS INDEX: 41.75 KG/M2 | WEIGHT: 315 LBS

## 2021-06-04 VITALS — WEIGHT: 315 LBS | BODY MASS INDEX: 45.08 KG/M2

## 2021-06-04 NOTE — TELEPHONE ENCOUNTER
Medication Question or Clarification    Who is calling: Pharmacy: [phone]     What medication are you calling about? (include dose and sig)   diethylpropion 75 mg TbER 90 each 0 12/6/2019     Sig - Route: Take 75 mg by mouth daily. - Oral    Sent to pharmacy as: diethylpropion ER 75 mg tablet,extended release        Who prescribed the medication?: PCP    What is your question/concern?:   Med not available requesting alternative.    Pharmacy: [id number] - [facility] [place] [address] [place] [address]    Okay to leave a detailed message?: Yes  Site CMT - Please call the pharmacy to obtain any additional needed information.

## 2021-06-04 NOTE — PROGRESS NOTES
Assessment and Plan:     Class 3 severe obesity due to excess calories with serious comorbidity and body mass index (BMI) of 40.0 to 44.9 in adult (H)  49 y.o. year old male in clinic today to discuss treatment of the following conditions through diet and lifestyle modification and weight loss:  1. Class 3 severe obesity due to excess calories with serious comorbidity and body mass index (BMI) of 40.0 to 44.9 in adult (H)    2. Cutaneous abscess of groin    3. Snores    4. Metabolic syndrome      The patient's weight loss result since the last visit was unsuccessful as he is gained weight.  The patient says that he is struggling with self accountability.  He believes that that is appropriate on is helpful at this time but is looking for more support.  Previously, he did well through a structured program with jimena as part of the nutritional plan.  -Enroll in the 24-week program.  He will start this in January.  -Continue diethylpropion.  -Return to clinic in 2-3 months for a visit with myself.  -The patient is interested in surgical bariatrics however, this is excluded from his health plan through his wife's employer.    Cutaneous abscess of groin  No component of cellulitic infection.  The abscess is still draining.  I recommended warm compresses and continued observation.  He will complete his course of oral antibiotics.  If this worsens early next week, I would invite the patient back to clinic for additional I&D.  This may be a loculated lesion that completely drained.    Continue supplements.    Recommend increasing movement throughout the day--sitting less, moving more.  Will increase activity over time to reach a goal of at least 150 minutes of moderate exercise each week.  Behavior modification:  Cognitive restructuring exercises, journaling stressors, triggers for food cravings.  Dietary Intervention:  Reduced calorie, reduced carbohydrates, whole food diet.  Greater than 50% of this 25 minute visit was  "spent in counseling regarding patient's obesity, medications, dietary, exercise, and behavior modification recommendations.    Subjective  Patient presents for treatment of chronic, comorbid conditions using intensive therapeutic lifestyle interventions including nutrition, physical activity, and behavior management.   - struggles: not working.  Interested in 24 week comprehensive.  He does not think he has a knowledge gap.  He is looking for more accountability.  \"Good change requires accountability.\"  He did a similar program in the past. Low was 290.     - joined PAC.  Physical barriers.     - lack of traction.   - the patient's abscess was I&Ded.  This was drained.  He took medication. Now improved.  Some residual pain.      Patient Active Problem List   Diagnosis     Prediabetes     Class 3 severe obesity due to excess calories with serious comorbidity and body mass index (BMI) of 40.0 to 44.9 in adult (H)     Benign essential hypertension     Snores     Metabolic syndrome     Avitaminosis D     Swelling     Acute non-recurrent sinusitis, unspecified location     Rupture of plantar fascia of right foot     Peroneal tendonitis of right lower extremity     Family history of colon cancer     Cutaneous abscess of groin       Current Outpatient Medications on File Prior to Visit   Medication Sig Dispense Refill     cholecalciferol, vitamin D3, (VITAMIN D3 ORAL) Take by mouth.       doxycycline (VIBRA-TABS) 100 MG tablet Take 1 tablet (100 mg total) by mouth 2 (two) times a day for 10 days. 20 tablet 0     hydroCHLOROthiazide (MICROZIDE) 12.5 mg capsule 1 po qd 90 capsule 1     metFORMIN (GLUCOPHAGE XR) 500 MG 24 hr tablet Take 2 tablets (1,000 mg total) by mouth daily with supper. 180 tablet 1     valsartan-hydrochlorothiazide (DIOVAN-HCT) 160-25 mg per tablet Take 1 tablet every day by oral route. 90 tablet 1     [DISCONTINUED] diethylpropion 75 mg TbER Take 75 mg by mouth daily. 30 each 0     [DISCONTINUED] " fluticasone propionate (FLONASE) 50 mcg/actuation nasal spray 2 sprays.       No current facility-administered medications on file prior to visit.        Objective:  Vitals:    12/06/19 1301   BP: 132/84   Pulse: 84   Temp: 98.5  F (36.9  C)     Initial Weight: 360 lbs  Weight: (!) 344 lb 9.6 oz (156.3 kg)  Weight loss from initial: 15.4  % Weight loss: 4.28 %    Body mass index is 45.46 kg/m .  No LMP for male patient.  General:  Patient is alert, pleasant, no distress.  Patient is obese.  Skin: left inguinal crease without erythema.  Non-tender.  There is a palpable 2 cm subcutaneous mass that expresses pus through small superficial opening.  Purulent.  Non-fluctuant.     This note has been dictated using voice recognition software. Any grammatical or context distortions are unintentional and inherent to the software

## 2021-06-05 VITALS — WEIGHT: 315 LBS | BODY MASS INDEX: 43.34 KG/M2

## 2021-06-05 VITALS
OXYGEN SATURATION: 98 % | SYSTOLIC BLOOD PRESSURE: 130 MMHG | TEMPERATURE: 98.6 F | HEART RATE: 72 BPM | DIASTOLIC BLOOD PRESSURE: 72 MMHG | RESPIRATION RATE: 20 BRPM

## 2021-06-05 VITALS
DIASTOLIC BLOOD PRESSURE: 80 MMHG | HEART RATE: 72 BPM | SYSTOLIC BLOOD PRESSURE: 132 MMHG | OXYGEN SATURATION: 98 % | WEIGHT: 315 LBS | TEMPERATURE: 98.9 F | RESPIRATION RATE: 20 BRPM | BODY MASS INDEX: 43.2 KG/M2

## 2021-06-05 NOTE — PROGRESS NOTES
[facility] Daily Progress     Patient Name: Attila Rocha  Date: 2020  Visit #: 2/10  Referring Provider: Holter, Todd  Referring Diagnosis: Acute low back pain  Visit Diagnosis:     ICD-10-CM    1. Acute right-sided low back pain with right-sided sciatica M54.41    2. Generalized muscle weakness M62.81    3. Decreased range of motion of trunk and back M25.60        Assessment:     Pt demo's great improvement to trunk flexion ROM but continued tightness R SB with muscle weakness.    Patient is benefitting from skilled physical therapy and is making steady progress toward functional goals.  Patient is appropriate to continue with skilled physical therapy intervention, as indicated by initial plan of care.    Goal Status:  Pt. will demonstrate/verbalize independence in self-management of condition in : 12 weeks - IMPROVING    Pt. will be able to walk : 60 minutes;with less pain;with less difficulty;for household mobility;for community mobility;for work;for exercise/recreation;in 12 weeks - IMPROVING    Patient will stand : 60 minutes;with less pain;with less difficultty;for work;for home chores;in 12 weeks - ongoing - with R leg sx    Pt. will bend: to dress;to clean;to do yard work;with less pain;with less difficulty;for self care;for work;for exercise/recreation;in 12 weeks    Patient will transfer: sit/stand;supine/sit;for car;for toileting;for in/out of bed;for in/out of chair;with less pain;with less difficulty;in 12 weeks      Plan / Patient Education:     Continue with initial plan of care.    Assess response to lumbar twist with nerve glide, bridge and SLR.  Attempt R LE nerve glide with adduction.  Reassess R SB ROM and perform mobs if still limited.  Progress strength as able.      Subjective:     Pain Ratin  Pt reports he will be getting a OH on  for continued pain relief after meeting with MD last week.   Pt reports back jt pain seems better - still having pain into R low back  and into R leg with numbness.    Patient Outcome Measures:  Modified Oswestry Low Back Pain Disablity Questionnaire  in %: 46     Scores range from 0-100%, where a score of 0% represents minimal pain and maximal function. The minimal clinically important difference is a score reduction of 12%. FROM INITIAL    Objective:     Trunk flexion min limited with fingertips almost to toes (was significantly limited with fingertips to distal thighs), ext WNL without pain, R SB min limited with fingertips to distal knee jt with stiffness R low back, L SB WNL without pain    Increased R low back pain with anterior pelvic tight and with hip flexion without TA iso    No increased R foot numbness with R SLR    Fatigue with R SLR after about 7-8 reps    Treatment Today      TREATMENT MINUTES COMMENTS   Evaluation     Self-care/ Home management     Manual therapy     Neuromuscular Re-education     Therapeutic Activity     Therapeutic Exercises 25 Assessed response to HEP and activity level. Reassessed trunk ROM and R SLR and discussed results. Performed, reviewed and advanced HEP with verbal cues and explanations for reasoning with exercises per pt instructions and printed AVS for home.   Gait training     Modality__________________                Total 25    Blank areas are intentional and mean the treatment did not include these items.       Layne Lorenzo PT, DPT, OCS, CLT  1/31/2020  9:46 AM

## 2021-06-05 NOTE — PROGRESS NOTES
[facility]   Initial Evaluation    Patient Name: Attila Rocha  Date of evaluation: 1/17/2020  Visit number: 1/10  Referring Provider: Holter, Todd  Referring Diagnosis: Acute low back pain  Visit Diagnosis:     ICD-10-CM    1. Acute right-sided low back pain with right-sided sciatica M54.41    2. Generalized muscle weakness M62.81    3. Decreased range of motion of trunk and back M25.60        Assessment:        Attila Rocha is a 49 y.o. male who presents to therapy today with chief complaints of R sided back pain and R leg sx. Onset date of sx was a couple of weeks ago when he went to move his snowblower sideways in his garage and felt something happen in his back.  Pt reported h/o back injury in college 30 years ago with resulting L5 discectomy, R knee surgery 2014 and another back injury 3/2017 when helping a friend move that resulted in R foot numbness and some in R thigh.  Pain symptoms are moderate at this time with almost finishing oral steroids and using gabapentin.  Functional impairments include difficulty and pain with transferring and bed mobility, walking, standing, stairs, lifting, squating/bending and house/yard work.  Pt demo's signs and sx consistent with lumbar disc and facet dysfunction with extension preference with sx, decreased trunk ROM and core/trunk strength deficits.     Pt. is appropriate for skilled PT intervention as outlined in the Plan of Care (POC).  Pt. is a good candidate for skilled PT services to improve pain levels and function.    Goals:  Pt. will demonstrate/verbalize independence in self-management of condition in : 12 weeks  Pt. will be able to walk : 60 minutes;with less pain;with less difficulty;for household mobility;for community mobility;for work;for exercise/recreation;in 12 weeks  Patient will stand : 60 minutes;with less pain;with less difficultty;for work;for home chores;in 12 weeks  Pt. will bend: to dress;to clean;to do yard work;with less  pain;with less difficulty;for self care;for work;for exercise/recreation;in 12 weeks  Patient will transfer: sit/stand;supine/sit;for car;for toileting;for in/out of bed;for in/out of chair;with less pain;with less difficulty;in 12 weeks    Patient's expectations/goals are realistic.    Barriers to Learning or Achieving Goals:  No Barriers.       Plan / Patient Instructions:      Plan for next visit: Assess response to prone ext programming and beginning core stabs. Progress strength as able.    Plan of Care:   Communication with: Referral Source  Patient Related Instruction: Nature of Condition;Treatment plan and rationale;Self Care instruction;Basis of treatment;Body mechanics;Posture;Precautions;Next steps;Expected outcome  Times per Week: 1  Number of Weeks: 12  Number of Visits: 12  Discharge Planning: when indicated  Precautions / Restrictions : none  Therapeutic Exercise: ROM;Stretching;Strengthening  Neuromuscular Reeducation: posture;TNE;core;other  Neuromuscular Re-education: neurodynamics  Manual Therapy: soft tissue mobilization;joint mobilization;muscle energy  Functional Training (ADL's): self care;ADL's      Treatment techniques, plan of care, and goals were discussed with the patient.  The patient agrees to the plan as outlined.  The plan of care is dynamic and will be modified on an ongoing basis.       Subjective:       Social information:   Occupation:   Work Status:Working full time    History of Present Illness:  Pt reports he was moving his snowblower in his garage and felt something happen in his back. By that night pt could barely move and same the next day so pt went into urgent care ortho clinic. Pt had MRI performed and showed herniated disc. Pt was placed on oral steroids and gabapentin (was doing 3x/day - got too many side effects so is now taking 600 mg only at night), and then pt had what he believes was a lumbar facet injection 3 days ago. Pt had some relief for the first day  after injection but has felt some return of pain after two days.     Pt reports L5 herniated disc with surgery 30 years ago and pt has struggled with R LE numbness for a couple of years that affects into lateral side of R foot. Pt feels like this numbness has increased with new injury.     Pt will return to see MD on the .       Pain Ratin  Pain rating at best: 6  Pain rating at worst: 6  Pain description: aching, numbness, pain and sharp    Patient reports benefit from:  rest         Objective:      Patient Outcome Measures :    Modified Oswestry Low Back Pain Disablity Questionnaire  in %: 46     Scores range from 0-100%, where a score of 0% represents minimal pain and maximal function. The minimal clinically important difference is a score reduction of 12%.    Precautions/Restrictions: None  Involved side: Bilateral  Posture Observation:      General sitting posture is  fair.  Gait: Amb with antalgic gait R LE    Palpation: Hypo lumbar spine to P/A pressures  Non tender lumbar paraspinals with min tone R lumbar side    ROM: Trunk flexion significantly limited with fingertips to distal thighs with increased back pain, ext WNL without pain, R SB mod limited with increased back pain R side, L SB WNL without pain change    Strength: B hip flexors 4+/5 with pain on R side, B quad and DF 5/5 without pain, R hip abd 4/5, R hip ext 4/5 with min pain, L hip ext 4+/5 with min R low back pain    Sensation: Diminished to light touch R lateral foot    Special tests: Repeated extension with no increase in sx, repeated flexion with increased back pain    Treatment Today      TREATMENT MINUTES COMMENTS   Evaluation 30 Lumbar   Self-care/ Home management     Manual therapy     Neuromuscular Re-education     Therapeutic Activity     Therapeutic Exercises 24 Educated pt on centralization with extension bias prone progression HEP and initiated beginning core/trunk stabs per pt instructions and printed AVS for home.   Gait  training     Modality__________________                Total 54    Blank areas are intentional and mean the treatment did not include these items.        PT Evaluation Code: (Please list factors)  Patient History/Comorbidities: h/o back injury  Examination: lumbar  Clinical Presentation: stable  Clinical Decision Making: low    Patient History/  Comorbidities Examination  (body structures and functions, activity limitations, and/or participation restrictions) Clinical Presentation Clinical Decision Making (Complexity)   No documented Comorbidities or personal factors 1-2 Elements Stable and/or uncomplicated Low   1-2 documented comorbidities or personal factor 3 Elements Evolving clinical presentation with changing characteristics Moderate   3-4 documented comorbidities or personal factors 4 or more Unstable and unpredictable High       Layne Lorenzo PT, DPT, OCS, CLT  1/17/2020  10:08 AM

## 2021-06-05 NOTE — PROGRESS NOTES
Initial 24 Week Lifestyle Program Nutrition Evaluation    Assessment:  Pt is being seen today for 24 Week Lifestyle Program nutritional evaluation. Today we reviewed current eating habits and level of physical activity, and instructed on the changes that are required for successful weight loss outcomes.    Start Date: 1/23/2020  Graduation Date: 7/9/2020    Personal Goals: to achieve weight loss and maintain   Personal goal weight: 285 lbs     Meal Plan: 4429-0257  Meal Replacements: Shakes and Bars   Weight Loss Medication:Diethylpropion, Metformin      Pt Active Problem List Diagnosis:  Patient Active Problem List   Diagnosis     Prediabetes     Class 3 severe obesity due to excess calories with serious comorbidity and body mass index (BMI) of 45.0 to 49.9 in adult (H)     Benign essential hypertension     Snores     Metabolic syndrome     Avitaminosis D     Swelling     Acute non-recurrent sinusitis, unspecified location     Rupture of plantar fascia of right foot     Peroneal tendonitis of right lower extremity     Family history of colon cancer     Cutaneous abscess of groin       Diabetes: No    Pt's Initial Weight: 360 lbs  Weight: (!) 349 lb 14.4 oz (158.7 kg)  Weight loss from initial: 10.1  % Weight loss: 2.81 %    BMI: Body mass index is 46.8 kg/m .  IBW: 178-188 lbs      Estimated RMR ([place]-St Jeor equation): 2497 calories  Recommended Protein Intake:  60-80 grams of protein/day     Vitamin/Mineral Supplementation: Vitamin D3    Food Recall/History:  Breakfast: eggs with piece of sausage, occasional slice of toast (whole grain bread) or low sugar greek yogurt or Protein Shake   Snack: none   Lunch: Fast Food or skipped or leftovers from the night before or Chili or sandwich (ham or turkey)   Snack: regular pop or candy bar (on occasion)   Dinner: steak, mashed potatoes, and green beans or pork tenderloin or chicken with pasta and cornbread or baked spaghetti   Snack: whatever is available-varies  (snack cakes or nuts or leftovers or fruit)     Beverages:  Water: 2-3 bottles/day   Caffeine: Diet pop 1-2 times/week   Alcohol: none   Other: Black Tea 0-1 times/day     Exercise: no set regimen due to back issues, has started some PT    Nutrition Diagnosis (PES statement):  Overweight/Obesity (NC 3.3) related to overeating and poor lifestyle habits as evidenced by patient's subjective statements (excessive energy intake especially in the evenings) and BMI of 46.80 kg/m2.     Nutrition Intervention:    1. Reviewed the 24 Week Lifestyle Program guidelines and schedule.  2. Reviewed 4049-4145 calorie meal plan and food products.  3. Discussed building a healthy plate of 300-400 calories for one meal per day, focusing on 20-30g of lean protein and less than 25% of plate carbohydrates.  4. Encouraged patient to follow meal plan with Bariatrix products for 2 meals and 2 snacks per day.    Goals Established By Patient:  1.  Patient to trial 2 Protein Shake + 2 Protein Bars and a Lean/Green Meal once daily.     Handouts Provided:  Recipe Resources      Monitoring and Evaluation:    Follow up with Health  weekly and RD monthly.     Time in: 8:00  Time out: 8:30    ABN signed: Yes

## 2021-06-05 NOTE — PROGRESS NOTES
"Assessment and Plan:     Class 3 severe obesity due to excess calories with serious comorbidity and body mass index (BMI) of 45.0 to 49.9 in adult (H)  49 y.o. year old male in clinic today to discuss treatment of the following conditions through diet and lifestyle modification and weight loss:  1. Class 3 severe obesity due to excess calories with serious comorbidity and body mass index (BMI) of 45.0 to 49.9 in adult (H)    2. Avitaminosis D    3. Benign essential hypertension    4. Prediabetes      The patient's weight loss result since the last visit was mixed based on weight gain in context of injury.  He is excited to start the 24 week program. He is interested in meal replacement   - continue medications   - start meal replacement   - enroll in 24 week program    - taper/DC gabapentin    Continue supplements.    Recommend increasing movement throughout the day--sitting less, moving more.  Will increase activity over time to reach a goal of at least 150 minutes of moderate exercise each week.  Behavior modification:  Cognitive restructuring exercises, journaling stressors, triggers for food cravings.  Dietary Intervention:  Reduced calorie, reduced carbohydrates, whole food diet.  Greater than 50% of this 15 minute visit was spent in counseling regarding patient's obesity, medications, dietary, exercise, and behavior modification recommendations.    Subjective  Patient presents for treatment of chronic, comorbid conditions using intensive therapeutic lifestyle interventions including nutrition, physical activity, and behavior management.   - successes: excited for the new g   - struggles: \"rough few weeks.\"  He went to [facility] 1/3 with back injury.  \"Back catch.\"  Resumed PT.  He has been on steroids for the past few weeks in bed.  Some improvement.  On gabapentin currently.     - tracking/journaling: ad zach   - following nutritional plan: ad zach.  Deviations from plan: ?   - hunger: ok   - medication side " effects: none   - Barriers to losing weight:  Behavior:  inadequate exercise    Patient Active Problem List   Diagnosis     Prediabetes     Class 3 severe obesity due to excess calories with serious comorbidity and body mass index (BMI) of 45.0 to 49.9 in adult (H)     Benign essential hypertension     Snores     Metabolic syndrome     Avitaminosis D     Swelling     Acute non-recurrent sinusitis, unspecified location     Rupture of plantar fascia of right foot     Peroneal tendonitis of right lower extremity     Family history of colon cancer     Cutaneous abscess of groin       Current Outpatient Medications on File Prior to Visit   Medication Sig Dispense Refill     cholecalciferol, vitamin D3, (VITAMIN D3 ORAL) Take by mouth.       diethylpropion 75 mg TbER Take 75 mg by mouth daily. 90 each 0     fluticasone propionate (FLONASE) 50 mcg/actuation nasal spray 2 sprays into each nostril daily. 16 g 5     hydroCHLOROthiazide (MICROZIDE) 12.5 mg capsule 1 po qd 90 capsule 1     metFORMIN (GLUCOPHAGE XR) 500 MG 24 hr tablet Take 2 tablets (1,000 mg total) by mouth daily with supper. 180 tablet 1     valsartan-hydrochlorothiazide (DIOVAN-HCT) 160-25 mg per tablet Take 1 tablet every day by oral route. 90 tablet 1     gabapentin (NEURONTIN) 300 MG capsule 1 tablet in the morning 1 in the afternoon and 1-3 tablets at nighttime 90 capsule 0     No current facility-administered medications on file prior to visit.        Objective:  Vitals:    01/23/20 0806   BP: 128/80   Pulse: 88     Initial Weight: 360 lbs  Weight: (!) 349 lb (158.3 kg)  Weight loss from initial: 11  % Weight loss: 3.06 %    Body mass index is 46.68 kg/m .  No LMP for male patient.  General:  Patient is alert, pleasant, no distress.  Patient is obese.    This note has been dictated using voice recognition software. Any grammatical or context distortions are unintentional and inherent to the software

## 2021-06-06 NOTE — PROGRESS NOTES
ESTEFANY GARG Reason for Visit: 24 Week Healthy Lifestyle Program Initial Visit - Health Coaching  Referred by: Johnson Jeffrey  Progress Notes:  New Weight Management Health Coaching Note  Attila Rocha   MRN: [id number]  : 1970  ADAMARIS: 2020    Health  Visit #: 1  In Person Visit: yes    ASSESSMENT:  Initial Weight: 360 lbs  Initial Start Date:2020  Graduation Date:2020  Current Weight (lbs): 348.3 lbs  Average Daily Water (ounces): 2-3 bottles per day (24-36 oz)?  Weight Loss Medication: Diethylpropion and Metformin  Nutrition Plan: Real whole foods    PATIENT INFORMATION PROVIDED:  1.) 24 Week Healthy Lifestyle Plan Overview Handout:    Explained the structure of the 24 week plan    Reviewed scheduling information    Discussed option to do coaching sessions via phone or in person  2.) Cooking and Exercise Class Handout  3.) Support Group Handout  4.) Explain the role of a HEALTH  and the FOUR Pillars of Health    INITIAL INTAKE: will complete this at visit #2  The four pillars of well-being may impact your ability to manage weight.   Level of Satisfaction:   Rate your current level of satisfaction on a scale of 1-10 in each pillar.     Sleep (1 -10):     Movement/Activity (1 -10):     Nutrition (1 -10):     Stress Management (1 -10):     WELLNESS VISION: 5 minute Visioning Exercise - will complete at visit #2    You may choose to close your eyes for this exercise. Start with three full breaths to bring your attention inward.    In your mind s eye, see yourself in the near future. You are your healthiest, happiest, and most true version of yourself. What do you see? What do you notice?     Invite patient to expand on this vision, and/or start  trying on  this vision this week.    NOTES:  Attila and his wife Angeli moved to [facility] 1.5 years ago.  2 sons:  18 and 16.  1 attends [employer].  He is a  and also /therapist experience in the  "past.  Shared that he has had success with losing weight thru a variety of methods:  Cross fit style in [place], working with a physician and dietitian in [place] (mostly drank protein shakes), at this time has put some weight back on and would like to find some motivation (as it's low currently) to finally get healthy.  Labs all look good but losing some weight will allow for Attila to feel good in his body.  Also looking forward to increasing his self-esteem/confidence.  Shared three important things:  1. Knowledge is there with regards to food, what is recommended. Application just isn't present, especially finding himself hungry (emotional connection) in the later evenings.  2. Stress - some anxiety in the past and maybe some depression (undiagnosed?)  3. Physically active always but today is finding it more difficult to bounce back and/or just go do.  He is dealing with some back issues and numbness currently working with MARY Pineda at [facility].  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  Harder to be active now with the back and works a very sedentary job.    Goals/intentions for the next couple weeks:  1. Looking forward to moving his body these next couple weeks.  2. May start using the journal, including tracking some food as well as checking in with himself and how his day(s) is.  Stress levels, how his energy is, etc.  Approach using the journal with a sense of curiosity rather than any \"shoulds\" or expectations.  Simply allow himself to feel what he is feeling.    July 2020:  Trip planned to [facility] with his wife Angeli and another couple they have been friends with for a long time.  They are celebrating 25 years of marriage this year.    FOLLOW-Up:  To schedule your next visit, please call [phone].  TIME:  20 minutes spent with patient, 10 minutes charting.     SIGNATURE:  ALMA Bird, [facility]  National Board Certified Health & Wellness "   24 Week Healthy Lifestyle Program Health   [facility]  [email or web address]

## 2021-06-06 NOTE — PROGRESS NOTES
Follow up 24 Week Lifestyle Program Nutrition Note    Assessment:  Pt is being seen today for follow up 24 Week Lifestyle Program nutritional evaluation. This is the patient's 2nd dietitian visit of the program.   Start Date: 1/23/2020  Graduation Date: 7/9/2020  Health Coaching Visits Completed: 1  Cooking Classes: No  Exercise Class: No    Use of meal replacements: Yes  Number of meal replacements being used per day: Bars for snacks, Shakes for meals (here and there)   Calorie Level: 1606-8693    Weight Loss Medication: Diethylpropion, Metformin      Pt Active Problem List Diagnosis:  Patient Active Problem List   Diagnosis     Prediabetes     Class 3 severe obesity due to excess calories with serious comorbidity and body mass index (BMI) of 45.0 to 49.9 in adult (H)     Benign essential hypertension     Snores     Metabolic syndrome     Avitaminosis D     Swelling     Acute non-recurrent sinusitis, unspecified location     Rupture of plantar fascia of right foot     Peroneal tendonitis of right lower extremity     Family history of colon cancer     Cutaneous abscess of groin       Pt's Initial Weight: 360 lbs  Weight: (!) 346 lb 9.6 oz (157.2 kg)  Weight loss from initial: 13.4  % Weight loss: 3.72 %    BMI: Body mass index is 46.36 kg/m .  IBW: 178-188 lbs    Estimated RMR ([place]-St Jeor equation): 2482 calories  Recommended Protein Intake:  60-80 grams of protein/day     Patient Progress on Goals:  1. Trying to get in more movement, has been working with PT.  Planning ahead in terms of meals.  Trying to be conscious regarding portion sizes, especially in terms of carbohydrates.      Food Recall/History:  Breakfast (around 7:30 am): Shake or Protein Bar or Eggs or Yogurt (Greek)   Snack: none   Lunch: Protein Shake or Protein Bar or Fish, Green Beans, and Salad   Snack: none   Dinner: Pork Tenderloin, Baked Potato, Green Beans or Meatloaf, Mashed Potatoes, and Corn or Hamburger (no bun), Baked Beans or Chicken  with a salad   Snack: fruit     Nutrition Diagnosis (PES statement):  Overweight/Obesity (NC 3.3) related to overeating and poor lifestyle habits as evidenced by patient's subjective statements (such as h/o excessive energy intake especially in the evening due to stress eating) and BMI of 46.36 kg/m2.    Nutrition Intervention:    1. Recommend energy/nutrient modification.  2. Calorie Modification 5141-7624 calories per day    Implementation:  Reviewed progress with previous goals  Nutrition Education: discussed mindful eating strategies, stress/boredom eating and ways to cope, snack options. Also provided patient with Protein Supplement list since patient is really not caring for Bariatrix Protein Shakes.   Discussed use of 24 week journal  Reviewed meal planning    Patient Goals:  1. Put together a list of activities that are enjoyed as options to do instead of stress eating in the evening.     Monitoring and Evaluation:    Follow up with Health  weekly and RD monthly.     Minutes spent with patient: 30    ABN signed: Yes

## 2021-06-06 NOTE — PROGRESS NOTES
[facility] Daily Progress     Patient Name: Attila Rocha  Date: 3/12/2020  Visit #: 4/10  Referring Provider: Holter, Todd  Referring Diagnosis: Acute low back pain  Visit Diagnosis:     ICD-10-CM    1. Acute right-sided low back pain with right-sided sciatica  M54.41    2. Generalized muscle weakness  M62.81    3. Decreased range of motion of trunk and back  M25.60    4. Right leg numbness  R20.0    5. Radicular leg pain  M54.10        Assessment:     Pt demo's decreased trunk ROM today versus last visit - insidious return of R low back pain - pt not sure if lumbar facet injection wearing off since January.  Pt can have increased R foot numbness with seated slump testing but no reproduction of back pain.  Back pain reproduced with R SB ROM - appears facet jt irritation in nature.    Patient is benefitting from skilled physical therapy and is making steady progress toward functional goals.  Patient is appropriate to continue with skilled physical therapy intervention, as indicated by initial plan of care.    Goal Status:  Pt. will demonstrate/verbalize independence in self-management of condition in : 12 weeks - IMPROVING    Pt. will be able to walk : 60 minutes;with less pain;with less difficulty;for household mobility;for community mobility;for work;for exercise/recreation;in 12 weeks - increased back and R hip pain with walking over past week    Patient will stand : 60 minutes;with less pain;with less difficulty;for work;for home chores;in 12 weeks - had been improving but over past 1.5 weeks increased lumbar pain    Pt. will bend: to dress;to clean;to do yard work;with less pain;with less difficulty;for self care;for work;for exercise/recreation;in 12 weeks - had been improved but regressed today with return of R sided back pain    Patient will transfer: sit/stand;supine/sit;for car;for toileting;for in/out of bed;for in/out of chair;with less pain;with less difficulty;in 12 weeks - challenging  today      Plan / Patient Education:     Pt to meet with P/A again next week and possible discuss with spinal surgeon options for pain management.    Continue with initial plan of care. Pt to return to PT in 2 weeks.    Assess response to stretching program and isometrics x2 weeks.  Reassess R SB ROM and perform mobs if still limited.  Progress strength as able - attempt sit to stand, hip ext edge of bed and monster walk.      Subjective:     Pt reports he started having some increased back pain about 1.5 weeks ago with insidious onset.  Back pain rated 7-8/10 with similar area to initial increase in pain.    FROM LAST VISITS  Pt had an OH on 2/14 and feels like this has helped some.  Overall felling like his back is almost back to baseline and R LE sx maybe 30% improved what he has had long term.  Pt feels like he can turn on more numbness in his R foot with being barefoot and nerve glide sideways.    Patient Outcome Measures:  Modified Oswestry Low Back Pain Disablity Questionnaire  in %: 46     Scores range from 0-100%, where a score of 0% represents minimal pain and maximal function. The minimal clinically important difference is a score reduction of 12%. FROM INITIAL    Objective:     R hip flexors 5/5 with some back pain, R quad 5/5, R DF 4/5, B EHL 5/5    Negative slump for back pain - but some increased R foot numbness    Trunk flexion min limited with fingertips to distal shin (was significantly limited with fingertips to distal thighs), ext WNL without pain, R SB mod limited with fingertips to distal thigh and increased R sided back pain, L SB WNL with mild R sided low back pulling    FROM LAST VISITS  Fatigue with R SLR after about 20 reps (was 7-8)    R L4-S1 hypomobile with mild pain     Increased R low back pain with anterior pelvic tight and with hip flexion without TA iso    No increased R foot numbness with R SLR    Treatment Today      TREATMENT MINUTES COMMENTS   Evaluation     Self-care/ Home  management     Manual therapy     Neuromuscular Re-education     Therapeutic Activity     Therapeutic Exercises 26 Discussed response to HEP and activity level since last visit and with insidious return of R sided back pain. Reassessed trunk ROM  and discussed results. Performed, reviewed and modified HEP for decreased loading and continued stretching lumbar spine per pt instructions and printed AVS for home.   Gait training     Modality__________________                Total 26    Blank areas are intentional and mean the treatment did not include these items.       Layne Lorenzo PT, DPT, OCS, CLT  3/12/2020  9:46 AM

## 2021-06-06 NOTE — PROGRESS NOTES
[facility] Daily Progress     Patient Name: Attila Rocha  Date: 2/20/2020  Visit #: 3/10  Referring Provider: Holter, Todd  Referring Diagnosis: Acute low back pain  Visit Diagnosis:     ICD-10-CM    1. Acute right-sided low back pain with right-sided sciatica M54.41    2. Generalized muscle weakness M62.81    3. Decreased range of motion of trunk and back M25.60    4. Right leg numbness R20.0    5. Radicular leg pain M54.10        Assessment:     Pt demo's continued improvement to lumbar flexion and R SB ROM with mild on going R low back pain and continued R LE intermittent numbness.    Patient is benefitting from skilled physical therapy and is making steady progress toward functional goals.  Patient is appropriate to continue with skilled physical therapy intervention, as indicated by initial plan of care.    Goal Status:  Pt. will demonstrate/verbalize independence in self-management of condition in : 12 weeks - IMPROVING    Pt. will be able to walk : 60 minutes;with less pain;with less difficulty;for household mobility;for community mobility;for work;for exercise/recreation;in 12 weeks - IMPROVING    Patient will stand : 60 minutes;with less pain;with less difficulty;for work;for home chores;in 12 weeks - IMPROVING - for back but continued with R leg numbness    Pt. will bend: to dress;to clean;to do yard work;with less pain;with less difficulty;for self care;for work;for exercise/recreation;in 12 weeks - IMPROVING - able to touch toes without pain    Patient will transfer: sit/stand;supine/sit;for car;for toileting;for in/out of bed;for in/out of chair;with less pain;with less difficulty;in 12 weeks - IMPROVING      Plan / Patient Education:     Continue with initial plan of care.    Assess response to R LE nerve glide with adduction and progression to bridge SL or march.  Reassess R SB ROM and perform mobs if still limited.  Progress strength as able - attempt sit to stand, hip ext edge of bed  "and monster walk.      Subjective:     Pain Ratin/10 sitting right now, pain can be up to 7-8/10 with increased stress on R side    Pt reports driving to help his mom out and was able to drive up to 3 hours in a row without difficulty.  Pt had an OH on  and feels like this has helped some.  Overall felling like his back is almost back to baseline and R LE sx maybe 30% improved what he has had long term.  Pt feels like he can turn on more numbness in his R foot with being barefoot and nerve glide sideways.    Patient Outcome Measures:  Modified Oswestry Low Back Pain Disablity Questionnaire  in %: 46     Scores range from 0-100%, where a score of 0% represents minimal pain and maximal function. The minimal clinically important difference is a score reduction of 12%. FROM INITIAL    Objective:     Trunk flexion WNL with fingertips to toes (was significantly limited with fingertips to distal thighs), ext WNL without pain, R SB min limited with fingertips to superior calf with stiffness R low back, L SB WNL without pain    Fatigue with R SLR after about 20 reps (was 7-8)    R L4-S1 hypomobile with mild pain     FROM LAST VISITS  Increased R low back pain with anterior pelvic tight and with hip flexion without TA iso    No increased R foot numbness with R SLR    Treatment Today      TREATMENT MINUTES COMMENTS   Evaluation     Self-care/ Home management     Manual therapy 10 Jt mobs rotational in L s/l and prone P/A transverse process grade III/IV x30\" oscillations   Neuromuscular Re-education     Therapeutic Activity     Therapeutic Exercises 25 Discussed response to HEP and activity level. Reassessed trunk ROM  and discussed results. Performed, reviewed and advanced HEP with verbal cues and explanations for reasoning with exercises per pt instructions and printed AVS for home.   Gait training     Modality__________________                Total 35    Blank areas are intentional and mean the treatment did not " include these items.       Layne Lorenzo PT, DPT, OCS, CLT  2/20/2020  9:46 AM

## 2021-06-06 NOTE — PROGRESS NOTES
"ESTEFANY GARG Reason for Visit: 24-Week Healthy Lifestyle Program Follow up Visit - Health Coaching  Referred by: Johnson Jeffrey  Progress Notes:  Return Weight Management Health Coaching Note  Attila Rocha   MRN: [id number]  : 1970  ADAMARIS: 2020  Health  Visit #: 2  In Person Visit: yes  Start Date:  2020  Graduation Date:  2020  Physician:  Dr. Wang    Cooking Class Series thru [facility]:  choose one:   in March: ,  & 2020 at [facility] (6-8 pm),  in April:  , , 2020 at [facility] (6-8 pm),  in May:  , , 2020 at [facility] (6-8 pm)  Intro to Exercise Class at [facility] in [place]:  choose one:   at 6:45 pm:  , , May 18, or Whitley 15 2020    ASSESSMENT:  Initial Weight: 360 lbs  Current Weight (lbs): 344.4 lbs.  Average Daily Water (ounces): 24-36 oz./day  Weight Loss Medication: Diethylpropion and Metformin  Nutrition Plan: Real whole foods    Level of Satisfaction:   Rate your current level of satisfaction on a scale of 1-10 in each pillar.      Nutrition (1 -10): 6    Movement/Activity (1 -10): 3 (dealing with back and leg issues/pain - working with PT)    Sleep (1 -10): 7 (generally getting between 7-8 hours, very consistent)    Stress Management (1 -10): 4 - finding ways/things to help \"turn off the mind\" works to manage stress.  (example:  Watch a movie)    PREVIOUS GOAL(S) REVIEW:  1. Looking forward to moving his body these next couple weeks.  2. May start using the journal, including tracking some food as well as checking in with himself and how his day(s) is.  Stress levels, how his energy is, etc.  Approach using the journal with a sense of curiosity rather than any \"shoulds\" or expectations.  Simply allow himself to feel what he is feeling.     2020:  Trip planned to [facility] with his wife Angeli and another couple they have been friends with " "for a long time. They are celebrating 25 years of marriage this year.    PILLAR(S) DISCUSSED IN THIS VISIT:  Talked about checking in/assessing where he is with the four pillars today.  Also discussed and defined what \"health\" and \"happy\" means to him.  This led into him describing what his optimal health and wellbeing (vision) might include:  Here are some of his thoughts:  Health:  Physical and being able to do what I want, when I want. Being able to move through each day painfree and without hesitation of choosing activity.  Taking care of yourself through movement, nutrition, emotional: not stuffing it down but being able to deal and process thru conflict.  Also to not overanalyze or make more of a deal of something than it is.  Enjoying close relationships with family, friends, people.    Happy:  Having things in life to enjoy, people around that enjoy same types of activities and share similar interests.  Happy feels in my body:  Excitement, joyful and may enjoy a smile.  Ease.  It can be momentary, not always but to have happy moments to experience add to overall health and wellbeing through feelings of goodness.    GOALS:   Exercise/Movement: activity is most important to Attila over the next week even with his physically pain/discomfort he is experiencing recently and working with PT to help treat.  Nutrition: grocery store to stock up for weekend & continue with current routine of him cooking a bit more than his wife mostly due to work schedules    PATIENT EDUCATION PROVIDED:   Gave Attila a book that I had an extra copy of:  Daring Greatly by Matthew Garcia.  He enjoys reading and was interested in any rec I may have.    \"Gifts of Imperfection\"  \"Progress Not Perfection\"    NOTES:  Attila and his wife Angeli moved to [facility] 1.5 years ago.  2 sons:  18 and 16.  1 attends [employer].  He is a  and also /therapist experience in the past.  Shared that he has had success with " losing weight thru a variety of methods:  Cross fit style in [place], working with a physician and dietitian in [place] (mostly drank protein shakes), at this time has put some weight back on and would like to find some motivation (as it's low currently) to finally get healthy.  Labs all look good but losing some weight will allow for Attila to feel good in his body.  Also looking forward to increasing his self-esteem/confidence.  Shared three important things:  1. Knowledge is there with regards to food, what is recommended. Application just isn't present, especially finding himself hungry (emotional connection) in the later evenings.  2. Stress - some anxiety in the past and maybe some depression (undiagnosed?)  3. Physically active always but today is finding it more difficult to bounce back and/or just go do.  He is dealing with some back issues and numbness currently working with MARY Pineda at [facility].  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  Harder to be active now with the back and works a very sedentary job.    FOLLOW-Up:  Scheduled thru early April 2020.    TIME:  20 minutes spent with patient, 10 minutes charting time.     SIGNATURE:  ALMA Bird, [facility]  National Board Certified Health &   24 Week Healthy Lifestyle Program Health   [facility]  [email or web address]

## 2021-06-07 NOTE — TELEPHONE ENCOUNTER
Central PA team  [phone]  Pool: HE PA MED ([id number])          PA has been initiated.       PA form completed and faxed insurance via Cover My Meds     Key:  [id number]     Medication:  Saxenda 18MG/3ML pen-injectors    Insurance:  [employer]        Response will be received via fax and may take up to 5-10 business days depending on plan

## 2021-06-07 NOTE — TELEPHONE ENCOUNTER
We discussed taper of diethylpropion and titration of liraglutide.  Follow-up in clinic 4 weeks after starting liraglutide.

## 2021-06-07 NOTE — PROGRESS NOTES
ESTEFANY GARG Reason for Visit: 24-Week Healthy Lifestyle Program Follow up Visit - Health Coaching  Referred by: Johnson Jeffrey  Progress Notes:  Return Weight Management Health Coaching Note  Attila Rocha   MRN: [id number]  : 1970  ADAMARIS: 04/10/2020  Health  Visit #: 3  Telephone Visit: yes  Start Date:  2020  Graduation Date:  2020  Physician:  Dr. Wang    Cooking Class Series thru [facility] (Zoom virtual class options in April & May):     in April:  , , 2020 (6-8 pm) or  in May:  , , 2020 (6-8 pm)    Intro to Exercise Class at [facility] (Zoom virtual class option in April & May):  Monday, , 4:45-5:45pm, Monday, May 18, 4:45-5:45pm or  Whitley 15 2020, 6:45-7:45pm in person at [facility]    ASSESSMENT:  Initial Weight: 360 lbs  Current Weight (lbs): 346 lbs  Average Daily Water (ounces): 24-36 oz/day (recently this seems to be increasing)  Weight Loss Medication: diethylpropion and Metformin (discussed with Dr. Wang on  for some changes, depending on cost)  Nutrition Plan: Real whole foods    PREVIOUS GOAL(S) REVIEW:  Exercise/Movement: activity is most important to Attila over the next week even with his physically pain/discomfort he is experiencing recently and working with PT to help treat. - this is going pretty well.  Went for a 1.5-2 mile walk with a neighbor/friend and felt really good.  His back is doing well.  Miriam (PT) is calling to check in next week with him.  Nutrition: grocery store to stock up for weekend & continue with current routine of him cooking a bit more than his wife mostly due to work schedules - his wife is now cooking more than he is.  Every one is home and Attila is finding himself working more hours than he was before.  All is good. Feeling like he is doing pretty well in his food choices at this time.    *Note:  Noticing though that dairy/milk doesn't leave  "him feeling well after he eats/drinks it.  Paying attention to this moving forward and perhaps reducing/eliminating dairy from his diet.    PILLAR(S) DISCUSSED IN THIS VISIT:  Sleep: been staying up a little later with family watching movies for example.  He feels better if he gets to bed earlier as he feels most productive in the mornings.  Exercise/Movement: got a new, more comfortable bike yesterday (4/9/20).  Looking forward to biking and enjoying this spring weather coming.  He enjoys working out and sweating.  Nutrition: wife is doing more of the cooking now.  All family is home.  Decent choices each day. Mindful.  Stress Management: feeling ok in this area.  His family has moved a lot so even with this uncertainty of Covid-19 and change of lifestyle/routine, he can somewhat say that it is familiar to him and his family.  The uncertainty.  Not that it is preferred but they have been through a lot of uncertain times through their moves and have made it through.  Resilience is a strength.  Other:  Son's football recruitment for college is impacted with Covid-19.    Cancelled summer trip to [facility] that they were planning with another good friend couple.    GOALS:   Sleep: aware of bedtime, not staying up too late. (impacts next day)  Exercise/Movement: walking and biking as body responds well.  Some concern about re-injuring or causing any type of flair up in his back etc.  Listen to body.  Other: down 3-4 pounds in the next couple weeks.    PATIENT EDUCATION PROVIDED:   Gave Attila a book that I had an extra copy of:  Daring Greatly by Matthew Garcia.  He enjoys reading and was interested in any rec I may have.    \"Gifts of Imperfection\"  \"Progress Not Perfection\"     NOTES:  Attila and his wife Angeli moved to [facility] 1.5 years ago.  2 sons:  18 and 16.  1 attends [employer].  He is a  and also /therapist experience in the past.  Shared that he has had success with losing " weight thru a variety of methods:  Cross fit style in [place], working with a physician and dietitian in [place] (mostly drank protein shakes), at this time has put some weight back on and would like to find some motivation (as it's low currently) to finally get healthy.  Labs all look good but losing some weight will allow for Attila to feel good in his body.  Also looking forward to increasing his self-esteem/confidence.  Shared three important things:  1. Knowledge is there with regards to food, what is recommended. Application just isn't present, especially finding himself hungry (emotional connection) in the later evenings.  2. Stress - some anxiety in the past and maybe some depression (undiagnosed?)  3. Physically active always but today is finding it more difficult to bounce back and/or just go do.  He is dealing with some back issues and numbness currently working with MARY Pineda at [facility].  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  Harder to be active now with the back and works a very sedentary job.    FOLLOW-Up:  April 24 @8:30 am HC #4 (choosing to go every other week)    TIME:  20 minutes spent with patient, 10 minutes charting time.     SIGNATURE:  ALMA Bird, [facility]  National Board Certified Health &   24 Week Healthy Lifestyle Program Health   [facility]  [email or web address]

## 2021-06-07 NOTE — TELEPHONE ENCOUNTER
RN cannot approve Refill Request    RN can NOT refill this medication med is not covered by policy/route to provider     . Last office visit: 10/4/2019 Katlin Padron MD Last Physical: Visit date not found Last MTM visit: Visit date not found Last visit same specialty: 1/23/2020 Johnson Jeffrey MD.  Next visit within 3 mo: Visit date not found  Next physical within 3 mo: Visit date not found      Sia Rodriguez, [facility] Connection Triage/Med Refill 3/18/2020    Requested Prescriptions   Pending Prescriptions Disp Refills     cefdinir (OMNICEF) 300 MG capsule [Pharmacy Med Name: Cefdinir Oral Capsule 300 MG] 20 capsule 0     Sig: Take 1 capsule (300 mg total) by mouth 2 (two) times a day for 10 days.       There is no refill protocol information for this order

## 2021-06-07 NOTE — PROGRESS NOTES
"Attila Rocha is a 49 y.o. male who is being evaluated via a billable video visit.      The patient has been notified of following:     \"This video visit will be conducted via a call between you and your physician/provider. We have found that certain health care needs can be provided without the need for an in-person physical exam.  This service lets us provide the care you need with a video conversation.  If a prescription is necessary we can send it directly to your pharmacy.  If lab work is needed we can place an order for that and you can then stop by our lab to have the test done at a later time.    Video visits are billed at different rates depending on your insurance coverage. Please reach out to your insurance provider with any questions.    If during the course of the call the physician/provider feels a video visit is not appropriate, you will not be charged for this service.\"    Patient has given verbal consent to a Video visit? Yes    Patient would like the video invitation sent by: Text to cell phone: [phone]    Video Start Time: 1:01 PM    Attila Rocha complains of    Chief Complaint   Patient presents with     Weight Loss     346lbs     Questions For Providers/results     pt would like to discuss weight loss medications        I have reviewed and updated the patient's Past Medical History, Social History, Family History and Medication List.    ALLERGIES  Penicillins    Additional provider notes:      Assessment and Plan:     Class 3 severe obesity due to excess calories with serious comorbidity and body mass index (BMI) of 45.0 to 49.9 in adult (H)  49 y.o. year old male in clinic today to discuss treatment of the following conditions through diet and lifestyle modification and weight loss:  1. Class 3 severe obesity due to excess calories with serious comorbidity and body mass index (BMI) of 45.0 to 49.9 in adult (H)    2. Prediabetes    3. Metabolic syndrome    4. Benign essential " "hypertension    5. Snores      The patient's weight loss result since the last visit was mixed based on weight stability.  The patient feels as though his current effort is basically plateaued and is somewhat frustrated.  Back pain is improved with a recent burst of steroids.  He is not sure that diethylpropion is particularly helpful at this time.  -We discussed in details the pharmaceutical options.  Ultimately, we will see if his insurance covers liraglutide.  Initially, he lost some weight with this medication but over the past 6-12 months his weight has been steadily increasing despite use of anorexigenic medications.  No personal history of medullary thyroid cancer.  No family history of medullary thyroid cancer.  No personal history of pancreatitis.  - Continue 24-week comprehensive weight management program.  - Bariatric surgery is not covered by his insurance over the patient would be interested in continuing this conversation if it were not cost prohibitive.  - Follow-up in 1 month, sooner as needed.      Continue supplements.    Recommend increasing movement throughout the day--sitting less, moving more.  Will increase activity over time to reach a goal of at least 150 minutes of moderate exercise each week.  Behavior modification:  Cognitive restructuring exercises, journaling stressors, triggers for food cravings.  Dietary Intervention:  Reduced calorie, reduced carbohydrates, whole food diet.  Greater than 50% of this 25 minute visit was spent in counseling regarding patient's obesity, medications, dietary, exercise, and behavior modification recommendations.    Subjective  Patient presents for treatment of chronic, comorbid conditions using intensive therapeutic lifestyle interventions including nutrition, physical activity, and behavior management.   - successes: \"Interested in a different approach.\" He has continued to work with the 24 week program.    - struggles: back pain has continued.  It has " been a little bit better.  He was on steroids about a month ago.    - exercise plan: improved with back pain improvement.  Still struggling to be very active.   - medication: benefits: unsure. side effects: none    No flowsheet data found.    Patient Active Problem List   Diagnosis     Prediabetes     Class 3 severe obesity due to excess calories with serious comorbidity and body mass index (BMI) of 45.0 to 49.9 in adult (H)     Benign essential hypertension     Snores     Metabolic syndrome     Avitaminosis D     Swelling     Acute non-recurrent sinusitis, unspecified location     Rupture of plantar fascia of right foot     Peroneal tendonitis of right lower extremity     Family history of colon cancer     Cutaneous abscess of groin       Current Outpatient Medications on File Prior to Visit   Medication Sig Dispense Refill     cholecalciferol, vitamin D3, (VITAMIN D3 ORAL) Take by mouth.       diethylpropion 75 mg TbER Take 75 mg by mouth daily. 90 each 0     fluticasone propionate (FLONASE) 50 mcg/actuation nasal spray 2 sprays into each nostril daily. 16 g 5     hydroCHLOROthiazide (MICROZIDE) 12.5 mg capsule 1 po qd 90 capsule 1     metFORMIN (GLUCOPHAGE XR) 500 MG 24 hr tablet Take 2 tablets (1,000 mg total) by mouth daily with supper. 180 tablet 1     valsartan-hydrochlorothiazide (DIOVAN-HCT) 160-25 mg per tablet Take 1 tablet every day by oral route. 90 tablet 1     [DISCONTINUED] gabapentin (NEURONTIN) 300 MG capsule 1 tablet in the morning 1 in the afternoon and 1-3 tablets at nighttime 90 capsule 0     No current facility-administered medications on file prior to visit.        Objective:  There were no vitals filed for this visit.  No data recorded  There is no height or weight on file to calculate BMI.  No LMP for male patient.  Video visit.   General: No acute distress  Skin: No rashes or lesions noted on the face and hands.  Anicteric.  Respiratory: Non-labored breathing.  Psych: Normal affect.   Normal rate of speech.  No tangentiality.  Thinking is logical.  Neurologic: Cranial nerves II through XII grossly intact.      This note has been dictated using voice recognition software. Any grammatical or context distortions are unintentional and inherent to the software      Video-Visit Details    Type of service:  Video Visit    Video End Time (time video stopped): 1:25 PM    Originating Location (pt. Location): Home    Distant Location (provider location):  [facility] FAMILY MEDICINE/OB     Mode of Communication:  Video Conference via [facility]

## 2021-06-07 NOTE — TELEPHONE ENCOUNTER
This appears to be a refill request following a sinus infection on 10/4.  Please schedule telephone visit to discuss symptoms.

## 2021-06-08 NOTE — TELEPHONE ENCOUNTER
Left message to call back for: Attila  Information to relay to patient:  See message below and schedule an appointment, last seen ( 05/29/2020)               Schedule 2 month follow-up          Rashmi Brown LPN

## 2021-06-08 NOTE — TELEPHONE ENCOUNTER
New Appointment Needed  What is the reason for the visit:    2 month F/U Weight loss  Provider Preference: PCP only  How soon do you need to be seen?: end of July per PCP - scheduling unavailable to writer  Waitlist offered?: No  Okay to leave a detailed message:  Yes

## 2021-06-08 NOTE — PROGRESS NOTES
"Attila Rocha is a 49 y.o. male who is being evaluated via a billable video visit.      The patient has been notified of following:     \"This video visit will be conducted via a call between you and your physician/provider. We have found that certain health care needs can be provided without the need for an in-person physical exam.  This service lets us provide the care you need with a video conversation.  If a prescription is necessary we can send it directly to your pharmacy.  If lab work is needed we can place an order for that and you can then stop by our lab to have the test done at a later time.    Video visits are billed at different rates depending on your insurance coverage. Please reach out to your insurance provider with any questions.    If during the course of the call the physician/provider feels a video visit is not appropriate, you will not be charged for this service.\"    Patient has given verbal consent to a Video visit? Yes    Patient would like to receive their AVS by AVS Preference: Ellen.    Patient would like the video invitation sent by: Text to cell phone: [phone] ([employer])    Will anyone else be joining your video visit? No    Video Start Time: 9:47 AM    Additional provider notes:     Assessment and Plan:     Class 3 severe obesity due to excess calories with serious comorbidity and body mass index (BMI) of 45.0 to 49.9 in adult (H)  49 y.o. year old male in clinic today to discuss treatment of the following conditions through diet and lifestyle modification and weight loss:  1. Class 3 severe obesity due to excess calories with serious comorbidity and body mass index (BMI) of 45.0 to 49.9 in adult (H)    2. Prediabetes    3. Metabolic syndrome    4. Benign essential hypertension    5. Snores      The patient's weight loss result since the last visit was successful based on weight loss.  He has been tolerant of saxenda.   Less night eating.  He states that it is easier to " Dear Rand Mckeon  On behalf of my care team, I would like to thank you for entrusting us with your care today.  Your Rheumatology Team    [facility]  Office Hours: Monday-Friday 8:00 am-4:45 pm,     I am out of the office Friday afternoon   Reach us by phone at [phone] during office hours.    MD Alesha Gutiérrez, BSN, RN  To, ABDIFATAHN, RN  [facility]    If you feel that there is an urgent situation please go to the emergency.    Laboratory Testing:   Most of the important results that help me make treatment decisions take a few days to come back. Most of the abnormal results for arthritis and autoimmune disease rarely require urgent action.  For x-ray results I also wait for the radiologist to review the films before giving my final conclusion.     If you have portal access to [facility] you will get the results within 5-7 days.   Once you have had your blood drawn, please allow 10-15 days to receive your results by mail. (There are specialty labs that have to be sent outside of our facility and can take longer for our office to receive results.)  If your results are normal you may receive a letter in the mail.  If your results are abnormal that require a change you will receive a call from the office.  If you were referred to a Specialist for further treatment:   Please allow 1-5 days to be contacted by that Specialty office.    If you are requesting prescription refills:   Please contact your pharmacy for all refills.   We cannot refill pain medication after hours. You will be asked to see pain management if narcotic prescriptions that are to be refilled every month are needed.      Plan:   1.    Labs:    Followup:         "execute his plan.   - continue saxenda   - now off diethylpropion   - continue metformin   - continue to add exercise   - follow-up in 6-8 weeks.    Continue supplements.    Recommend increasing movement throughout the day--sitting less, moving more.  Will increase activity over time to reach a goal of at least 150 minutes of moderate exercise each week.  Behavior modification:  Cognitive restructuring exercises, journaling stressors, triggers for food cravings.  Dietary Intervention:  Reduced calorie, reduced carbohydrates, whole food diet.  Greater than 50% of this 11 minute visit was spent in counseling regarding patient's obesity, medications, dietary, exercise, and behavior modification recommendations.    Subjective  Patient presents for treatment of chronic, comorbid conditions using intensive therapeutic lifestyle interventions including nutrition, physical activity, and behavior management.   - successes: new to saxenda since I saw him in early April.  Weight is down to 337 lbs by home scale.  He has notices that it impacts appetite. He is now taking 3.0 mg/day.  Initially he experienced acid reflux and nausea (worse with certain foods).  He has been focusing on not eating late at night.  \"Doing better with that.\"   - back has been doing better.   - he has been using NOOM.   - exercise plan: biking, walking, golfing.   - tracking/journaling: yes   - following nutritional plan: yes.  Deviations from plan: infrequent   - hunger: controlled.     - medication: benefits: appetite suppression. side effects: none   - he stopped diethylpropion.    No flowsheet data found.    Patient Active Problem List   Diagnosis     Prediabetes     Class 3 severe obesity due to excess calories with serious comorbidity and body mass index (BMI) of 45.0 to 49.9 in adult (H)     Benign essential hypertension     Snores     Metabolic syndrome     Avitaminosis D     Swelling     Acute non-recurrent sinusitis, unspecified location     " "Rupture of plantar fascia of right foot     Peroneal tendonitis of right lower extremity     Family history of colon cancer     Cutaneous abscess of groin       Current Outpatient Medications on File Prior to Visit   Medication Sig Dispense Refill     cholecalciferol, vitamin D3, (VITAMIN D3 ORAL) Take by mouth.       fluticasone propionate (FLONASE) 50 mcg/actuation nasal spray 2 sprays into each nostril daily. 16 g 5     hydroCHLOROthiazide (MICROZIDE) 12.5 mg capsule 1 po qd 90 capsule 1     liraglutide, weight loss, 3 mg/0.5 mL (18 mg/3 mL) PnIj Inject 3 mg under the skin daily. 15 mL 1     metFORMIN (GLUCOPHAGE XR) 500 MG 24 hr tablet Take 2 tablets (1,000 mg total) by mouth daily with supper. 180 tablet 1     pen needle, diabetic (BD ULTRA-FINE PATRICIO PEN NEEDLE) 32 gauge x 5/32\" Ndle Use once daily as directed 30 each 1     valsartan-hydrochlorothiazide (DIOVAN-HCT) 160-25 mg per tablet Take 1 tablet every day by oral route. 90 tablet 1     [DISCONTINUED] diethylpropion 75 mg TbER Take 75 mg by mouth daily. 90 each 0     No current facility-administered medications on file prior to visit.        Objective:  There were no vitals filed for this visit.  Weight: (!) 337 lb (152.9 kg) (Pt Reported)    Body mass index is 45.08 kg/m .  No LMP for male patient.  GENERAL: Healthy, alert and no distress  EYES: Eyes grossly normal to inspection. No discharge or erythema, or obvious scleral/conjunctival abnormalities.  RESP: No audible wheeze, cough, or visible cyanosis.  No visible retractions or increased work of breathing.    NEURO: Cranial nerves grossly intact. Mentation and speech appropriate for age.  PSYCH: Mentation appears normal, affect normal/bright, judgement and insight intact, normal speech and appearance well-groomed        This note has been dictated using voice recognition software. Any grammatical or context distortions are unintentional and inherent to the software      Video-Visit Details    Type of " service:  Video Visit    Video End Time (time video stopped): 9:58 AM  Originating Location (pt. Location): Home    Distant Location (provider location):  [facility] FAMILY MEDICINE/OB     Platform used for Video Visit: Mary Jeffrey MD

## 2021-06-08 NOTE — TELEPHONE ENCOUNTER
Left message to call back for: Attila  Information to relay to patient:  Scheduled him for July 28th at 8 am. He is to call back if this doesn't work. Or send a ChatStat message

## 2021-06-08 NOTE — TELEPHONE ENCOUNTER
Central PA team  [phone]  Pool: HE PA MED ([id number])          PA has been initiated.       PA form completed and faxed insurance via Cover My Meds     Key:   [id number]     Medication: Saxenda 18MG/3ML pen-injectors    Insurance:  [employer]         Response will be received via fax and may take up to 5-10 business days depending on plan

## 2021-06-08 NOTE — TELEPHONE ENCOUNTER
Provider Refill Request  Medication:  santino  RN can NOT refill this medication per RN refill protocol because med is not covered by policy/route to provider

## 2021-06-08 NOTE — TELEPHONE ENCOUNTER
Left message to call back for: LM for patient to call back and schedule a two month follow up  Information to relay to patient:  Please see notes below and schedule upon recall

## 2021-06-08 NOTE — TELEPHONE ENCOUNTER
Prior Authorization Request  Who s requesting:  Pharmacy  Pharmacy Name and Location: [facility] #[id number] [place] [address] [address]   Medication Name: liraglutide, weight loss, 3 mg/0.5 mL (18 mg/3 mL) PnIj     Insurance Plan:   Insurance Member ID Number:  [id number]                                                6or8  CoverMyMeds Key: [id number] or S  Informed patient that prior authorizations can take up to 10 business days for response:   NA  Okay to leave a detailed message: Yes

## 2021-06-10 NOTE — PROGRESS NOTES
"Attila Rocha is a 49 y.o. male who is being evaluated via a billable video visit.      The patient has been notified of following:     \"This video visit will be conducted via a call between you and your physician/provider. We have found that certain health care needs can be provided without the need for an in-person physical exam.  This service lets us provide the care you need with a video conversation.  If a prescription is necessary we can send it directly to your pharmacy.  If lab work is needed we can place an order for that and you can then stop by our lab to have the test done at a later time.    Video visits are billed at different rates depending on your insurance coverage. Please reach out to your insurance provider with any questions.    If during the course of the call the physician/provider feels a video visit is not appropriate, you will not be charged for this service.\"    Patient has given verbal consent to a Video visit? Yes  How would you like to obtain your AVS? AVS Preference: MyChart.  If dropped by the video visit, the video invitation should be sent to: Text to cell phone: [phone]  Will anyone else be joining your video visit? No    Video Start Time: 8:07 AM    Additional provider notes:     Assessment and Plan:     Class 3 severe obesity due to excess calories with serious comorbidity and body mass index (BMI) of 45.0 to 49.9 in adult (H)  49 y.o. year old male in clinic today to discuss treatment of the following conditions through diet and lifestyle modification and weight loss:  1. Class 3 severe obesity due to excess calories with serious comorbidity and body mass index (BMI) of 45.0 to 49.9 in adult (H)    2. Prediabetes    3. Benign essential hypertension    4. Snores      The patient's weight loss result since the last visit was successful based on weight loss.  Some mild side effects that went down with dose adjustment. Portions have decreased since starting liraglutide.  Now " ">10% weight loss.     - continue metformin. Decrease to 500 mg / day   - continue liraglutide.  Current dose 2.4 mg.   - continue to be more active.    - adjust BP meds at next visit?    Follow-up: 2 months        Continue supplements.    Recommend increasing movement throughout the day--sitting less, moving more.  Will increase activity over time to reach a goal of at least 150 minutes of moderate exercise each week.  Behavior modification:  Cognitive restructuring exercises, journaling stressors, triggers for food cravings.  Dietary Intervention:  Reduced calorie, reduced carbohydrates, whole food diet.  Greater than 50% of this 15 minute visit was spent in counseling regarding patient's obesity, medications, dietary, exercise, and behavior modification recommendations.    Subjective  Patient presents for treatment of chronic, comorbid conditions using intensive therapeutic lifestyle interventions including nutrition, physical activity, and behavior management.   - successes: COVID test last month was negative.  \"I feel a lot better.\" More energy.  Clothes are more comfortable.  Excited about being able to be more active.    - struggles: stomach upset improved with decreased dose of saxenda.  \"quesy and ingestion.\"  Pepcid AC has helped.  He has learned to avoid spicy goods.  Lots of driving recently.    - tracking/journaling: using [employer] galina.     - following nutritional plan: yes.  Deviations from plan: travel calories   - hunger: stable.  Controlled.   - medication: benefits: helpful. side effects: as above   - BP: no lightheadedness.  No dizziness.     - BM: initially     No flowsheet data found.    Patient Active Problem List   Diagnosis     Prediabetes     Class 3 severe obesity due to excess calories with serious comorbidity and body mass index (BMI) of 45.0 to 49.9 in adult (H)     Benign essential hypertension     Snores     Metabolic syndrome     Avitaminosis D     Swelling     Acute non-recurrent " "sinusitis, unspecified location     Rupture of plantar fascia of right foot     Peroneal tendonitis of right lower extremity     Family history of colon cancer     Cutaneous abscess of groin       Current Outpatient Medications on File Prior to Visit   Medication Sig Dispense Refill     cholecalciferol, vitamin D3, (VITAMIN D3 ORAL) Take by mouth. 5000units per day       fluticasone propionate (FLONASE) 50 mcg/actuation nasal spray 2 sprays into each nostril daily. 16 g 5     hydroCHLOROthiazide (MICROZIDE) 12.5 mg capsule 1 po qd 90 capsule 1     liraglutide, weight loss, 3 mg/0.5 mL (18 mg/3 mL) PnIj Inject 3 mg under the skin daily. (Patient taking differently: Inject 2.4 mg under the skin daily. ) 15 mL 5     pen needle, diabetic (BD ULTRA-FINE PATRICIO PEN NEEDLE) 32 gauge x 5/32\" Ndle Use once daily as directed 100 each 1     valsartan-hydrochlorothiazide (DIOVAN-HCT) 160-25 mg per tablet Take 1 tablet every day by oral route. 90 tablet 0     [DISCONTINUED] metFORMIN (GLUCOPHAGE XR) 500 MG 24 hr tablet Take 2 tablets (1,000 mg total) by mouth daily with supper. 180 tablet 1     No current facility-administered medications on file prior to visit.        Objective:  There were no vitals filed for this visit.  Initial Weight: 360 lbs  Weight: (!) 323 lb (146.5 kg)  Weight loss from initial: 37  % Weight loss: 10.28 %    Body mass index is 43.2 kg/m .  No LMP for male patient.  GENERAL: Healthy, alert and no distress  EYES: Eyes grossly normal to inspection. No discharge or erythema, or obvious scleral/conjunctival abnormalities.  RESP: No audible wheeze, cough, or visible cyanosis.  No visible retractions or increased work of breathing.    SKIN: Visible skin clear. No significant rash, abnormal pigmentation or lesions.  NEURO: Cranial nerves grossly intact. Mentation and speech appropriate for age.  PSYCH: Mentation appears normal, affect normal/bright, judgement and insight intact, normal speech and appearance " well-groomed    This note has been dictated using voice recognition software. Any grammatical or context distortions are unintentional and inherent to the software    Video-Visit Details    Type of service:  Video Visit    Video End Time (time video stopped): 8:23 AM  Originating Location (pt. Location): Home    Distant Location (provider location):  [facility] FAMILY MEDICINE/OB     Platform used for Video Visit: Ilda Jeffrey MD

## 2021-06-11 NOTE — PROGRESS NOTES
"Attila Rocha is a 50 y.o. male who is being evaluated via a billable video visit.      The patient has been notified of following:     \"This video visit will be conducted via a call between you and your physician/provider. We have found that certain health care needs can be provided without the need for an in-person physical exam.  This service lets us provide the care you need with a video conversation.  If a prescription is necessary we can send it directly to your pharmacy.  If lab work is needed we can place an order for that and you can then stop by our lab to have the test done at a later time.    Video visits are billed at different rates depending on your insurance coverage. Please reach out to your insurance provider with any questions.    If during the course of the call the physician/provider feels a video visit is not appropriate, you will not be charged for this service.\"    Patient has given verbal consent to a Video visit? Yes  How would you like to obtain your AVS? AVS Preference: MyChart.  If dropped by the video visit, the video invitation should be sent to: Text to cell phone: [phone]  Will anyone else be joining your video visit? No    Video Start Time: 9:15 AM    Additional provider notes:     Assessment and Plan:     Class 3 severe obesity due to excess calories with serious comorbidity and body mass index (BMI) of 40.0 to 44.9 in adult (H)  50 y.o. year old male in clinic today to discuss treatment of the following conditions through diet and lifestyle modification and weight loss:  No diagnosis found.  The patient's weight loss result since the last visit was successful based on sense of feeling healthy and strong.  Physical activity has increased in quantity and quality.    - continue metformin   - continue liraglutide: increase back to 3.0 mg   - success definition: back not hurting, labs.      - follow-up in three months with annual exam.       Continue supplements.    Recommend " "increasing movement throughout the day--sitting less, moving more.  Will increase activity over time to reach a goal of at least 150 minutes of moderate exercise each week.  Behavior modification:  Cognitive restructuring exercises, journaling stressors, triggers for food cravings.  Dietary Intervention:  Reduced calorie, reduced carbohydrates, whole food diet.  Greater than 50% of this 12 minute visit was spent in counseling regarding patient's obesity, medications, dietary, exercise, and behavior modification recommendations.    Subjective  Patient presents for treatment of chronic, comorbid conditions using intensive therapeutic lifestyle interventions including nutrition, physical activity, and behavior management.   - successes/successes: concerned about plateau    - exercise plan: increased,  Walking more.  7 miles the other day.  Biking.     - tracking/journaling: ad zach.  He continues to use the [employer] galina.  He says that it became cumbersome.  Less helpful as his journey progressed.  No recent changes from nutrition.  Not eating at night.     - following nutritional plan: yes.  Deviations from plan: infrequent   - hunger: \"not grazing as much.\"      - medication side effects: stomach issues better at lower dose    No flowsheet data found.    Patient Active Problem List   Diagnosis     Prediabetes     Class 3 severe obesity due to excess calories with serious comorbidity and body mass index (BMI) of 40.0 to 44.9 in adult (H)     Benign essential hypertension     Snores     Metabolic syndrome     Avitaminosis D     Swelling     Acute non-recurrent sinusitis, unspecified location     Rupture of plantar fascia of right foot     Peroneal tendonitis of right lower extremity     Family history of colon cancer     Cutaneous abscess of groin       Current Outpatient Medications on File Prior to Visit   Medication Sig Dispense Refill     cholecalciferol, vitamin D3, (VITAMIN D3 ORAL) Take by mouth. 5000units per day   " "    ciprofloxacin-dexamethasone (CIPRODEX) otic suspension Administer 4 drops to the right ear 2 (two) times a day. 7.5 mL 0     fluticasone propionate (FLONASE) 50 mcg/actuation nasal spray 2 sprays into each nostril daily. 16 g 5     hydroCHLOROthiazide (MICROZIDE) 12.5 mg capsule 1 po qd 90 capsule 1     HYDROcodone-acetaminophen 5-325 mg per tablet Take 1 tablet by mouth every 8 (eight) hours as needed for pain. 6 tablet 0     liraglutide, weight loss, 3 mg/0.5 mL (18 mg/3 mL) PnIj Inject 3 mg under the skin daily. (Patient taking differently: Inject 2.4 mg under the skin daily. ) 15 mL 5     metFORMIN (GLUCOPHAGE XR) 500 MG 24 hr tablet Take 2 tablets (1,000 mg total) by mouth daily with supper. 180 tablet 1     pen needle, diabetic (BD ULTRA-FINE PATRICIO PEN NEEDLE) 32 gauge x 5/32\" Ndle Use once daily as directed 100 each 1     valsartan-hydrochlorothiazide (DIOVAN-HCT) 160-25 mg per tablet Take 1 tablet every day by oral route. 90 tablet 0     [DISCONTINUED] cefdinir (OMNICEF) 300 MG capsule Take 1 capsule (300 mg total) by mouth 2 (two) times a day for 10 days. 20 capsule 0     No current facility-administered medications on file prior to visit.        Objective:  There were no vitals filed for this visit.  Initial Weight: 360 lbs  Weight: (!) 324 lb (147 kg)  Weight loss from initial: 36  % Weight loss: 10 %    Body mass index is 43.34 kg/m .  No LMP for male patient.  GENERAL: Healthy, alert and no distress  EYES: Eyes grossly normal to inspection. No discharge or erythema, or obvious scleral/conjunctival abnormalities.  RESP: No audible wheeze, cough, or visible cyanosis.  No visible retractions or increased work of breathing.    SKIN: Visible skin clear. No significant rash, abnormal pigmentation or lesions.  NEURO: Cranial nerves grossly intact. Mentation and speech appropriate for age.  PSYCH: Mentation appears normal, affect normal/bright, judgement and insight intact, normal speech and appearance " well-groomed    This note has been dictated using voice recognition software. Any grammatical or context distortions are unintentional and inherent to the software    Video-Visit Details    Type of service:  Video Visit    Video End Time (time video stopped): 9:46 AM  Originating Location (pt. Location): Home    Distant Location (provider location):  [facility]     Platform used for Video Visit: Ilda Jeffrey MD

## 2021-06-11 NOTE — TELEPHONE ENCOUNTER
Called pt, he stated starting at 4pm yesterday the pain became worse, he states he is alternating tylenol and Ibuprofen every 2 hours without relief    Rashmi Brown LPN

## 2021-06-11 NOTE — TELEPHONE ENCOUNTER
Called pharmacy, they did not receive medication, pharmacist states they are having trouble with the computer system. Pt will  at     Rashmi Brown LPN

## 2021-06-11 NOTE — TELEPHONE ENCOUNTER
Patient Returning Call  Reason for call:  Patient called back.  Information relayed to patient:  n/a  Patient has additional questions:  Yes  If YES, what are your questions/concerns:  States he is having increased ear pain and the tylenol and ibuprofen is not working.  Would like a stronger pain med sent to his pharmacy.  Please advise today.  Okay to leave a detailed message?: Yes

## 2021-06-11 NOTE — PROGRESS NOTES
Assessment/Plan:    Attila was seen today for ear pain.    Diagnoses and all orders for this visit:    Benign essential hypertension  Blood pressure reasonably well controlled.  He continues to work on diet/lifestyle modifications.  No side effects or medications.  Check basic metabolic panel.  Continue current therapy without change.    Non-recurrent acute suppurative otitis media of right ear without spontaneous rupture of tympanic membrane: Exam is consistent with mild acute otitis media in the right side.  Mild symptoms of sinus infection.  Given the worsening symptoms over the past several days, I think it is reasonable to treat this with antibiotics.  Mild allergy to penicillins.  Previously tolerant to Ceftin ear.  Cefdinir 300 mg twice daily x10 days prescribed.  -     cefdinir (OMNICEF) 300 MG capsule; Take 1 capsule (300 mg total) by mouth 2 (two) times a day for 10 days.     Return in about 1 week (around 9/25/2020) for recheck if not improving.    Johnson Jeffrey MD  _______________________________    Chief Complaint   Patient presents with     Ear Pain     right x 3 days      Subjective: Attila Rocha is a 50 y.o. year old male who I have seen in clinic before who presents with the following acute complaint(s):    Right ear pain: Starting approximately 3 days ago, this patient's had sharp intermittent pain in his right ear.  Is been progressively worse.  He also has associated mild tenderness of his sinuses.  He has not had ear infections as an adult.  He has had sinus infections as an adult.  No fever.  No chills.  Energy is been relatively normal.  He recently returned from visiting his mother in [place], [place].  Palliative: None.  Provocative: None.  Previously, he is been tolerant of cefdinir (allergy to penicillins includes a rash).    Blood pressure:   -No lightheadedness.  No dizziness.  No known side effects.  No cough.  No fainting.  No falling down.  He continues to lose weight.  He ran  for the first time in years over this past week.  He is doing calisthenics on a daily basis.  Feels strong and well.    ROS: Complete review of systems obtained.  Pertinent items are listed above.     The following portions of the patient's history were reviewed and updated as appropriate: allergies, current medications, past medical history and problem list.     Objective:   /80 (Patient Site: Left Arm, Patient Position: Sitting, Cuff Size: Adult Large)   Pulse 72   Temp 98.9  F (37.2  C) (Oral)   Resp 20   Wt (!) 323 lb (146.5 kg)   SpO2 98% Comment: room air  BMI 43.20 kg/m    General: No acute distress  Ears: The right tympanic membrane is erythematous, in particular in the posterior superior aspect.  No obvious effusion.  The external auditory canal is nonedematous and non-erythematous.  No obvious discharge.  The left tympanic membrane is gray and glistening.  No anterior cervical lymphadenopathy.  No thyromegaly.  Cardiac: Regular rate and rhythm, normal S1/S2, no murmurs of the gallops  Respiratory: Clear to auscultation bilaterally.  Extremities: No swelling at the ankles bilaterally.  Psych: Bright affect.    No results found for this or any previous visit (from the past 24 hour(s)).  No results found.    Additional History from Old Records Summarized (2): no  Decision to Obtain Records (1): no  Radiology Tests Summarized or Ordered (1): no  Labs Reviewed or Ordered (1): yes  Medicine Test Summarized or Ordered (1): no  Independent Review of EKG or X-RAY(2 each): no    This note has been dictated using voice recognition software. Any grammatical or context distortions are unintentional and inherent to the software

## 2021-06-11 NOTE — PROGRESS NOTES
"Assessment/Plan:    Attila was seen today for follow-up and questions for providers/results.    Diagnoses and all orders for this visit:    Infective otitis externa, right: The patient had right-sided ear pain with a relatively normal ear exam.  Given a rim of erythema around the tympanic membrane, we chose to treat for acute otitis media.  I suspect that the diagnosis actually what should have been acute otitis externa.  He will start eardrops.  There is a possibility that this could represent a ruptured eardrum.  Given this possibility, I recommend the patient conclude his antibiotic therapy (has had some improvement over the past 24 hours).  -     ciprofloxacin-dexamethasone (CIPRODEX) otic suspension; Administer 4 drops to the right ear 2 (two) times a day.    Hypokalemia: Recheck.  -     Basic Metabolic Panel     Return in about 1 week (around 10/1/2020) for recheck if not improving.    Johnson Jeffrey MD  _______________________________    Chief Complaint   Patient presents with     Follow-up     right ear pain \" my ear is leaking fluid\"      Questions For Providers/results     recheck potassium      Subjective: Attila Rocha is a 50 y.o. year old male who I have seen in clinic before who presents with the following acute complaint(s):    Right ear pain: Patient's ear became increasingly painful over the weekend.  For the past 24-48 hours, there is been right-sided drainage.  Hearing is remained normal.  Some improvement today in comparison to before.  No fevers.  Mild right-sided sinus congestion.  Drainage has been purulent and not bloody.  No history of swimmer's ear.  No recent swimming.  He has been taking the antibiotic (cefdinir) as prescribed.    ROS: Complete review of systems obtained.  Pertinent items are listed above.     The following portions of the patient's history were reviewed and updated as appropriate: allergies, current medications, past medical history and problem list.   "   Objective:   /72 (Patient Site: Left Arm, Patient Position: Sitting, Cuff Size: Adult Large)   Pulse 72   Temp 98.6  F (37  C) (Oral)   Resp 20   SpO2 98% Comment: room air  General: No acute distress  Ears: The left external auditory canal is without abnormality.  The left TM is gray and glistening.  The right external auditory canal is edematous, contains copious amounts of discharge and purulent drainage.  The exam itself is tender (more so than his exam last week).    No results found for this or any previous visit (from the past 24 hour(s)).  No results found.    Additional History from Old Records Summarized (2): no  Decision to Obtain Records (1): no  Radiology Tests Summarized or Ordered (1): no  Labs Reviewed or Ordered (1): no  Medicine Test Summarized or Ordered (1): no  Independent Review of EKG or X-RAY(2 each): no    This note has been dictated using voice recognition software. Any grammatical or context distortions are unintentional and inherent to the software

## 2021-06-13 NOTE — PROGRESS NOTES
CHIEF COMPLAINT: recheck      HISTORY OF PRESENT ILLNESS    Attila was seen in follow up for recheck of right ear.  Since his last visit he has had no pain or drainage.  Overall the ear feels pretty good he has no new concerns today.  Last clinic visit with me:    Patient is instructed to keep the right ear dry.  He will return on Friday for otowick removal.    If there is still significant drainage and/or inflammation we may consider CSF otic powder in the near future.    All questions were answered he is agreeable to this plan of care.     REVIEW OF SYSTEMS    Review of Systems: a 10-system review is reviewed at this encounter.  See scanned document.     Penicillins     PHYSICAL EXAM:        HEAD: Normal appearance and symmetry:  No cutaneous lesions.      EARS:   Auricles normal    Ear endoscopy:     Otowick in place the otowick is removed.      There is residual gentian violet on the tympanic membrane ear canal.  There is a small amount of fungal hyphae debris on the tympanic membrane is removed with a suction along with a little bit of purulence.  Otherwise the external auditory canal and tympanic membrane appear normal and intact.     NOSE:    Dorsum:   straight       ORAL CAVITY/OROPHARYNX:    Lips:  Normal.     NECK:  Trachea:  midline       NEURO:   Alert and Oriented    GAIT AND STATION:  normal     RESPIRATORY:   Symmetry and Respiratory effort    PSYCH:   normal mood and affect    SKIN:  warm and dry         IMPRESSION:    Encounter Diagnosis   Name Primary?     Otitis externa, fungal, right ear Yes          RECOMMENDATIONS:    Patient should keep right ear dry.  See come back in 1 month for a baseline audiogram.  If he develops any discharge and/or discomfort he is to call the clinic immediately and we will call with CSF otic powder.  In terms of his CT results there is just shows some mild inflammation in the sinuses but there is concern about a potential potential periapical abscess for the left  upper molar.  Patient tells me that he has had a root canal done on this side in the past and his dentist is aware that there is a crack in 1 of these teeth 1 of these molars.  Nevertheless I recommend recommend that he follow-up with his dentist in the next several weeks.  All questions were answered he is agreeable this plan of care

## 2021-06-13 NOTE — PROGRESS NOTES
"Attila Rocha is a 50 y.o. male who is being evaluated via a billable video visit.      The patient has been notified of following:     \"This video visit will be conducted via a call between you and your physician/provider. We have found that certain health care needs can be provided without the need for an in-person physical exam.  This service lets us provide the care you need with a video conversation.  If a prescription is necessary we can send it directly to your pharmacy.  If lab work is needed we can place an order for that and you can then stop by our lab to have the test done at a later time.    Video visits are billed at different rates depending on your insurance coverage. Please reach out to your insurance provider with any questions.    If during the course of the call the physician/provider feels a video visit is not appropriate, you will not be charged for this service.\"    Patient has given verbal consent to a Video visit? Yes  How would you like to obtain your AVS? AVS Preference: MyChart.  If dropped by the video visit, the video invitation should be sent to: Text to cell phone: [phone]   Will anyone else be joining your video visit? No    Video Start Time: 8:33 AM    Additional provider notes:     Assessment/Plan:    Acute recurrent maxillary sinusitis  Recurrent severe symptoms. History of sinus infections.  Given acuity of onset, without concurrent symptoms of viral upper respiratory infection, I did prescribe an antibiotic.  Doxycycline prescribed (penicillin allergy).  I think use of eardrops is reasonable given purulent drainage from the ears.  Referral placed for ENT given recurrence, worsening of symptoms and history of statements of previous anatomic abnormalities.     Return in about 1 week (around 12/1/2020) for recheck if not improving.    Johnson Jeffrey MD  _______________________________    Chief Complaint   Patient presents with     Facial Pain     x 4 days      Ear Pain " "    x 4 days-right ear      Subjective: Attila Rocha is a 50 y.o. year old male who I have seen in clinic before who presents with the following acute complaint(s):    Ear pain:   - he says that his ear feels similar to his symptoms from September. He used some of the topical antibiotics from that time.  Now with sinus pressure.  Pain in teeth.  No fever.  \"Really full\" with  Headache.  Ears continue. Palliative: sudafed, ibuprofen, tylenol. He does not have a history of ear infections.  Mild drainage.    - the patient saw ENT in 2013 or 2014.  He was told that he might be a candidate for surgery.  \"Chronic sinusitis.\" Patient was told that the passages are \"pretty narrow.'  At the time, the plan was to use nasal rinse  Symptoms improved while he lived in [place]. Now worse in [place].   - he relates a purulent discharge in his ears as weel.     ROS: Complete review of systems obtained.  Pertinent items are listed above.     The following portions of the patient's history were reviewed and updated as appropriate: allergies, current medications, past medical history and problem list.     Objective:   There were no vitals taken for this visit.  Video visit.   General: No acute distress  Skin: No rashes or lesions noted on the face and hands.  Anicteric.  Respiratory: Non-labored breathing.  Psych: Normal affect.  Normal rate of speech.  No tangentiality.  Thinking is logical.  Neurologic: Cranial nerves II through XII grossly intact.    No results found for this or any previous visit (from the past 24 hour(s)).  No results found.    Additional History from Old Records Summarized (2): no  Decision to Obtain Records (1): no  Radiology Tests Summarized or Ordered (1): no  Labs Reviewed or Ordered (1): no  Medicine Test Summarized or Ordered (1): no  Independent Review of EKG or X-RAY(2 each): no    This note has been dictated using voice recognition software. Any grammatical or context distortions are unintentional and " inherent to the software    Video-Visit Details    Type of service:  Video Visit    Video End Time (time video stopped): 8:44 AM  Originating Location (pt. Location):     Distant Location (provider location):  [facility]     Platform used for Video Visit: Ilda Jeffrey MD

## 2021-06-13 NOTE — PROGRESS NOTES
HISTORY OF PRESENT ILLNESS  Asked to see by Dr. Wang for evaluation of sinus and ear. Patient reports history of ear infection x 2 in the last several months. His current symptoms include ear pain and clogging. He did have drainage. He also had sinus pressure. He has had recurring sinus infections. Sinus infection symptoms include pain, headache, drainage and sore throat. It occurs 2-4 x a year. Years ago [place], he reports a positive CT sinus in the past and it was recommended that he have surgery. Patient reports that he has 3 days left of his current antibiotic.    REVIEW OF SYSTEMS  Review of Systems: a 10-system review was performed. Pertinent positives are noted in the HPI and on a separate scanned document in the chart.    PMH, PSH, FH and SH has documented in the EHR.      EXAM    CONSTITUTIONAL  General Appearance:  Normal, well developed, well nourished, no obvious distress  Ability to Communicate:  communicates appropriately.    HEAD AND FACE  Appearance and Symmetry:  Normal, no scalp or facial scarring or suspicious lesions.  Paranasal sinuses tenderness:  Normal, Paranasal sinuses non tender    EYE  Normal external eye, conjunctiva, lids, cornea.     EARS  Clinical speech reception threshold:  Normal, able to hear normal speech.  Auricle:  Normal, Auricles without scars, lesions, masses.  External auditory canal: Fungal impaction right debrided under otomicroscopy using microdissection technique. Left ear canal normal.  Tympanic membrane:  Right TM with erythema and inflammation secondary to fungal infection, Left tympanic membrane normal without swelling or erythema.    NOSE (speculum or scope)  Architecture:  Normal, Grossly normal external nasal architecture with no masses or lesions.  Mucosa:  Normal mucosa, No polyps or masses.  Septum:  Normal, Septum non-obstructing.  Turbinates:  Normal, No turbinate abnormalities    ORAL CAVITY AND OROPHARYNX  Lips:   Normal.  Dental and gingiva:  Normal, No obvious dental or gingival disease.  Mucosa:  Normal, Moist mucous membranes.  Tongue:  Normal, Tongue mobile with no mucosal abnormalities  Hard and soft palate:  Normal, Hard and soft palate without cleft or mucosal lesions.  Oral pharynx:  Normal, Posterior pharynx without lesions or remarkable asymmetry.  Saliva:  Normal, Clear saliva.  Masses:  Normal, No palpable masses or pathologically enlarged lymph nodes.    NECK  Masses/lymph nodes:  Normal, No worrisome neck masses or lymph nodes.  Salivary glands:  Normal, Parotid and submandibular glands.  Trachea and larynx position:  Normal, Trachea and larynx midline.  Thyroid:  Normal, No thyroid abnormality.  Tenderness:  Normal, No cervical tenderness.  Suppleness:  Normal, Neck supple    CARDIOVASCULAR  Regular rate and rhythm.  No cyanosis, clubbing or edema.    PULSES  Carotid pulses normal    NEUROLOGICAL  Speech pattern:  Normal, Proasaic    RESPIRATORY  Symmetry and Respiratory effort:  Normal, Symmetric chest movement and expansion with no increased intercostal retractions or use of accessory muscles.     IMPRESSION  1. History of sinusitis. Finishing a course in the next several day.  2. Fungal otitis externa. Right ear canal filled with nystatin ointment.    RECOMMENDATION  1. CT sinus. It would be good to know it his syptoms have cleared with the antibiotic. If not then he would be a candidate for sinus surgery.  2. Follow up 1 week to have the nystatin ointment removed from the ear.     Darron Tristan MD

## 2021-06-13 NOTE — TELEPHONE ENCOUNTER
RN cannot approve Refill Request    RN can NOT refill this medication Protocol failed and NO refill given. Last office visit: 9/24/2020 Johnson Jeffrey MD Last Physical: Visit date not found Last MTM visit: Visit date not found Last visit same specialty: 9/24/2020 Johnson Jeffrey MD.  Next visit within 3 mo: Visit date not found  Next physical within 3 mo: Visit date not found      Sia Rodriguez, [facility] Connection Triage/Med Refill 12/8/2020    Requested Prescriptions   Pending Prescriptions Disp Refills     SAXENDA 3 mg/0.5 mL (18 mg/3 mL) PnIj [Pharmacy Med Name: Saxenda Subcutaneous Solution Pen-injector 18 MG/3ML] 15 mL 0     Sig: Inject (0.5 mls) 3 mg under the skin daily.       Insulin/GLP-1 Refill Protocol Failed - 12/8/2020  2:00 AM        Failed - Microalbumin in last year     No results found for: MICROALBUR               Passed - Visit with PCP or prescribing provider visit in last 6 months     Last office visit with prescriber/PCP: 9/24/2020 OR same dept: Visit date not found OR same specialty: 9/24/2020 Johnson Jeffrey MD Last physical: Visit date not found Last MTM visit: Visit date not found     Next appt within 3 mo: Visit date not found  Next physical within 3 mo: Visit date not found  Prescriber OR PCP: Johnson Jeffrey MD  Last diagnosis associated with med order: 1. Class 3 severe obesity due to excess calories with serious comorbidity and body mass index (BMI) of 45.0 to 49.9 in adult (H)  - SAXENDA 3 mg/0.5 mL (18 mg/3 mL) PnIj [Pharmacy Med Name: Saxenda Subcutaneous Solution Pen-injector 18 MG/3ML]; Inject (0.5 mls) 3 mg under the skin daily.  Dispense: 15 mL; Refill: 0    2. Prediabetes  - SAXENDA 3 mg/0.5 mL (18 mg/3 mL) PnIj [Pharmacy Med Name: Saxenda Subcutaneous Solution Pen-injector 18 MG/3ML]; Inject (0.5 mls) 3 mg under the skin daily.  Dispense: 15 mL; Refill: 0    3. Metabolic syndrome  - SAXENDA 3 mg/0.5 mL (18 mg/3 mL) PnGladys [Pharmacy Med Name: Saxenda Subcutaneous  Solution Pen-injector 18 MG/3ML]; Inject (0.5 mls) 3 mg under the skin daily.  Dispense: 15 mL; Refill: 0    4. Benign essential hypertension  - SAXENDA 3 mg/0.5 mL (18 mg/3 mL) PnIj [Pharmacy Med Name: Saxenda Subcutaneous Solution Pen-injector 18 MG/3ML]; Inject (0.5 mls) 3 mg under the skin daily.  Dispense: 15 mL; Refill: 0    5. Snores  - SAXENDA 3 mg/0.5 mL (18 mg/3 mL) PnIj [Pharmacy Med Name: Saxenda Subcutaneous Solution Pen-injector 18 MG/3ML]; Inject (0.5 mls) 3 mg under the skin daily.  Dispense: 15 mL; Refill: 0    If protocol passes may refill for 6 months if within 3 months of last provider visit (or a total of 9 months).              Passed - A1C in last 6 months     Hemoglobin A1c   Date Value Ref Range Status   09/18/2020 5.5 <=5.6 % Final     Comment:     Prediabetes:   HBA1c       5.7 to 6.4%        Diabetes:        HBA1c        >=6.5%   Patients with Hgb F >5%, total bilirubin >10.0 mg/dL, abnormal red cell turnover, severe renal or hepatic disease or malignancy should not have this A1C method used to diagnose or monitor diabetes.                   Passed - Blood pressure in last year     BP Readings from Last 1 Encounters:   09/24/20 130/72             Passed - Creatinine done in last year     Creatinine   Date Value Ref Range Status   09/24/2020 0.91 0.70 - 1.30 mg/dL Final

## 2021-06-13 NOTE — TELEPHONE ENCOUNTER
Refill Approved    Rx renewed per Medication Renewal Policy. Medication was last renewed on 9/18/20.    Sia Rodriguez, [facility] Triage/Med Refill 12/16/2020     Requested Prescriptions   Pending Prescriptions Disp Refills     valsartan-hydrochlorothiazide (DIOVAN-HCT) 160-25 mg per tablet [Pharmacy Med Name: Valsartan-hydroCHLOROthiazide Oral Tablet 160-25 MG] 90 tablet 0     Sig: TAKE ONE TABLET BY MOUTH ONE TIME DAILY       Diuretics/Combination Diuretics Refill Protocol  Passed - 12/15/2020  9:07 AM        Passed - Visit with PCP or prescribing provider visit in past 12 months     Last office visit with prescriber/PCP: 9/24/2020 Johnson Jeffrey MD OR same dept: 9/24/2020 Johnson Jeffrey MD OR same specialty: 9/24/2020 Johnson Jeffrey MD  Last physical: Visit date not found Last MTM visit: Visit date not found   Next visit within 3 mo: Visit date not found  Next physical within 3 mo: Visit date not found  Prescriber OR PCP: Johnson Jeffrey MD  Last diagnosis associated with med order: 1. Benign essential hypertension  - valsartan-hydrochlorothiazide (DIOVAN-HCT) 160-25 mg per tablet [Pharmacy Med Name: Valsartan-hydroCHLOROthiazide Oral Tablet 160-25 MG]; TAKE ONE TABLET BY MOUTH ONE TIME DAILY   Dispense: 90 tablet; Refill: 0    If protocol passes may refill for 12 months if within 3 months of last provider visit (or a total of 15 months).             Passed - Serum Potassium in past 12 months      Lab Results   Component Value Date    Potassium 3.9 09/24/2020             Passed - Serum Sodium in past 12 months      Lab Results   Component Value Date    Sodium 142 09/24/2020             Passed - Blood pressure on file in past 12 months     BP Readings from Last 1 Encounters:   09/24/20 130/72             Passed - Serum Creatinine in past 12 months      Creatinine   Date Value Ref Range Status   09/24/2020 0.91 0.70 - 1.30 mg/dL Final

## 2021-06-13 NOTE — TELEPHONE ENCOUNTER
"Refill Approved    Rx renewed per Medication Renewal Policy. Medication was last renewed on 6/5/20.    Sia Rodriguez, [facility] Triage/Med Refill 12/22/2020     Requested Prescriptions   Pending Prescriptions Disp Refills     pen needle, diabetic (ULTICARE PEN NEEDLE) 32 gauge x 5/32\" Ndle [Pharmacy Med Name: UltiCare Micro Pen Needles Miscellaneous 32G X 4 MM] 100 each 0     Sig: Use once daily as directed       Diabetic Supplies Refill Protocol Passed - 12/19/2020  2:00 AM        Passed - Visit with PCP or prescribing provider visit in last 6 months     Last office visit with prescriber/PCP: 9/24/2020 Johnson Jeffrey MD OR same dept: Visit date not found OR same specialty: 9/24/2020 Johnson Jeffrey MD  Last physical: Visit date not found Last MTM visit: Visit date not found   Next visit within 3 mo: Visit date not found  Next physical within 3 mo: Visit date not found  Prescriber OR PCP: Johnson Jeffrey MD  Last diagnosis associated with med order: 1. Class 3 severe obesity due to excess calories with serious comorbidity and body mass index (BMI) of 45.0 to 49.9 in adult (H)  - ULTICARE PEN NEEDLE 32 gauge x 5/32\" Ndle [Pharmacy Med Name: UltiCare Micro Pen Needles Miscellaneous 32G X 4 MM]; Use once daily as directed  Dispense: 100 each; Refill: 0    2. Prediabetes  - ULTICARE PEN NEEDLE 32 gauge x 5/32\" Ndle [Pharmacy Med Name: UltiCare Micro Pen Needles Miscellaneous 32G X 4 MM]; Use once daily as directed  Dispense: 100 each; Refill: 0    3. Metabolic syndrome  - ULTICARE PEN NEEDLE 32 gauge x 5/32\" Ndle [Pharmacy Med Name: UltiCare Micro Pen Needles Miscellaneous 32G X 4 MM]; Use once daily as directed  Dispense: 100 each; Refill: 0    4. Benign essential hypertension  - ULTICARE PEN NEEDLE 32 gauge x 5/32\" Ndle [Pharmacy Med Name: UltiCare Micro Pen Needles Miscellaneous 32G X 4 MM]; Use once daily as directed  Dispense: 100 each; Refill: 0    5. Snores  - ULTICARE PEN NEEDLE 32 gauge x 5/32\" " Ndle [Pharmacy Med Name: UltiCare Micro Pen Needles Miscellaneous 32G X 4 MM]; Use once daily as directed  Dispense: 100 each; Refill: 0    If protocol passes may refill for 12 months if within 3 months of last provider visit (or a total of 15 months).             Passed - A1C in last 6 months     Hemoglobin A1c   Date Value Ref Range Status   09/18/2020 5.5 <=5.6 % Final     Comment:     Prediabetes:   HBA1c       5.7 to 6.4%        Diabetes:        HBA1c        >=6.5%   Patients with Hgb F >5%, total bilirubin >10.0 mg/dL, abnormal red cell turnover, severe renal or hepatic disease or malignancy should not have this A1C method used to diagnose or monitor diabetes.

## 2021-06-14 NOTE — TELEPHONE ENCOUNTER
Refill Approved    Rx renewed per Medication Renewal Policy. Medication was last renewed on 3/17/20.    Adalberto Hargrove, [facility] Triage/Med Refill 1/29/2021     Requested Prescriptions   Pending Prescriptions Disp Refills     hydroCHLOROthiazide (MICROZIDE) 12.5 mg capsule [Pharmacy Med Name: hydroCHLOROthiazide Oral Capsule 12.5 MG] 90 capsule 0     Sig: TAKE ONE CAPSULE BY MOUTH ONE TIME DAILY       Diuretics/Combination Diuretics Refill Protocol  Passed - 1/28/2021  2:01 AM        Passed - Visit with PCP or prescribing provider visit in past 12 months     Last office visit with prescriber/PCP: 9/24/2020 Johnson Jeffrey MD OR same dept: 9/24/2020 Johnson Jeffrey MD OR same specialty: 9/24/2020 Johnson Jeffrey MD  Last physical: Visit date not found Last MTM visit: Visit date not found   Next visit within 3 mo: Visit date not found  Next physical within 3 mo: Visit date not found  Prescriber OR PCP: Johnson Jeffrey MD  Last diagnosis associated with med order: 1. Benign essential hypertension  - hydroCHLOROthiazide (MICROZIDE) 12.5 mg capsule [Pharmacy Med Name: hydroCHLOROthiazide Oral Capsule 12.5 MG]; TAKE ONE CAPSULE BY MOUTH ONE TIME DAILY   Dispense: 90 capsule; Refill: 0    If protocol passes may refill for 12 months if within 3 months of last provider visit (or a total of 15 months).             Passed - Serum Potassium in past 12 months      Lab Results   Component Value Date    Potassium 3.7 01/26/2021    Potassium 3.7 01/26/2021             Passed - Serum Sodium in past 12 months      Lab Results   Component Value Date    Sodium 142 01/26/2021             Passed - Blood pressure on file in past 12 months     BP Readings from Last 1 Encounters:   01/26/21 128/83             Passed - Serum Creatinine in past 12 months      Creatinine   Date Value Ref Range Status   01/26/2021 0.82 0.70 - 1.30 mg/dL Final

## 2021-06-14 NOTE — PROGRESS NOTES
" Hospital Follow-up Visit:    Assessment/Plan:     COVID-19  Hospital follow-up.  Patient was hospitalized with symptoms of COVID-19.  His risk factors including morbid obesity.  He describes interval improvement.  His oxygenation is now 95% on room air.  He is taking the discharge medications as prescribed.  He tends to feel winded but feels as though he is on the mend.  His wife is currently struggling symptoms of COVID-19 as well.  No additional recommendations made today.  He will resume his life and normal activities as tolerated.     Subjective:     Attila Rocha is a 50 y.o. male who presents for a hospital discharge follow up.    Hospital/Nursing Home/IP Rehab Facility: [facility]  Date of Admission: 01/22/2021  Date of Discharge:01/26/2021  Reason(s) for Admission: Confirmed COVID-19 Infection            Do you have any problems taking your medication regularly?  None       Have you had any changes in your medication since discharge? None       Have you had any difficulty following your discharge or treatment plan?  No    Narrrative History:   - continues to be tired.  \"winded easily.\"  Sleeping a lot.  Resting from his job.  \"Emotionally worn down.\"  Wife has been sick as well and was seen at the ED. Home O2 measurements: 95% today. (was in the mid 80s).    - potassium was low.    - he says that he has \"never been that sick.\"    Summary of hospitalization:  Hospital discharge summary reviewed  Diagnostic Tests/Treatments reviewed.  Follow up needed: None  Other Healthcare Providers Involved in Patient's Care: Patient Care Team:  Johnson Jeffrey MD as PCP - General (Family Medicine)  Johnson Jeffrey MD as Assigned PCP  Gurpreet Garcia MD as Assigned Surgical Provider    Update since discharge: {improved   Information from family, SNF, care coordination: reviewed.     Post Discharge Medication Reconciliation: discharge medications reconciled, continue medications without change  Plan of " care communicated with: patient    Objective:     There were no vitals filed for this visit.    Physical Exam:  Physical Exam  Constitutional:       General: He is not in acute distress.     Appearance: Normal appearance.   HENT:      Head: Normocephalic and atraumatic.   Eyes:      General: No scleral icterus.     Conjunctiva/sclera: Conjunctivae normal.   Pulmonary:      Effort: Pulmonary effort is normal.      Comments: Cough  Skin:     Findings: No rash.   Neurological:      General: No focal deficit present.      Mental Status: He is alert and oriented to person, place, and time.   Psychiatric:         Mood and Affect: Mood normal.         Behavior: Behavior normal.       Coding guidelines for this visit:  Type of Medical   Decision Making Face-to-Face Visit       within 7 Days of discharge Face-to-Face Visit        within 14 days of discharge   Moderate Complexity [id number]   High Complexity [id number]       Electronically signed by Johnson Jeffrey MD 01/29/21 9:24 AM

## 2021-06-14 NOTE — PROGRESS NOTES
Attila Rocha is a 50 y.o. male who is being evaluated via a billable video visit.      How would you like to obtain your AVS? MyChart.  If dropped from the video visit, the video invitation should be resent by: Text to cell phone: [phone] (M)   Will anyone else be joining your video visit? No    Video Start Time: 9:52 AM    Video-Visit Details    Type of service:  Video Visit    Video End Time (time video stopped): 10:06 AM  Originating Location (pt. Location): Home    Distant Location (provider location):  [facility]     Platform used for Video Visit: [employer]

## 2021-06-14 NOTE — PROGRESS NOTES
Clinic Care Coordination Contact  Community Health Worker Initial Outreach    Patient accepts CC: No, No needs.

## 2021-06-14 NOTE — PROGRESS NOTES
REASON FOR VISIT: Recheck ear      HISTORY OF PRESENT ILLNESS    Attila was seen in follow up for recheck of right ear.  Ear feels plugged again and he his having drainage.  No pain.  No other concerns.       Otitis externa, fungal, right ear Yes            RECOMMENDATIONS:     Patient should keep right ear dry.  See come back in 1 month for a baseline audiogram.  If he develops any discharge and/or discomfort he is to call the clinic immediately and we will call with CSF otic powder.  In terms of his CT results there is just shows some mild inflammation in the sinuses but there is concern about a potential potential periapical abscess for the left upper molar.  Patient tells me that he has had a root canal done on this side in the past and his dentist is aware that there is a crack in 1 of these teeth 1 of these molars.  Nevertheless I recommend recommend that he follow-up with his dentist in the next several weeks.  All questions were answered he is agreeable this plan of care       REVIEW OF SYSTEMS    Review of Systems: a 10-system review is reviewed at this encounter.  See scanned document.     Penicillins     PHYSICAL EXAM:        HEAD: Normal appearance and symmetry:  No cutaneous lesions.      EARS:   Auricles normal    MICROSCOPE:    Right EAC with thick, mucopurulent discharge (removed with suction).  TM thickened, mod inflamed     NOSE:    Dorsum:   straight       ORAL CAVITY/OROPHARYNX:    Lips:  Normal.     NECK:  Trachea:  midline       NEURO:   Alert and Oriented    GAIT AND STATION:  normal     RESPIRATORY:   Symmetry and Respiratory effort    PSYCH:   normal mood and affect    SKIN:  warm and dry         IMPRESSION:    Encounter Diagnosis   Name Primary?     Otitis externa, fungal, right ear Yes          RECOMMENDATIONS:      CSF/HC otic powder  Return 1 month with audiogram

## 2021-06-14 NOTE — TELEPHONE ENCOUNTER
Refill Approved    Rx renewed per Medication Renewal Policy. Medication was last renewed on 3/17/20, last OV 1/29/21.    Ana Lima, [facility] Triage/Med Refill 1/30/2021     Requested Prescriptions   Pending Prescriptions Disp Refills     hydroCHLOROthiazide (MICROZIDE) 12.5 mg capsule [Pharmacy Med Name: hydroCHLOROthiazide Oral Capsule 12.5 MG] 90 capsule 0     Sig: TAKE ONE CAPSULE BY MOUTH ONE TIME DAILY       Diuretics/Combination Diuretics Refill Protocol  Passed - 1/29/2021  3:29 PM        Passed - Visit with PCP or prescribing provider visit in past 12 months     Last office visit with prescriber/PCP: 9/24/2020 Johnson Jeffrey MD OR same dept: 9/24/2020 Johnson Jeffrey MD OR same specialty: 9/24/2020 Johnson Jeffrey MD  Last physical: Visit date not found Last MTM visit: Visit date not found   Next visit within 3 mo: Visit date not found  Next physical within 3 mo: Visit date not found  Prescriber OR PCP: Johnson Jeffrey MD  Last diagnosis associated with med order: 1. Benign essential hypertension  - hydroCHLOROthiazide (MICROZIDE) 12.5 mg capsule [Pharmacy Med Name: hydroCHLOROthiazide Oral Capsule 12.5 MG]; TAKE ONE CAPSULE BY MOUTH ONE TIME DAILY  Dispense: 90 capsule; Refill: 0    If protocol passes may refill for 12 months if within 3 months of last provider visit (or a total of 15 months).             Passed - Serum Potassium in past 12 months      Lab Results   Component Value Date    Potassium 3.7 01/26/2021    Potassium 3.7 01/26/2021             Passed - Serum Sodium in past 12 months      Lab Results   Component Value Date    Sodium 142 01/26/2021             Passed - Blood pressure on file in past 12 months     BP Readings from Last 1 Encounters:   01/26/21 128/83             Passed - Serum Creatinine in past 12 months      Creatinine   Date Value Ref Range Status   01/26/2021 0.82 0.70 - 1.30 mg/dL Final

## 2021-06-15 NOTE — PROGRESS NOTES
REASON FOR VISIT: Recheck      HISTORY OF PRESENT ILLNESS    Attila was seen in follow up for recheck of his right ear.  Patient was seen for fungal otitis externa.  He was placed on CSF otic powder.  He finished 1 week of the powder and then was hospitalized for 1 week with Covid.  He is currently on his second capsule.  He is no longer having any pain or drainage in the ear.  He had an audiogram today which was normal.  Denies any other ear nose or throat related concerns.  He has been diligent about keeping the ear dry.         REVIEW OF SYSTEMS    Review of Systems: a 10-system review is reviewed at this encounter.  See scanned document.     Penicillins     PHYSICAL EXAM:        HEAD: Normal appearance and symmetry:  No cutaneous lesions.      EARS:   Auricles normal    Examination of the right ear shows some residual powder on the tympanic membrane.  Tympanic membrane and EAC external auditory canals are intact with no evidence of with complete resolution of the infection.  Left ear is within normal limits     NOSE:    Dorsum:   straight       ORAL CAVITY/OROPHARYNX:    Lips:  Normal.     NECK:  Trachea:  midline       NEURO:   Alert and Oriented    GAIT AND STATION:  normal     RESPIRATORY:   Symmetry and Respiratory effort    PSYCH:   normal mood and affect    SKIN:  warm and dry         IMPRESSION:    Encounter Diagnoses   Name Primary?     Normal hearing noted on examination Yes     Otitis externa, fungal, right ear           RECOMMENDATIONS:    Resolution of right-sided otitis externa on CSF otic otic powder.  At this point patient can stop the powder.  I would urged him to maintain water precautions.  Follow-up as needed.  He is agreeable to plan of care all questions were answered  No orders of the defined types were placed in this encounter.     No medications were ordered this encounter

## 2021-06-16 PROBLEM — M76.71 PERONEAL TENDONITIS OF RIGHT LOWER EXTREMITY: Status: ACTIVE | Noted: 2019-09-25

## 2021-06-16 PROBLEM — R06.83 SNORES: Status: ACTIVE | Noted: 2018-10-04

## 2021-06-16 PROBLEM — E88.810 METABOLIC SYNDROME: Status: ACTIVE | Noted: 2018-10-08

## 2021-06-16 PROBLEM — L02.214 CUTANEOUS ABSCESS OF GROIN: Status: ACTIVE | Noted: 2019-12-06

## 2021-06-16 PROBLEM — R73.03 PREDIABETES: Status: ACTIVE | Noted: 2018-05-21

## 2021-06-16 PROBLEM — U07.1 COVID-19: Status: ACTIVE | Noted: 2021-01-22

## 2021-06-16 PROBLEM — S93.691A RUPTURE OF PLANTAR FASCIA OF RIGHT FOOT: Status: ACTIVE | Noted: 2019-09-25

## 2021-06-16 PROBLEM — E55.9 AVITAMINOSIS D: Status: ACTIVE | Noted: 2018-10-08

## 2021-06-16 PROBLEM — R60.9 SWELLING: Status: ACTIVE | Noted: 2018-11-07

## 2021-06-16 PROBLEM — J01.01 ACUTE RECURRENT MAXILLARY SINUSITIS: Status: ACTIVE | Noted: 2019-10-04

## 2021-06-16 PROBLEM — I10 BENIGN ESSENTIAL HYPERTENSION: Status: ACTIVE | Noted: 2018-10-04

## 2021-06-16 NOTE — TELEPHONE ENCOUNTER
Refill of CSF otic powder called into pharmacy. Patient notified and told him he should see Dr. Garcia in a month for a follow up. He expressed understanding.    Kalyani Acevedo RN  [facility]  [phone]

## 2021-06-16 NOTE — TELEPHONE ENCOUNTER
Patient called his fungal ear infection came back while on vacation had the otic powder which worked, but he is out.  Unable to get an appt with Dr. Garcia until 4/27.  Wondering if he can get a refill on the powder or what you advise.    Please call Attila and advise.  Thanks!

## 2021-06-16 NOTE — TELEPHONE ENCOUNTER
Telephone Encounter by Cassie Vila at 4/26/2019 12:15 PM     Author: Cassie Vila Service: -- Author Type: --    Filed: 4/26/2019 12:16 PM Encounter Date: 4/23/2019 Status: Signed    : Cassie Vila       PRIOR AUTHORIZATION DENIED    Denial Rational: Medication is completely excluded.

## 2021-06-17 NOTE — PATIENT INSTRUCTIONS - HE
"Patient Instructions by Layne Lorenzo PT at 2/25/2019  8:00 AM     Author: Layne Lorenzo PT Service: -- Author Type: Physical Therapist    Filed: 2/25/2019  8:49 AM Encounter Date: 2/25/2019 Status: Signed    : Layne Lorenzo PT (Physical Therapist)       [employer] - Instagram or website  [email or web address]  [phone]      STRENGTH   ABDOMINAL BRACING    While lying on your back, flatten your back down into the floor/bed and tighten your stomach muscles as you draw your navel down towards the floor.  Hold x5 seconds x10 reps.  Throughout the day for back support     STRENGTH   BRACE - BILATERAL BENT LEG LIFT    While lying on your back with your knees bent,  raise up 1 leg to \"table top\" position. Then bring other leg to match. Hold x3-5 seconds. Then lower 1 leg at a time. Use your stomach muscles to keep your spine from moving.  x10-15 reps 1-2 sets  3-5x/week      STRAIGHT LEG RAISE - SLR    While lying or sitting, raise up your leg with a straight knee.  Keep the opposite knee bent with the foot planted to the ground.  x25-30 reps 1-2 sets  1-2x/day      BRIDGE - MARCHING    While lying on your back, tighten your lower abdominals, squeeze your buttocks and then raise your buttocks off the floor/bed as creating a \"Bridge\" with your body.     While holding this position, lift one leg while maintaining a level pelvis. Set it back to the floor and then lift the opposite leg.  Hold each leg x2-3 seconds x10-15 leg lifts each. Perform 1-2 sets  3-5x/week            MONSTER WALK    Walk forwards, backwards and side step both ways with knees slightly bent, keeping tension in the resistance band.  UP and down hallway until fatigue or minimal R hip sensitivity.  2-3x/week         STANDING HEEL RAISES    While standing, raise up on your toes as you lift your heels off the ground.  x10-20 reps 1-2 sets  1-2x/day  CHANGE TO SINGLE LEG CALF RAISE!             SINGLE LEG STANCE - SLS    Stand " on one leg and maintain your balance.     STRAIGHT LEG SLIDERS      Lie on your back     Grab behind your knee slightly pulling your knee to your chest (you can use a towel if needed)     Straighten the leg as far as you can. Get a nice easy stretch. Do not hold     Bring the leg down     Repeat __10-20____ times     Repeat ___2-4____ times per day      PIRIFORMIS STRETCH    While lying on your back with both knee bent, cross your affected leg on the other knee.     Next, hold your unaffected thigh and pull it up towards your chest until a stretch is felt in the buttock.  Seated position works great  x10-20 seconds x2-3 reps after activity     Aerobic - trying to gradually get up to 150 minutes aerobic exercise per week and strength 2-3x/week    Strength routine  With chair behind - squat into chair, stand, overhead arm press - repeat 2-3 rounds of 8-12 reps, then 2-4 rounds climbing stairs,   Then counter push ups    Closer with feet will be less pressure on upper body - 8-12 reps    Can [place] through this routine 2-3 rounds with at least 1-2 minute rest in between rounds

## 2021-06-17 NOTE — TELEPHONE ENCOUNTER
Telephone Encounter by Cassie Vila at 4/13/2020  6:08 AM     Author: Cassie Vila Service: -- Author Type: --    Filed: 4/13/2020  6:10 AM Encounter Date: 4/8/2020 Status: Signed    : Cassie Vila APPROVED:    Approval start date: 03/10/2020  Approval end date:  07/10/2020    Pharmacy has been notified of approval and will contact patient when medication is ready for pickup.

## 2021-06-17 NOTE — PATIENT INSTRUCTIONS - HE
"Patient Instructions by Layne Lorenzo PT at 2/11/2019  8:00 AM     Author: Layne Lorenzo PT Service: -- Author Type: Physical Therapist    Filed: 2/11/2019  8:47 AM Encounter Date: 2/11/2019 Status: Signed    : Layne Lorenzo PT (Physical Therapist)         STRENGTH   ABDOMINAL BRACING    While lying on your back, flatten your back down into the floor/bed and tighten your stomach muscles as you draw your navel down towards the floor.  Hold x5 seconds x10 reps.  Feel muscle inside pelvic tighten with your fingers.   When easy to do try in sitting, standing, walking - any position to improve coordination of it.       STRAIGHT LEG RAISE - SLR    While lying or sitting, raise up your leg with a straight knee.  Keep the opposite knee bent with the foot planted to the ground.  x10-20 reps 1-2 sets  1-2x/day      BRIDGING    While lying on your back, tighten your lower abdominals, squeeze your buttocks and then raise your buttocks off the floor/bed as creating a \"Bridge\" with your body.  x2-3 seconds hold at top   x10-20 reps 1-2 sets  1-2x/day      CLAM SHELLS    While lying on your side with your knees bent, draw up the top knee while keeping contact of your feet together.    Do not let your pelvis roll back during the lifting movement.    x10-20 reps 1-2 sets  1-2x/day  Raise as far as feels good to do - even if not full motion!  ADD orange resistance band         STANDING HEEL RAISES    While standing, raise up on your toes as you lift your heels off the ground.  x10-20 reps 1-2 sets  1-2x/day             SINGLE LEG STANCE - SLS    Stand on one leg and maintain your balance.     STRAIGHT LEG SLIDERS      Lie on your back     Grab behind your knee slightly pulling your knee to your chest (you can use a towel if needed)     Straighten the leg as far as you can. Get a nice easy stretch. Do not hold     Bring the leg down     Repeat __10-20____ times     Repeat ___2-4____ times per day      PIRIFORMIS " STRETCH    While lying on your back with both knee bent, cross your affected leg on the other knee.     Next, hold your unaffected thigh and pull it up towards your chest until a stretch is felt in the buttock.  Seated position works great  x10-20 seconds x2-3 reps after activity     Aerobic - trying to gradually get up to 150 minutes aerobic exercise per week and strength 2-3x/week    Strength routine  With chair behind - squat into chair, stand, overhead arm press - repeat 2-3 rounds of 8-12 reps, then 2-4 rounds climbing stairs,   Then counter push ups    Closer with feet will be less pressure on upper body - 8-12 reps    Can [place] through this routine 2-3 rounds with at least 1-2 minute rest in between rounds

## 2021-06-17 NOTE — TELEPHONE ENCOUNTER
RN cannot approve Refill Request    RN can NOT refill this medication PCP messaged that patient is overdue for Labs. Last office visit: 9/24/2020 Johnson Jeffrey MD Last Physical: Visit date not found Last MTM visit: Visit date not found Last visit same specialty: 9/24/2020 Johnson Jeffrey MD.  Next visit within 3 mo: Visit date not found  Next physical within 3 mo: Visit date not found      [facility]a [place], [facility] Triage/Med Refill 4/29/2021    Requested Prescriptions   Pending Prescriptions Disp Refills     metFORMIN (GLUCOPHAGE-XR) 500 MG 24 hr tablet [Pharmacy Med Name: metFORMIN HCl ER Oral Tablet Extended Release 24 Hour 500 MG] 180 tablet 0     Sig: Take 2 tablets (1,000 mg total) by mouth daily with supper.       Metformin Refill Protocol Failed - 4/29/2021  2:00 AM        Failed - Microalbumin in last year      No results found for: MICROALBUR               Passed - Blood pressure in last 12 months     BP Readings from Last 1 Encounters:   01/26/21 128/83             Passed - LFT or AST or ALT in last 12 months     Albumin   Date Value Ref Range Status   01/22/2021 3.3 (L) 3.5 - 5.0 g/dL Final     Bilirubin, Total   Date Value Ref Range Status   01/22/2021 0.6 0.0 - 1.0 mg/dL Final     Bilirubin, Direct   Date Value Ref Range Status   01/21/2021 0.2 <=0.5 mg/dL Final     Alkaline Phosphatase   Date Value Ref Range Status   01/22/2021 54 45 - 120 U/L Final     AST   Date Value Ref Range Status   01/22/2021 35 0 - 40 U/L Final     ALT   Date Value Ref Range Status   01/22/2021 40 0 - 45 U/L Final     Protein, Total   Date Value Ref Range Status   01/22/2021 6.7 6.0 - 8.0 g/dL Final                Passed - GFR or Serum Creatinine in last 6 months     GFR MDRD Non Af Amer   Date Value Ref Range Status   01/26/2021 >60 >60 mL/min/1.73m2 Final     GFR MDRD Af Amer   Date Value Ref Range Status   01/26/2021 >60 >60 mL/min/1.73m2 Final             Passed - Visit with PCP or prescribing provider  visit in last 6 months or next 3 months     Last office visit with prescriber/PCP: Visit date not found OR same dept: Visit date not found OR same specialty: 9/24/2020 Johnson Jeffrey MD Last physical: Visit date not found Last MTM visit: Visit date not found         Next appt within 3 mo: Visit date not found  Next physical within 3 mo: Visit date not found  Prescriber OR PCP: Johnson Jeffrey MD  Last diagnosis associated with med order: 1. Prediabetes  - metFORMIN (GLUCOPHAGE-XR) 500 MG 24 hr tablet [Pharmacy Med Name: metFORMIN HCl ER Oral Tablet Extended Release 24 Hour 500 MG]; Take 2 tablets (1,000 mg total) by mouth daily with supper.  Dispense: 180 tablet; Refill: 0     If protocol passes may refill for 12 months if within 3 months of last provider visit (or a total of 15 months).           Passed - A1C in last 6 months     Hemoglobin A1c   Date Value Ref Range Status   01/23/2021 5.9 (H) <=5.6 % Final     Comment:     Prediabetes:   HBA1c       5.7 to 6.4%        Diabetes:        HBA1c        >=6.5%   Patients with Hgb F >5%, total bilirubin >10.0 mg/dL, abnormal red cell turnover, severe renal or hepatic disease or malignancy should not have this A1C method used to diagnose or monitor diabetes.

## 2021-06-17 NOTE — PATIENT INSTRUCTIONS - HE
"Patient Instructions by Layne Lorenzo PT at 1/24/2019  2:30 PM     Author: Layne Lorenzo PT Service: -- Author Type: Physical Therapist    Filed: 1/24/2019  3:40 PM Encounter Date: 1/24/2019 Status: Signed    : Layne Lorenzo PT (Physical Therapist)        CLAM SHELLS    While lying on your side with your knees bent, draw up the top knee while keeping contact of your feet together.    Do not let your pelvis roll back during the lifting movement.    x10-20 reps 1-2 sets  1-2x/day  Raise as far as feels good to do - even if not full motion!      BRIDGING    While lying on your back, tighten your lower abdominals, squeeze your buttocks and then raise your buttocks off the floor/bed as creating a \"Bridge\" with your body.  x2-3 seconds hold at top   x10-20 reps 1-2 sets  1-2x/day     STRAIGHT LEG SLIDERS      Lie on your back     Grab behind your knee slightly pulling your knee to your chest (you can use a towel if needed)     Straighten the leg as far as you can. Get a nice easy stretch. Do not hold     Bring the leg down     Repeat __10-20____ times     Repeat ___2-4____ times per day      PIRIFORMIS STRETCH    While lying on your back with both knee bent, cross your affected leg on the other knee.     Next, hold your unaffected thigh and pull it up towards your chest until a stretch is felt in the buttock.  Seated position works great  x10-20 seconds x2-3 reps after activity     Aerobic - trying to gradually get up to 150 minutes aerobic exercise per week and strength 2-3x/week    Strength routine  With chair behind - squat into chair, stand, overhead arm press - repeat 2-3 rounds of 8-12 reps, then 2-4 rounds climbing stairs,   Then counter push ups    Closer with feet will be less pressure on upper body - 8-12 reps    Can [place] through this routine 2-3 rounds with at least 1-2 minute rest in between rounds                 "

## 2021-06-17 NOTE — PATIENT INSTRUCTIONS - HE
Patient Instructions by Layne Lorenzo PT at 1/16/2020 10:00 AM     Author: Layne Lorenzo PT Service: -- Author Type: Physical Therapist    Filed: 1/16/2020 11:12 AM Encounter Date: 1/16/2020 Status: Signed    : Layne Lorenzo PT (Physical Therapist)        PRONE LYING    Lie on your stomach on a flat surface with either a towel roll under your forehead or your hands under your forehead.  You can also place a pillow under your abdomen.  x30-60 seconds    PRONE ON ELBOWS - HERIBERTO    Lying face down, slowly press up and prop yourself up on your elbows.  x30-60 seconds    PRESS UPS    Lying face down, slowly press up and arch your back using your arms.  Hold x1-2 seconds at top.  x5-10 reps       STANDING EXTENSIONS    Start by standing and place your hands on your hips with your thumbs grasping your low back. Lean back to arch your back then return to starting position. Use your thumbs to help isolate where you want to bend.   x5-10 reps every         LOWER TRUNK ROTATIONS - LTR    Lying on your back with your knees bent, gently move your knees side-to-side.  x5-10 reps gentle - easy and slow  2-3x/day           BUTTOCK SQUEEZE    While lying on a firm flat surface with knees bent to 90 degree, squeeze buttocks.     Can do in any position  Holding 3-5 seconds x3-5 reps  Throughout day 2-3x/day      PELVIC TILT    While lying on your back, use your stomach muscles to press your spine downwards towards the ground. Do not move into a painful position.  Hold x3-5 seconds x3-5 reps 2-3x/day     Ice or Heat 10-15 minutes can do every 2 hours or further apart as helpful    Ibuprofen generic recommendation [id number] mg per day (200 mg tablets 3-4 pills 3x/day)

## 2021-06-17 NOTE — PATIENT INSTRUCTIONS - HE
Patient Instructions by Sia Ibrahim MD at 11/29/2019  7:30 AM     Author: Sia Ibrahim MD Service: -- Author Type: Physician    Filed: 11/29/2019  9:53 AM Encounter Date: 11/29/2019 Status: Addendum    : Sia Ibrahim MD (Physician)    Related Notes: Original Note by Sia Ibrahim MD (Physician) filed at 11/29/2019  9:53 AM         Patient Education     Abscess (Incision & Drainage)  An abscess is sometimes called a boil. It happens when bacteria get trapped under the skin and start to grow. Pus forms inside the abscess as the body responds to the bacteria. An abscess can happen with an insect bite, ingrown hair, blocked oil gland, pimple, cyst, or puncture wound.  Your healthcare provider has drained the pus from your abscess. If the abscess pocket was large, your healthcare provider may have put in gauze packing. Your provider will need to remove it on your next visit. He or she may also replace it at that time. You may not need antibiotics to treat a simple abscess, unless the infection is spreading into the skin around the wound (cellulitis).  The wound will take about 1 to 2 weeks to heal, depending on the size of the abscess. Healthy tissue will grow from the bottom and sides of the opening until it seals over.  Home care  These tips can help your wound heal:    The wound may drain for the first 2 days. Cover the wound with a clean dry dressing. Change the dressing if it becomes soaked with blood or pus.    If a gauze packing was placed inside the abscess pocket, you may be told to remove it yourself. You may do this in the shower. Once the packing is removed, you should wash the area in the shower, or clean the area as directed by your provider. Continue to do this until the skin opening has closed. Make sure you wash your hands after changing the packing or cleaning the wound.    If you were prescribed antibiotics, take them as directed until they are all gone.    You may use  acetaminophen or ibuprofen to control pain, unless another pain medicine was prescribed. If you have liver disease or ever had a stomach ulcer, talk with your doctor before using these medicines.  Follow-up care  Follow up with your healthcare provider, or as advised. If a gauze packing was put in your wound, it should be removed in 1 to 2 days. Check your wound every day for any signs that the infection is getting worse. The signs are listed below.  When to seek medical advice  Call your healthcare provider right away if any of these occur:    Increasing redness or swelling    Red streaks in the skin leading away from the wound    Increasing local pain or swelling    Continued pus draining from the wound 2 days after treatment    Fever of 100.4 F (38 C) or higher, or as directed by your healthcare provider    Boil returns when you are at home  Date Last Reviewed: 9/1/2016 2000-2017 [employer]. [address] [place] [id number]. All rights reserved. This information is not intended as a substitute for professional medical care. Always follow your healthcare professional's instructions.

## 2021-06-17 NOTE — TELEPHONE ENCOUNTER
Telephone Encounter by Cassie Vila at 6/12/2020 10:57 AM     Author: Cassie Vila Service: -- Author Type: --    Filed: 6/12/2020 11:00 AM Encounter Date: 6/11/2020 Status: Signed    : Cassie Vila APPROVED:    Approval start date: 05/12/2020  Approval end date:  06/12/2021    Pharmacy has been notified of approval and will contact patient when medication is ready for pickup.

## 2021-06-18 NOTE — PATIENT INSTRUCTIONS - HE
"Patient Instructions by Layne Lorenzo PT at 2/20/2020  4:00 PM     Author: Layne Lorenzo PT Service: -- Author Type: Physical Therapist    Filed: 2/20/2020  4:44 PM Encounter Date: 2/20/2020 Status: Signed    : Layne Lorenzo PT (Physical Therapist)        PRONE LYING    Lie on your stomach on a flat surface with either a towel roll under your forehead or your hands under your forehead.  You can also place a pillow under your abdomen.  x30-60 seconds    PRONE ON ELBOWS - HERIBERTO    Lying face down, slowly press up and prop yourself up on your elbows.  x30-60 seconds    PRESS UPS    Lying face down, slowly press up and arch your back using your arms.  Hold x1-2 seconds at top.  x5-10 reps       STANDING EXTENSIONS    Start by standing and place your hands on your hips with your thumbs grasping your low back. Lean back to arch your back then return to starting position. Use your thumbs to help isolate where you want to bend.   x5-10 reps every       LOWER TRUNK ROTATIONS - LTR    Lying on your back with your knees bent, gently move your knees side-to-side.  Warm up move 2-3 reps         Lay on back and stretch leg straight up and then with belt help stretch in across middle of body and then out to side to perform nerve glide x10-20 reps 1-4x/day     BRIDGE - MARCHING    While lying on your back, tighten your lower abdominals, squeeze your buttocks and then raise your buttocks off the floor/bed as creating a \"Bridge\" with your body.     While holding this position, lift one leg while maintaining a level pelvis. Set it back to the floor and then lift the opposite leg.  x10 reps each leg march x1-2 sets  1-2x/day  OR    SINGLE LEG BRIDGE    While lying on your back with your knees bent, extend one knee as shown.     Next, raise your buttocks off the floor/bed.      Try and maintain your pelvis level the entire time.  x10 reps 1-2 sets  1-2x/day      STRAIGHT LEG RAISE - SLR    Tighten abs first!  Then raise up " your R leg with a straight knee.  Keep the opposite knee bent with the foot planted to the ground.  x5-10 reps - work up to 20 reps in a row as able   1-2x/day

## 2021-06-18 NOTE — PATIENT INSTRUCTIONS - HE
"Patient Instructions by Layne Lorenzo PT at 3/12/2020 10:30 AM     Author: Layne Lorenzo PT Service: -- Author Type: Physical Therapist    Filed: 3/12/2020 11:06 AM Encounter Date: 3/12/2020 Status: Signed    : Layne Lorenzo PT (Physical Therapist)        PRONE LYING    Lie on your stomach on a flat surface with either a towel roll under your forehead or your hands under your forehead.  You can also place a pillow under your abdomen.  x30-60 seconds    PRONE ON ELBOWS - HERIBERTO    Lying face down, slowly press up and prop yourself up on your elbows.  x30-60 seconds    PRESS UPS    Lying face down, slowly press up and arch your back using your arms.  Hold x1-2 seconds at top.  x5-10 reps       STANDING EXTENSIONS    Start by standing and place your hands on your hips with your thumbs grasping your low back. Lean back to arch your back then return to starting position. Use your thumbs to help isolate where you want to bend.   x5-10 reps every       LOWER TRUNK ROTATIONS - LTR    Lying on your back with your knees bent, gently move your knees side-to-side.  Warm up move 2-3 reps         Lay on back and stretch leg straight up and then with belt help stretch in across middle of body and then out to side to perform nerve glide x10-20 reps 1-4x/day    Buttock and abd isometric squeeze x3-5 seconds x10 reps 2-3x/day      HOLD ON THESE   BRIDGE - MARCHING    While lying on your back, tighten your lower abdominals, squeeze your buttocks and then raise your buttocks off the floor/bed as creating a \"Bridge\" with your body.     While holding this position, lift one leg while maintaining a level pelvis. Set it back to the floor and then lift the opposite leg.  x10 reps each leg march x1-2 sets  1-2x/day  OR    SINGLE LEG BRIDGE    While lying on your back with your knees bent, extend one knee as shown.     Next, raise your buttocks off the floor/bed.      Try and maintain your pelvis level the entire time.  x10 reps 1-2 " sets  1-2x/day      STRAIGHT LEG RAISE - SLR    Tighten abs first!  Then raise up your R leg with a straight knee.  Keep the opposite knee bent with the foot planted to the ground.  x5-10 reps - work up to 20 reps in a row as able   1-2x/day

## 2021-06-18 NOTE — PATIENT INSTRUCTIONS - HE
"Patient Instructions by Layne Lorenzo PT at 1/31/2020  9:30 AM     Author: Layne Lorenzo PT Service: -- Author Type: Physical Therapist    Filed: 1/31/2020 10:09 AM Encounter Date: 1/31/2020 Status: Signed    : Layne Lorenzo PT (Physical Therapist)        PRONE LYING    Lie on your stomach on a flat surface with either a towel roll under your forehead or your hands under your forehead.  You can also place a pillow under your abdomen.  x30-60 seconds    PRONE ON ELBOWS - HERIBERTO    Lying face down, slowly press up and prop yourself up on your elbows.  x30-60 seconds    PRESS UPS    Lying face down, slowly press up and arch your back using your arms.  Hold x1-2 seconds at top.  x5-10 reps       STANDING EXTENSIONS    Start by standing and place your hands on your hips with your thumbs grasping your low back. Lean back to arch your back then return to starting position. Use your thumbs to help isolate where you want to bend.   x5-10 reps every       LOWER TRUNK ROTATIONS - LTR    Lying on your back with your knees bent, gently move your knees side-to-side.  Warm up move 2-3 reps     Lay on L side - rotate R shoulder back as far as comfortable and then kick R leg out to stretch hamstring and get nerve glide x10-20 reps 1-4x/day          BUTTOCK SQUEEZE - OPTIONAL    While lying on a firm flat surface with knees bent to 90 degree, squeeze buttocks.     Can do in any position  Can do for blood flow with driving      BRIDGING    While lying on your back, tighten your lower abdominals, squeeze your buttocks and then raise your buttocks off the floor/bed as creating a \"Bridge\" with your body.  Hold x5 seconds x5-10 reps 1-2x/day      STRAIGHT LEG RAISE - SLR    Tighten abs first!  Then raise up your R leg with a straight knee.  Keep the opposite knee bent with the foot planted to the ground.  x5-10 reps - work up to 20 reps in a row as able   1-2x/day     Ice or Heat 10-15 minutes can do every 2 hours or further " apart as helpful    Ibuprofen generic recommendation [id number] mg per day (200 mg tablets 3-4 pills 3x/day)

## 2021-06-19 NOTE — LETTER
Letter by Johnson Jeffrey MD at      Author: Johnson Jeffrey MD Service: -- Author Type: --    Filed:  Encounter Date: 9/18/2019 Status: (Other)         Dear  Attila,    I am reaching out to see if you would be interested in serving on a patient advisory board. A group of medical school faculty and health system leaders at [facility] is working to understand how the health system can better create health and design healthcare together with patients, specifically around obesity and weight management.  It is called the Co-Production group.     Having excess weight is a very personal and sensitive topic, so the Co-Production group wants to closely involve patients to guide their work. Their ultimate goal is to learn how the health system can help more patients reach their healthy weight goals, so it is crucial to have patient perspectives. Thus, the group wants to have a patient advisory panel, the patients would serve as Care Design Partners and have equal voice in the Co-Production groups meetings.     The Co-Production group has received guidance from the [employer] research and [facility], both of which run patient advisory panels. The recommended approach is to have individual clinicians reach out to individual patients who might be willing to be a Care Design Partner.     The commitment would be to attend a Co-Production group meeting about 10 times per year for 1 hour, on the [facility]. There would be other Care Design Partners at each meeting: a 6-member patient advisory panel, and the Co-Production group has approximately 6 members at each meeting. Care Design Partners will be reimbursed $60.00 per meeting.      I am reaching out to you because you are a patient whom I know would give thoughtful insight and feedback to the Co-Production group. Whether you choose to be Care Design Partner or not, it will not affect  your relationship with me, the clinic, or [facility]. I will not know whether or not you become a Care Design Partner.     If you are interested in being on the panel, you can email this email address to let them know you are interested: Romy Doss, [email or web address], or call this phone number: [phone].     Thank you very much for considering being a Care Design Partner.     Johnson Poole MD

## 2021-06-20 NOTE — PROGRESS NOTES
Assessment/Plan:    Class 3 severe obesity due to excess calories with serious comorbidity and body mass index (BMI) of 45.0 to 49.9 in adult (H)  48 y.o. male in clinic today to discuss treatment of the following conditions through radical diet change, lifestyle modification and weight loss:  1. Class 3 severe obesity due to excess calories with serious comorbidity and body mass index (BMI) of 45.0 to 49.9 in adult (H)    2. Benign essential hypertension    3. Snores    4. Prediabetes    5. Screening for endocrine, nutritional, metabolic and immunity disorder    6. Screening for deficiency anemia    7. Screening for lipid disorders    8. Screening for thyroid disorder      This patient has multiple factors that resulted in struggles to lose weight.  He has metabolic syndrome (obesity, hypertension, prediabetes).  He is extremely high risk for sleep apnea.  Stress has been high.  We discussed behavioral interventions as well as pharmacologic support.  -Concern for binge eating disorder or night eating syndrome.  Continue to monitor.  - Trial of diethylpropion.  - Resume metformin but at a lower dose.  I recommend 1000 mg daily.    -Referral to sleep medicine  -Return to clinic in 1 month.             The patient attended group visit to learn about obesity as a disease, an approach to treatment and metabolic factors. Nutrition counseling reviewed., Discussed all weight loss options with the patient including bariatric surgery. The patient expressed understanding and wishes to pursue medical weight loss at this time., Labs ordered and will review and discuss with the patient., Body composition analyzer done and results reviewed with the patient. Please see scanned results in chart. and Recommend 2000 mg of fish oil daily, a multivitamin daily, chromium as directed, and vitamin D supplementation as directed.    Dietary Intervention: Reduced calorie, reduced carbohydrates, whole food diet., Recommend increasing movement  "throughout the day--sitting less, moving more. Will increase activity over time to reach a goal of at least 150 minutes of moderate exercise each week., Behavior modification: Cognitive restructuring exercises, journaling stressors, triggers for food cravings., Recommend journaling and tracking food intake using either an online program such as BzzAgentnesspal.com or GoSaveit.com or tracking using a paper and pencil. Advised paying particular attention to total carbohydrates, fiber, protein, calories, and fats, and added sugars. and Greater than 50% of this 45 minute visit was spent in counseling regarding obesity is a disease as well as the nutritional and exercise recommendations of our program as it pertains to the patient's own individual healthcare needs.           This note has been dictated using voice recognition software. Any grammatical or context distortions are unintentional and inherent to the software    ______________________________    SUBJECTIVE:    48 y.o. year old male with past medical history including 10+ years of hypertension presenting to clinic today to discuss treatment of these conditions through radical diet change, lifestyle modification, and weight loss (intensive therapeutic lifestyle interventions including nutrition, physical activity, and behavior management).    This patient has struggled with weight for much of his life.  He describes himself as feeling as though he is stuck in a bad place.  He says that he \"wants to change because it is the right thing to do, it is only right to take care of the body got is given me not to abuse it.\"  He has long-standing hypertension.  He is a family history of diabetes.  He snores and believes that he has sleep apnea.    The patient recently moved to [place] from [place].  He has 2 teenage sons.  He is a  at a local [employer].  Prior to living in [place] he was in [place] for a number of years.  He is originally from [place].    The patient " has tried to lose weight in various ways throughout the years.  A couple years ago he was working out regularly and was able to get down to approximately 320 pounds.  A number of years ago he was on a protein shake diet through some type of clinic and lost 70 pounds.  He has been on phentermine in the past and believes that it caused an increase of anxiety as well as a clenching sensation of his mandible.    The patient describes overwhelming cravings.  He believes that he eats out of habit as well as for emotional reasons.  He specifically wonders if he has problems with binging.    He considers himself deconditioned and he believes that his weight limits his ability to be physically active.    The patient has been overweight for most of his life.  He was in the 250s in his 20s years.  Contributing factors includes: Back surgery, sedentary lifestyle, metabolic syndrome, obstructive sleep apnea.  The patient does not exercise.  The patient does have problems (physical, logistical) with exercise.  The patient does not eat nutritiously.  The patient does overeat.  The patient does snore.  The patient does not have a history of eating disorder.  The patient does not have a history of substance abuse (alcohol, drugs).    Social effects of obesity: low self esteem, body image dissatisfaction, diminished sex drive, impaired intimacy and sexual relationships or decreased work productivity    Patient Active Problem List   Diagnosis     Blood glucose abnormal     Class 3 severe obesity due to excess calories with serious comorbidity and body mass index (BMI) of 45.0 to 49.9 in adult (H)     Benign essential hypertension     Snores       History reviewed. No pertinent past medical history.    Past Surgical History:   Procedure Laterality Date     BACK SURGERY       KNEE SURGERY         No current outpatient prescriptions on file prior to visit.     No current facility-administered medications on file prior to visit.         Allergies   Allergen Reactions     Penicillins Other (See Comments)     Rash      Family History   Problem Relation Age of Onset     Hypertension Mother      Breast cancer Mother      Bipolar disorder Father      No Medical Problems Son      No Medical Problems Son      No Medical Problems Sister      Diabetes Maternal Grandmother      Colon cancer Maternal Grandmother      Colon cancer Paternal Grandfather      Heart disease Neg Hx      Prostate cancer Neg Hx      Social History     Social History     Marital status:      Spouse name: N/A     Number of children: N/A     Years of education: N/A     Social History Main Topics     Smoking status: Never Smoker     Smokeless tobacco: Never Used     Alcohol use No     Drug use: No     Sexual activity: Not Asked     Other Topics Concern     None     Social History Narrative     None         ROS  A comprehensive review of systems was negative.  Pertinent items are noted in HPI.  Patient denies chest pain or pressure, shortness of breath, exertional intolerance, palpitations, or lightheadedness.    Vitals:    10/04/18 0748   BP: 132/80   Pulse: 88     Initial Weight: 360 lbs  Weight: 360 lb (163.3 kg)  Weight loss from initial: 0  % Weight loss: 0 %  Body Fat %: 45.4  Waist Circumference: 58.5 inches  Neck Circumference: 19 inches  Body mass index is 47.5 kg/(m^2).    EXAM    Gen: Alert, pleasant, cooperative, no distress, appears stated age, patient is obese.  Eyes: No conjunctival injection, no scleral icterus.  Neck: Supple, symmetrical, trachea midline.  No adenopathy.  Thyroid is without enlargement/tenderness/nodules.  Mouth: mallampati 4  Cardiac: Regular rate and rhythm, normal S1/S2, no murmurs or gallops, no edema at ankles bilaterally.  Respiratory: Clear to auscultation bilaterally.  Abdomen: Soft, nontender, no hepatosplenomegaly.  Extremities: Warm, well-perfused, no edema.  Skin: Skin, turgor, texture color is normal. Skin tags: No, striae: No,  hirsutism: NA, acanthosis nigricans: Yes.  Neurologic: Cranial nerves II through XII grossly intact.  2+ reflexes at the patella bilaterally.  Psych: Normal affect.  Normal rate of speech.  No tangentiality.      Stop bang score: 7.    Body composition analyzer done and results reviewed with the patient.  Please see scanned results in chart.  Labs ordered and will review and discuss with the patient.    Recent Results (from the past 24 hour(s))   HM2(CBC w/o Differential)   Result Value Ref Range    WBC 6.0 4.0 - 11.0 thou/uL    RBC 5.31 4.40 - 6.20 mill/uL    Hemoglobin 15.4 14.0 - 18.0 g/dL    Hematocrit 46.1 40.0 - 54.0 %    MCV 87 80 - 100 fL    MCH 29.0 27.0 - 34.0 pg    MCHC 33.5 32.0 - 36.0 g/dL    RDW 12.4 11.0 - 14.5 %    Platelets 218 140 - 440 thou/uL    MPV 7.9 7.0 - 10.0 fL   Glycosylated Hemoglobin A1c   Result Value Ref Range    Hemoglobin A1c 6.1 (H) 3.5 - 6.0 %   Electrocardiogram Perform - Clinic   Result Value Ref Range    SYSTOLIC BLOOD PRESSURE  mmHg    DIASTOLIC BLOOD PRESSURE  mmHg    VENTRICULAR RATE 75 BPM    ATRIAL RATE 75 BPM    P-R INTERVAL 180 ms    QRS DURATION 96 ms    Q-T INTERVAL 382 ms    QTC CALCULATION (BEZET) 426 ms    P Axis 23 degrees    R AXIS 22 degrees    T AXIS 14 degrees    MUSE DIAGNOSIS       Normal sinus rhythm  Normal ECG  No previous ECGs available       EKG: My personal interpretation- normal sinus rhythm.

## 2021-06-20 NOTE — LETTER
Letter by Jhoana Kraft RN at      Author: Jhoana Kraft RN Service: -- Author Type: --    Filed:  Encounter Date: 6/19/2020 Status: (Other)       6/19/2020        Attila Rocha  [address] [facility] [address]    This letter provides a written record that you were tested for COVID-19 on 6/18/20.     Your result was negative. This means that we didnt find the virus that causes COVID-19 in your sample. A test may show negative when you do actually have the virus. This can happen when the virus is in the early stages of infection, before you feel illness symptoms.    If you have symptoms   Stay home and away from others (self-isolate) until you meet ALL of the guidelines below:    Youve had no fever--and no medicine that reduces fever--for 3 full days (72 hours). And ?    Your other symptoms have gotten better. For example, your cough or breathing has improved. And?    At least 10 days have passed since your symptoms started.    During this time:    Stay home. Dont go to work, school or anywhere else.     Stay in your own room, including for meals. Use your own bathroom if you can.    Stay away from others in your home. No hugging, kissing or shaking hands. No visitors.    Clean high touch surfaces often (doorknobs, counters, handles, etc.). Use a household cleaning spray or wipes. You can find a full list on the EPA website at www.epa.gov/pesticide-registration/list-n-disinfectants-use-against-sars-cov-2.    Cover your mouth and nose with a mask, tissue or washcloth to avoid spreading germs.    Wash your hands and face often with soap and water.    Going back to work  Check with your employer for any guidelines to follow for going back to work.    Employers: This document serves as formal notice that your employee tested negative for COVID-19, as of the testing date shown above.         
2021 22:00
2021 23:25

## 2021-06-20 NOTE — PROGRESS NOTES
APTA Sit to  30 seconds (no arms allowed)  9 times    Fall Risk  13 or more times = low risk  9-12 times = moderate risk (refer to PT)  8 or less times = high risk (refer to PT)    Thank you,  Rashmi Brown

## 2021-06-21 NOTE — PROGRESS NOTES
Dear Dr. Johnson Jeffrey Md  [address] [place] [address]    Thank you for the opportunity to participate in the care of Mr. Attila Rocha.    He is a 48 y.o. male who comes to the clinic with a chief complaint of excessive daytime sleepiness has been going on for more than 2 years.  While the patient denies any episodes of witnessed apnea, he has been told that he has loud snoring during sleep.  He also complains of frequent episodes of nocturnal awakening secondary to snoring.  The patient's review of system is otherwise unremarkable.     Ideal Sleep-Wake Cycle(devoid of societal pressure):    Patient would try to initiate sleep at around 10-11 PM with a sleep latency of 10 minutes. The patient would have 3-4 nocturnal awakening. Final wake up time is around 7 AM.    Past Medical History  No past medical history on file.     Past Surgical History  Past Surgical History:   Procedure Laterality Date     BACK SURGERY       KNEE SURGERY          Meds  Current Outpatient Medications   Medication Sig Dispense Refill     diethylpropion 75 mg TbER Take 75 mg by mouth daily. 30 each 0     ergocalciferol (ERGOCALCIFEROL) 50,000 unit capsule Take 1 capsule (50,000 Units total) by mouth once a week for 12 doses. 12 capsule 0     hydroCHLOROthiazide (MICROZIDE) 12.5 mg capsule 1 po qd 90 capsule 1     metFORMIN (GLUCOPHAGE XR) 500 MG 24 hr tablet Take 2 tablets (1,000 mg total) by mouth daily with supper. 180 tablet 1     valsartan-hydrochlorothiazide (DIOVAN-HCT) 160-25 mg per tablet Take 1 tablet every day by oral route. 90 tablet 1     No current facility-administered medications for this visit.         Allergies  Penicillins     Social History  Social History     Socioeconomic History     Marital status:      Spouse name: Not on file     Number of children: Not on file     Years of education: Not on file     Highest education level: Not on file   Social Needs     Financial resource strain: Not  on file     Food insecurity - worry: Not on file     Food insecurity - inability: Not on file     Transportation needs - medical: Not on file     Transportation needs - non-medical: Not on file   Occupational History     Not on file   Tobacco Use     Smoking status: Never Smoker     Smokeless tobacco: Never Used   Substance and Sexual Activity     Alcohol use: No     Drug use: No     Sexual activity: Not on file   Other Topics Concern     Not on file   Social History Narrative     Not on file        Family History  Family History   Problem Relation Age of Onset     Hypertension Mother      Breast cancer Mother      Bipolar disorder Father      No Medical Problems Son      No Medical Problems Son      No Medical Problems Sister      Diabetes Maternal Grandmother      Colon cancer Maternal Grandmother      Colon cancer Paternal Grandfather      Heart disease Neg Hx      Prostate cancer Neg Hx         Review of Systems:  Constitutional: Negative except as noted in HPI.   Eyes: Negative except as noted in HPI.   ENT: Negative except as noted in HPI.   Cardiovascular: Negative except as noted in HPI.   Respiratory: Negative except as noted in HPI.   Gastrointestinal: Negative except as noted in HPI.   Genitourinary: Negative except as noted in HPI.   Musculoskeletal: Negative except as noted in HPI.   Integumentary: Negative except as noted in HPI.   Neurological: Negative except as noted in HPI.   Psychiatric: Negative except as noted in HPI.   Endocrine: Negative except as noted in HPI.   Hematologic/Lymphatic: Negative except as noted in HPI.      STOP BANG 11/27/2018   Do you snore loudly (louder than talking or loud enough to be heard through closed doors)? 1   Do you often feel tired, fatigued, or sleepy during daytime? 1   Has anyone observed you stop breathing in your sleep? 1   Do you have or are you being treated for high blood pressure? 1   BMI more than 35 kg/m2 1   Age over 50 years old? 0   Neck  "circumference greater than 16 inches? 1   Gender male? 1   Total Score 7   Epworths Sleepiness Scale 11/27/2018   Sitting and reading 2   Watching TV 3   Sitting, inactive in a public place (e.g. a theatre or a meeting) 1   As a passenger in a car for an hour without a break 3   Lying down to rest in the afternoon when circumstances permit 2   Sitting and talking to someone 0   Sitting quietly after a lunch without alcohol 2   In a car, while stopped for a few minutes in traffic 1   Total score 14   Rooming 11/27/2018   Usual bedtime 10pm   Sleep Latency 10 minutes   Awakenings 3-4 times   Wake Up Time 6:15am   Weekends varies   Energy Drinks 0   Coffee 1 qd   Cola 0   Difficulty falling asleep No   Difficulty staying asleep Yes   Excessive daytime tiredness Yes   Excessive daytime sleepiness Yes   Dozing off while driving Yes   Shift Worker No   Sleep Walking? No   Sleep Talking? Yes   Kicking or punching? No   Restless legs symptoms No       Physical Exam:  /84   Pulse 75   Ht 6' 1\" (1.854 m)   Wt (!) 339 lb (153.8 kg)   SpO2 97%   BMI 44.73 kg/m    BMI:Body mass index is 44.73 kg/m .   GEN: NAD, morbidly obese  Head: Normocephalic.  EYES: PERRLA, EOMI  ENT: Oropharynx is clear, mallampatti class 4+ airway.   Nasal mucosa is moist without erythema  Neck : Thyroid is within normal limits.  CV: Regular rate and rhythm, S1 & S2 positive.  LUNGS: Bilateral breathsounds heard.   ABDOMEN: Positive bowel sounds in all quadrants, soft, no rebound or guarding  MUSCULOSKELETAL: Bilateral trace leg swelling  SKIN: warm, dry, no rashes  Neurological: Alert, oriented to time, place, and person.  Psych: normal mood, normal affect     Labs/Studies:     Lab Results   Component Value Date    WBC 6.0 10/04/2018    HGB 15.4 10/04/2018    HCT 46.1 10/04/2018    MCV 87 10/04/2018     10/04/2018         Chemistry        Component Value Date/Time     10/04/2018 0803    K 3.7 10/04/2018 0803     10/04/2018 " 0803    CO2 27 10/04/2018 0803    BUN 10 10/04/2018 0803    CREATININE 0.82 10/04/2018 0803     (H) 10/04/2018 0803        Component Value Date/Time    CALCIUM 9.2 10/04/2018 0803    ALKPHOS 76 10/04/2018 0803    AST 22 10/04/2018 0803    ALT 27 10/04/2018 0803    BILITOT 0.8 10/04/2018 0803            No results found for: FERRITIN  Lab Results   Component Value Date    TSH 1.90 10/04/2018         Assessment and Plan:  In summary Attila Rocha is a 48 y.o. year old male here for sleep disturbance.  1.  Hypersomnia   Mr. Attila Rocha has high risk for obstructive sleep apnea based on the history of hypersomnia, snoring and a crowded airway. I educated the patient on the underlying pathophysiology of obstructive sleep apnea. We reviewed the risks associated with sleep apnea, including increased cardiovascular risk and overall death. We talked about treatments briefly. I recommend getting an split-night nocturnal polysomnography. The patient should return to the clinic to discuss results and treatment option in a patient-centered approach.  2.  Snoring  3.  Other sleep disturbance  4.  Morbid obesity    Patient verbalized understanding of these issues, agrees with the plan and all questions were answered today. Patient was given an opportuntity to voice any other symptoms or concerns not listed above. Patient did not have any other symptoms or concerns.      Patient told to return in one week after the sleep study is interpreted.      Keaton Puente DO  Board Certified in Internal Medicine and Sleep Medicine  [facility].    (Note created with Dragon voice recognition and unintended spelling errors and word substitutions may occur)

## 2021-06-21 NOTE — PROGRESS NOTES
Assessment:     1. Acute recurrent maxillary sinusitis         Plan:     Although this sounds very much like a viral upper respiratory infection, the patient does have a significant history of recurrent sinusitis and is having sinus pain and pressure as well as copious postnasal drip.  I prescribed a azithromycin as that has worked well for him in the past.  Follow-up as needed.    Subjective:       48 y.o. male presents for evaluation of sinus symptoms. He reports onset of symptoms on Saturday when he started feeling tired and congested as well as sore throat. He reports feeling PND and some pressure in his frontal and maxillary sinuses bilaterally. He felt a bit feverish and chilled last night. He has been taking OTC Sudafed and Ibuprofen with some help.  Patient has a history of very recurrent sinus infections and previously was referred to an ENT where he had a consultation and was suggested to have sinus surgery.  The patient expresses that although it is somewhat early on in this illness, the natural history is for this to always turn into a severe bout of sinus infection and he is trying to chavez that off.      Reviewed: The following portions of the patient's history were reviewed and updated as appropriate: allergies, current medications, past family history, past medical history, past social history, past surgical history and problem list.    Review of Systems  Pertinent items are noted in HPI.        Objective:     /74 (Patient Site: Left Arm, Patient Position: Sitting, Cuff Size: Adult Large)   Pulse 82   Temp 98.5  F (36.9  C) (Oral)   Wt (!) 341 lb (154.7 kg)   BMI 44.99 kg/m    General appearance: alert, appears stated age and cooperative  Ears: normal TM's and external ear canals both ears  Throat: lips, mucosa, and tongue normal; teeth and gums normal  Lungs: clear to auscultation bilaterally  Heart: regular rate and rhythm, S1, S2 normal, no murmur, click, rub or gallop      This note has  been dictated using voice recognition software. Any grammatical or context distortions are unintentional and inherent to the software

## 2021-06-21 NOTE — PROGRESS NOTES
Assessment and Plan:     Class 3 severe obesity due to excess calories with serious comorbidity and body mass index (BMI) of 40.0 to 44.9 in adult (H)  48 y.o. year old male in clinic today to discuss treatment of the following conditions through diet and lifestyle modification and weight loss:  1. Class 3 severe obesity due to excess calories with serious comorbidity and body mass index (BMI) of 45.0 to 49.9 in adult (H)    2. Benign essential hypertension    3. Avitaminosis D    4. Metabolic syndrome    5. Prediabetes    6. Snores    7. Swelling      The patient's weight loss result since the last visit was successful as evidenced by weight loss and improvement of meal plans.   - no side effects from medications.   - improve menu planning and water consumption   - target 50-75 net carbs   - goals: lose weight.  More purposeful with food intake. Sustained weight loss.   - exercise: limited currently because of right side pain.   Consider PT referral.    - will sleep medicine later this month.       Continue supplements.    Recommend increasing movement throughout the day--sitting less, moving more.  Will increase activity over time to reach a goal of at least 150 minutes of moderate exercise each week.  Behavior modification:  Cognitive restructuring exercises, journaling stressors, triggers for food cravings.  Dietary Intervention:  Reduced calorie, reduced carbohydrates, whole food diet.  Greater than 50% of this 15 minute visit was spent in counseling regarding patient's obesity, medications, dietary, exercise, and behavior modification recommendations.    Subjective  Patient presents for treatment of chronic, comorbid conditions using intensive therapeutic lifestyle interventions including nutrition, physical activity, and behavior management.   - successes: he has been making changes.  Overall, feels well.  No emotional changes with diethylpropion.    - struggles: too much appetite suppression.  Emotional  "component of eating.     - exercise plan: \"not a whole lot.\"  Walking.  Limited by right leg pain.     - tracking/journaling: yes   - following nutritional plan: yes.  He has tried to target  net carbs.  Deviations from plan: infrequent to none.   - hunger: some comfort.   - medication side effects: none (perhaps excessive suppression of appetite)   - Barriers to losing weight:  Behavior:  inadequate exercise    Patient Active Problem List   Diagnosis     Prediabetes     Class 3 severe obesity due to excess calories with serious comorbidity and body mass index (BMI) of 40.0 to 44.9 in adult (H)     Benign essential hypertension     Snores     Metabolic syndrome     Avitaminosis D     Swelling       Current Outpatient Prescriptions on File Prior to Visit   Medication Sig Dispense Refill     ergocalciferol (ERGOCALCIFEROL) 50,000 unit capsule Take 1 capsule (50,000 Units total) by mouth once a week for 12 doses. 12 capsule 0     [DISCONTINUED] diethylpropion 75 mg TbER Take 75 mg by mouth daily. 30 each 0     [DISCONTINUED] hydroCHLOROthiazide (MICROZIDE) 12.5 mg capsule 1 po qd - due for visit in december       [DISCONTINUED] metFORMIN (GLUCOPHAGE XR) 500 MG 24 hr tablet Take 2 tablets (1,000 mg total) by mouth daily with supper. (take 1 tablet for the first 7 days) 60 tablet 2     [DISCONTINUED] valsartan-hydrochlorothiazide (DIOVAN-HCT) 160-25 mg per tablet Take 1 tablet every day by oral route.       No current facility-administered medications on file prior to visit.        Objective:  Vitals:    11/07/18 0746   BP: 128/74   Pulse: 74     Initial Weight: 360 lbs  Weight: 340 lb 9.6 oz (154.5 kg)  Weight loss from initial: 19.4  % Weight loss: 5.39 %  Body mass index is 44.94 kg/(m^2).  No LMP for male patient.  General:  Patient is alert, pleasant, no distress.  Patient is obese.    This note has been dictated using voice recognition software. Any grammatical or context distortions are unintentional and " inherent to the software

## 2021-06-21 NOTE — PROGRESS NOTES
I met with Attila Rocha at the request of Dr. Conner  to recheck his blood pressure.  Blood pressure medications on the MAR were reviewed with patient.    Patient has taken all medications as per usual regimen: Yes  Patient reports tolerating them without any issues or concerns: Yes    Vitals:    10/15/18 0755   BP: 130/72   Patient Site: Left Arm   Patient Position: Sitting   Cuff Size: Adult Large   Pulse: 78       Blood pressure was taken, previous encounter was reviewed, recorded blood pressure below 140/90.  Patient was discharged and the note will be sent to the provider for final review.     Thank you,  Rashmi Brown

## 2021-06-22 NOTE — PROGRESS NOTES
Order for Durable Medical Equipment was processed and equipment ordered.     DME provider: [employer]    Date Faxed: 12/31/18    Ordering Provider: Dr. Puente    Equipment ordered: APAP Machine    Fax Number: In house DME/[facility]

## 2021-06-22 NOTE — PROGRESS NOTES
"Assessment and Plan:     Class 3 severe obesity due to excess calories with serious comorbidity and body mass index (BMI) of 40.0 to 44.9 in adult (H)  48 y.o. year old male in clinic today to discuss treatment of the following conditions through diet and lifestyle modification and weight loss:  1. Class 3 severe obesity due to excess calories with serious comorbidity and body mass index (BMI) of 40.0 to 44.9 in adult (H)    2. Benign essential hypertension    3. Avitaminosis D    4. Metabolic syndrome    5. Prediabetes    6. Snores    7. Swelling      The patient's weight loss result since the last visit was successful as evidenced by weight loss despite holidays.    -continue metformin   -continue diethylpropion.  We discussed dietary goals.  The patient is consuming adequate nutrition.    Continue supplements.    Recommend increasing movement throughout the day--sitting less, moving more.  Will increase activity over time to reach a goal of at least 150 minutes of moderate exercise each week.  Behavior modification:  Cognitive restructuring exercises, journaling stressors, triggers for food cravings.  Dietary Intervention:  Reduced calorie, reduced carbohydrates, whole food diet.  Greater than 50% of this 15 minute visit was spent in counseling regarding patient's obesity, medications, dietary, exercise, and behavior modification recommendations.    Subjective  Patient presents for treatment of chronic, comorbid conditions using intensive therapeutic lifestyle interventions including nutrition, physical activity, and behavior management.   - successes: \"Survived the holidays\" being around cookies and food with [employer] life. Mom visited.  He is happy that he did as well as he did.     - struggles: numbness of leg comes and goes.  Less then a few months ago.     - exercise plan: walking but planning to add more.     - tracking/journaling: yes   - following nutritional plan: ?.  Deviations from plan: social   - " hunger: ok   - medication side effects: none   - Barriers to losing weight:  Behavior:  inadequate exercise, social calories    Patient Active Problem List   Diagnosis     Prediabetes     Class 3 severe obesity due to excess calories with serious comorbidity and body mass index (BMI) of 40.0 to 44.9 in adult (H)     Benign essential hypertension     Snores     Metabolic syndrome     Avitaminosis D     Swelling     Current Outpatient Medications on File Prior to Visit   Medication Sig Dispense Refill     hydroCHLOROthiazide (MICROZIDE) 12.5 mg capsule 1 po qd 90 capsule 1     metFORMIN (GLUCOPHAGE XR) 500 MG 24 hr tablet Take 2 tablets (1,000 mg total) by mouth daily with supper. 180 tablet 1     valsartan-hydrochlorothiazide (DIOVAN-HCT) 160-25 mg per tablet Take 1 tablet every day by oral route. 90 tablet 1     [DISCONTINUED] diethylpropion 75 mg TbER Take 75 mg by mouth daily. 30 each 0     No current facility-administered medications on file prior to visit.        Objective:  Vitals:    01/08/19 0804   BP: 126/80   Pulse: 80     Initial Weight: 360 lbs  Weight: (!) 330 lb (149.7 kg)  Weight loss from initial: 30  % Weight loss: 8.33 %    Body mass index is 43.54 kg/m .  No LMP for male patient.  General:  Patient is alert, pleasant, no distress.  Patient is obese.    This note has been dictated using voice recognition software. Any grammatical or context distortions are unintentional and inherent to the software

## 2021-06-22 NOTE — PROGRESS NOTES
"Assessment and Plan:     Class 3 severe obesity due to excess calories with serious comorbidity and body mass index (BMI) of 40.0 to 44.9 in adult (H)  48 y.o. year old male in clinic today to discuss treatment of the following conditions through diet and lifestyle modification and weight loss:  1. Class 3 severe obesity due to excess calories with serious comorbidity and body mass index (BMI) of 40.0 to 44.9 in adult (H)    2. Benign essential hypertension    3. Avitaminosis D    4. Metabolic syndrome    5. Prediabetes    6. Snores    7. Swelling      The patient's weight loss result since the last visit was successful as evidenced by ongoing weight loss.   - goal: make it through the holiday and navigate mother(s) visit   - continue diethylpropion   - continue to learn new tricks/tools.  Add variety.  Improved meals.    - continue metformin      Continue supplements.    Recommend increasing movement throughout the day--sitting less, moving more.  Will increase activity over time to reach a goal of at least 150 minutes of moderate exercise each week.  Behavior modification:  Cognitive restructuring exercises, journaling stressors, triggers for food cravings.  Dietary Intervention:  Reduced calorie, reduced carbohydrates, whole food diet.  Greater than 50% of this 15 minute visit was spent in counseling regarding patient's obesity, medications, dietary, exercise, and behavior modification recommendations.    Subjective  Patient presents for treatment of chronic, comorbid conditions using intensive therapeutic lifestyle interventions including nutrition, physical activity, and behavior management.   - successes: he has been more conscious of eating.  Met with [facility] a couple of weeks ago.  Helpful.  Sleep study scheduled next week.  He is unsure if he feels like he is dieting but rather suggests that he has been mindful.   - struggles: stress.  No stress eating. Sinus infection went away.     - exercise plan: \"not a " "whole lot.\"  Walking.  Limited by right leg pain.    - tracking/journaling: yes   - following nutritional plan: yes.  Deviations from plan: infrequent   - hunger: improved.   - medication side effects: none   - Barriers to losing weight:  Behavior:  inadequate exercise    Patient Active Problem List   Diagnosis     Prediabetes     Class 3 severe obesity due to excess calories with serious comorbidity and body mass index (BMI) of 40.0 to 44.9 in adult (H)     Benign essential hypertension     Snores     Metabolic syndrome     Avitaminosis D     Swelling       Current Outpatient Medications on File Prior to Visit   Medication Sig Dispense Refill     diethylpropion 75 mg TbER Take 75 mg by mouth daily. 30 each 0     ergocalciferol (ERGOCALCIFEROL) 50,000 unit capsule Take 1 capsule (50,000 Units total) by mouth once a week for 12 doses. 12 capsule 0     hydroCHLOROthiazide (MICROZIDE) 12.5 mg capsule 1 po qd 90 capsule 1     metFORMIN (GLUCOPHAGE XR) 500 MG 24 hr tablet Take 2 tablets (1,000 mg total) by mouth daily with supper. 180 tablet 1     valsartan-hydrochlorothiazide (DIOVAN-HCT) 160-25 mg per tablet Take 1 tablet every day by oral route. 90 tablet 1     No current facility-administered medications on file prior to visit.        Objective:  Vitals:    12/07/18 0746   BP: 118/76   Pulse: 78     Initial Weight: 360 lbs  Weight: (!) 332 lb (150.6 kg)  Weight loss from initial: 28  % Weight loss: 7.78 %    Body mass index is 43.8 kg/m .  No LMP for male patient.  General:  Patient is alert, pleasant, no distress.  Patient is obese.    This note has been dictated using voice recognition software. Any grammatical or context distortions are unintentional and inherent to the software  "

## 2021-06-22 NOTE — PROGRESS NOTES
Patient was offered choice of vendor and chose [facility].  Patient Attila Rocha was set up at [facility] on 1/4/19. Patient received a Resmed [id number] Auto. Pressures were set at 5-15.   Patient s ramp is 5 cm H2O for off and FLEX/EPR is EPR 2.  Patient received a Resmed Mask name: Airfit P10  pillow Size med, heated tubing and heated humidifier.    Pacee Her

## 2021-06-23 NOTE — PROGRESS NOTES
[facility]   Initial Evaluation    Patient Name: Attila Rocha  Date of evaluation: 1/24/2019  Visit number: 1/12  Referring Provider: Johnson Jeffrey MD  Referring Diagnosis:  Back pain, unspecified back location, unspecified back pain laterality, unspecified chronicity [M54.9]  - Primary       Numbness and tingling of foot [R20.0, R20.2]         Visit Diagnosis:     ICD-10-CM    1. Right leg numbness R20.0    2. Radicular leg pain M54.10    3. Generalized muscle weakness M62.81        Assessment:        Attila Rocha is a 48 y.o. male who presents to therapy today with chief complaints of R leg pain and numbness. Onset date of sx was March 2017 when pt was helping a friend move and he started having R LE numbness bother.  Pt reported h/o lumbar discectomy when he was 21 yo without any difficulty afterwards as well as R meniscal surgery 2014.  Pt also is currently in weight loss program to assist with obesity and prediabetes.   Pain symptoms are mild to severe in R hip/thigh and numbness into R 3rd-5th toes as well as mild low back pain.  Functional impairments include difficulty and pain with prolonged walking, sitting, standing and with increased activity like exercise.  Pt demo's signs and sx consistent with R LE S1 radicular sensation sx without myotomal weakness as well as decreased trunk and hip strength.     Pt. is appropriate for skilled PT intervention as outlined in the Plan of Care (POC).  Pt. is a good candidate for skilled PT services to improve pain levels and function.    Goals:  Pt. will demonstrate/verbalize independence in self-management of condition in : 12 weeks    Pt. will be able to walk : 60 minutes;with less pain;with less difficulty;for exercise/recreation;in 12 weeks    Pt will: be able to perform a strength training program 2-3x/week without increase in R LE sx in 12 weeks.      Patient's expectations/goals are realistic.    Barriers to Learning or Achieving  Goals:  No Barriers.       Plan / Patient Instructions:      Plan for next visit: Assess response to clamshell, bridge and SKTC nerve glide and piriformis stretch.   Attempt TA strength and assess response to full body strength exercise on sx.    Plan of Care:   Communication with: Referral Source  Patient Related Instruction: Nature of Condition;Treatment plan and rationale;Self Care instruction;Basis of treatment;Body mechanics;Posture;Precautions;Next steps;Expected outcome  Times per Week: 1  Number of Weeks: 12  Number of Visits: 12  Discharge Planning: when indicated  Precautions / Restrictions : none  Therapeutic Exercise: ROM;Stretching;Strengthening  Neuromuscular Reeducation: posture;TNE;core;other  Neuromuscular Re-education: neurodynamics  Manual Therapy: soft tissue mobilization;joint mobilization;muscle energy  Functional Training (ADL's): self care;ADL's    Treatment techniques, plan of care, and goals were discussed with the patient.  The patient agrees to the plan as outlined.  The plan of care is dynamic and will be modified on an ongoing basis.       Subjective:       Social information:   Occupation:   Work Status:Working full time    History of Present Illness:    Pt reports helping a friend move in 2017 and since then he hasn't been able to be as active. Numbness in R toes and then numbness and achy into R lateral thigh with minimal back pain.    Pt reports being in current weight loss program and has lost 30 lbs and this has helped some.  Pt reports R knee surgery in  for torn meniscus and L5 discectomy in college without leg sx.    Pt recently moved to [place] in August and did a lot of biking this fall and that felt pretty good.    Pain Ratin}  Pain rating at best: 0  Pain rating at worst: 8  Pain description: aching and numbness    Patient reports benefit from:  movement or exercise        Objective:      Patient Outcome Measures :    Modified Oswestry Low Back Pain  Disablity Questionnaire  in %: 18     Scores range from 0-100%, where a score of 0% represents minimal pain and maximal function. The minimal clinically important difference is a score reduction of 12%.    Precautions/Restrictions: None  Involved side: Right  Posture Observation:      General sitting posture is  normal.  Gait: Amb with R LE knee valgus and R foot pronation with R > L trendelenberg.    Palpation: Mostly non tender with B lumbosacral paraspinals palpation with mild hypomobility without pain L1-sacral P/A testing.    ROM: Trunk flex WNL without pain, ext WNL with min LBP, B SB WNL without sx    Strength: B LE grossly 5/5 except R hip abd 4/5, R hip ext 4/5 and L hip ext 4+/5 with mild LBP    Sensation: Pt reports intermittent diminished sensation R 3rd-5th toes and bottom of foot    Special tests: Positive repeated trunk extension for increased R toe sx    Negative R SLR for toe sx    Treatment Today      TREATMENT MINUTES COMMENTS   Evaluation 30 Lumbar   Self-care/ Home management     Manual therapy     Neuromuscular Re-education     Therapeutic Activity     Therapeutic Exercises 26 Discussed eval findings and POC. HEP instruction per Patient Instructions with written handout given.    Gait training     Modality__________________                Total 56    Blank areas are intentional and mean the treatment did not include these items.        PT Evaluation Code: (Please list factors)  Patient History/Comorbidities: h/o R LE sx ~2 yrs, obesity and in weight loss program  Examination: lumbar R LE  Clinical Presentation: stable  Clinical Decision Making: low    Patient History/  Comorbidities Examination  (body structures and functions, activity limitations, and/or participation restrictions) Clinical Presentation Clinical Decision Making (Complexity)   No documented Comorbidities or personal factors 1-2 Elements Stable and/or uncomplicated Low   1-2 documented comorbidities or personal factor 3 Elements  Evolving clinical presentation with changing characteristics Moderate   3-4 documented comorbidities or personal factors 4 or more Unstable and unpredictable High       Layne  Teece PT, DPT, OCS, CLT  1/24/2019  2:37 PM

## 2021-06-23 NOTE — PROGRESS NOTES
[facility] Daily Progress     Patient Name: Attila Rocha  Date: 2019  Visit #:   Referring Provider: Johnson Jeffrey MD  Referring Diagnosis:   Back pain, unspecified back location, unspecified back pain laterality, unspecified chronicity [M54.9]  - Primary       Numbness and tingling of foot [R20.0, R20.2]         Visit Diagnosis:     ICD-10-CM    1. Right leg numbness R20.0    2. Radicular leg pain M54.10    3. Generalized muscle weakness M62.81        Assessment:     Pt demo's mild increased R toe numbness after walking and heel raises.    Patient is benefitting from skilled physical therapy and is making steady progress toward functional goals.  Patient is appropriate to continue with skilled physical therapy intervention, as indicated by initial plan of care.    Goal Status:  Pt. will demonstrate/verbalize independence in self-management of condition in : 12 weeks  Pt. will be able to walk : 60 minutes;with less pain;with less difficulty;for exercise/recreation;in 12 weeks    Pt will: be able to perform a strength training program 2-3x/week without increase in R LE sx in 12 weeks.      Plan / Patient Education:     Continue with initial plan of care.    Assess response to heel raises, SLR, TA and SLS. Attempt progress with strength.      Subjective:     Pain Ratin  Pt reports he fell last week and landed on his R knee and hands and tweaked his R knee so squats with HEP are hard.  Pt reports the numbness in the R leg continues to feel random.    PT HEP going fine. Pt hasn't done much stair climbing with squats due to R knee.    Patient Outcome Measures:  Modified Oswestry Low Back Pain Disablity Questionnaire  in %: 18     Scores range from 0-100%, where a score of 0% represents minimal pain and maximal function. The minimal clinically important difference is a score reduction of 12%. FROM LAST VISIT    Objective:     Pt with mod difficulty with TA isometric needing verbal and  "tactile cues for proper technique    On treadmill - gait analysis - pt demo's R trendelenberg with positive toe sign and pronation with push off medial border 1st toe    Negative Hong's R knee  SLS x30\" with significant ankle strategy without LOB or pain  R knee ROM WNL without pain end range flex or ext    Treatment Today      TREATMENT MINUTES COMMENTS   Evaluation     Self-care/ Home management     Manual therapy     Neuromuscular Re-education 2 Assessed R LE SLS and added to home program per pt instructions.   Therapeutic Activity     Therapeutic Exercises 40 Assessed R knee ROM and special tests and discussed results. Performed and added to HEP per pt instructions with verbal and tactile cues for proper technique. Printed for home program.   Gait training 6 Performed gait analysis on treadmill and discussed results   Modality__________________                Total 48    Blank areas are intentional and mean the treatment did not include these items.       Layne Lorenzo PT, DPT, OCS, CLT  2/11/2019  8:03 AM        "

## 2021-06-23 NOTE — TELEPHONE ENCOUNTER
Controlled Substance Refill Request  Medication:   Requested Prescriptions     Pending Prescriptions Disp Refills     diethylpropion 75 mg TbER 30 each 0     Sig: Take 75 mg by mouth daily.     Date Last Fill: 1/8/19  [facility]   Submit electronically to pharmacy  Controlled Substance Agreement on File:   Encounter-Level CSA Scan Date:    There are no encounter-level csa scan date.       Last office visit: Last office visit pertaining to requested medication was 1/8/19.

## 2021-06-24 NOTE — PROGRESS NOTES
[facility] Daily Progress     Patient Name: Attila Rocha  Date: 2019  Visit #: 3/12  Referring Provider: Johnson Jeffrey MD  Referring Diagnosis:   Back pain, unspecified back location, unspecified back pain laterality, unspecified chronicity [M54.9]  - Primary       Numbness and tingling of foot [R20.0, R20.2]         Visit Diagnosis:     ICD-10-CM    1. Right leg numbness R20.0    2. Radicular leg pain M54.10    3. Generalized muscle weakness M62.81        Assessment:     Pt demo's improved hip/core strength with ability to progress HEP.    Patient is benefitting from skilled physical therapy and is making steady progress toward functional goals.  Patient is appropriate to continue with skilled physical therapy intervention, as indicated by initial plan of care.    Goal Status: on going  Pt. will demonstrate/verbalize independence in self-management of condition in : 12 weeks    Pt. will be able to walk : 60 minutes;with less pain;with less difficulty;for exercise/recreation;in 12 weeks    Pt will: be able to perform a strength training program 2-3x/week without increase in R LE sx in 12 weeks.      Plan / Patient Education:     Continue with initial plan of care.    Assess progress with HEP - pt to attempt return to gym and trial treadmill and elliptical.  Progress HEP as able for strength. Attempt standing nerve glide with double knee bend or seated tensioners.      Subjective:     Pain Ratin/10 today - just slight numbness in toes maybe 3/10 annoyance  Pt reports that he is bothered by how random the pain is - not sure of pattern. Seems like sitting might be 1 culprit but not everytime will it increase numbness.    PT HEP going fine. No increased numbness with current HEP.    Patient Outcome Measures:  Modified Oswestry Low Back Pain Disablity Questionnaire  in %: 18     Scores range from 0-100%, where a score of 0% represents minimal pain and maximal function. The minimal clinically  "important difference is a score reduction of 12%. FROM LAST VISIT    Objective:     Pt able to increase difficulty level with exercises    Mild difficulty with level pelvis with bridge and R LE lift    Some increase in R hip and lateral calf pain with monster walk towards end and afterwards    FROM LAST VISITS  On treadmill - gait analysis - pt demo's R trendelenberg with positive toe sign and pronation with push off medial border 1st toe    Negative Hong's R knee  SLS x30\" with significant ankle strategy without LOB or pain  R knee ROM WNL without pain end range flex or ext    Treatment Today      TREATMENT MINUTES COMMENTS   Evaluation     Self-care/ Home management     Manual therapy     Neuromuscular Re-education     Therapeutic Activity     Therapeutic Exercises 45 Discussed HEP and activity levels. Discussed adding gym in- pt to attempt treadmill or elliptical or walking outside for aerobic exercise.  Performed and added to HEP per pt instructions with verbal cues for proper technique. Printed for home program.   Gait training     Modality__________________                Total 45    Blank areas are intentional and mean the treatment did not include these items.       Layne Lorenzo PT, DPT, OCS, CLT  2/25/2019  8:03 AM      "

## 2021-06-24 NOTE — PROGRESS NOTES
"Assessment and Plan:     Class 3 severe obesity due to excess calories with serious comorbidity and body mass index (BMI) of 40.0 to 44.9 in adult (H)  48 y.o. year old male in clinic today to discuss treatment of the following conditions through diet and lifestyle modification and weight loss:  1. Class 3 severe obesity due to excess calories with serious comorbidity and body mass index (BMI) of 40.0 to 44.9 in adult (H)    2. Prediabetes    3. Benign essential hypertension    4. Snores    5. Metabolic syndrome    6. Avitaminosis D      The patient's weight loss result since the last visit was mixed based on weight stability.  He describes many positive behaviors and is planning to be more intentional.  We discussed a more aggressive medication approach and he declined topiramate and liraglutide. Continue metformin.  Check A1c.  Return to clinic in one month.    Continue supplements.    Recommend increasing movement throughout the day--sitting less, moving more.  Will increase activity over time to reach a goal of at least 150 minutes of moderate exercise each week.  Behavior modification:  Cognitive restructuring exercises, journaling stressors, triggers for food cravings.  Dietary Intervention:  Reduced calorie, reduced carbohydrates, whole food diet.  Greater than 50% of this 15 minute visit was spent in counseling regarding patient's obesity, medications, dietary, exercise, and behavior modification recommendations.    Subjective  Patient presents for treatment of chronic, comorbid conditions using intensive therapeutic lifestyle interventions including nutrition, physical activity, and behavior management.   - successes: more confident on food choices.  Better at planning.  More thoughtful with eating.   Less ad zach eating.     - struggles: emotional eating.  \"Filling a space\" sometimes with stress.  He does not think that he is depressed at this time.     - exercise plan: the patient started physical therapy. " " \"On the right track.\"  Planning to be more mobile.   - tracking/journaling: yes   - following nutritional plan: mostly.  Deviations from plan: yes   - hunger: ok   - medication side effects: none   - Barriers to losing weight:  Behavior:  inadequate exercise    - no lightheadedness.  No dizziness.    Patient Active Problem List   Diagnosis     Prediabetes     Class 3 severe obesity due to excess calories with serious comorbidity and body mass index (BMI) of 40.0 to 44.9 in adult (H)     Benign essential hypertension     Snores     Metabolic syndrome     Avitaminosis D     Swelling       Current Outpatient Medications on File Prior to Visit   Medication Sig Dispense Refill     diethylpropion 75 mg TbER Take 75 mg by mouth daily. 30 each 0     hydroCHLOROthiazide (MICROZIDE) 12.5 mg capsule 1 po qd 90 capsule 1     metFORMIN (GLUCOPHAGE XR) 500 MG 24 hr tablet Take 2 tablets (1,000 mg total) by mouth daily with supper. 180 tablet 1     valsartan-hydrochlorothiazide (DIOVAN-HCT) 160-25 mg per tablet Take 1 tablet every day by oral route. 90 tablet 1     No current facility-administered medications on file prior to visit.        Objective:  Vitals:    02/15/19 0755   BP: 132/76   Pulse: 82     Initial Weight: 360 lbs  Weight: (!) 329 lb (149.2 kg)  Weight loss from initial: 31  % Weight loss: 8.61 %    Body mass index is 43.41 kg/m .  No LMP for male patient.  General:  Patient is alert, pleasant, no distress.  Patient is obese.    This note has been dictated using voice recognition software. Any grammatical or context distortions are unintentional and inherent to the software  "

## 2021-06-25 NOTE — PROGRESS NOTES
Assessment/Plan:    Class 3 severe obesity due to excess calories with serious comorbidity and body mass index (BMI) of 40.0 to 44.9 in adult (H)  48 y.o. year old male in clinic today to discuss treatment of the following conditions through diet and lifestyle modification and weight loss:  1. Acute recurrent maxillary sinusitis    2. Class 3 severe obesity due to excess calories with serious comorbidity and body mass index (BMI) of 40.0 to 44.9 in adult (H)    3. Prediabetes    4. Metabolic syndrome      The patient's weight loss result since the last visit was mixed based on weight stability.    - night eating syndrome?  Discussed sertraline, topiramate.  Maintain current plan for the next month. Increase activity.     - continue diethylpropion    Follow-up: 1 months      SINUS INFECTION: This patient presents with 7 of days of symptoms consistent with sinusitis.  He has a history of bacterial rhinosinusitis.  He has been evaluated by a ENT in the past who recommended surgery.  The surgery was never performed as the patient moved out of [facility].  Since moving to [place], he has been more symptomatic.  Given the short duration of symptoms, I did recommend that he wait an additional couple of days before starting a course of antibiotic.  He is going hold off starting antibiotics over the next couple of days to see if his symptoms start to improve.  Consider referral to ENT for reevaluation regarding need for surgery.  Azithromycin has been helpful in the past.  This was prescribed with the above caveat.    Medications Ordered   Medications     azithromycin (ZITHROMAX Z-SOHAN) 250 MG tablet     Sig: Take 2 tablets (500 mg) on  Day 1,  followed by 1 tablet (250 mg) once daily on Days 2 through 5.     Dispense:  6 tablet     Refill:  0       Return in about 4 weeks (around 4/15/2019) for Weight loss follow-up.    Johnson Jeffrey MD  _______________________________    Chief Complaint   Patient presents with     Weight  Loss     last weight 329lbs     Facial Pain     x 8 days      Subjective: Attila Rocha is a 48 y.o. year old male who I have seen in clinic before who presents with the following acute complaint(s):    Sinus Infection Concerns:   - Facial pain: Yes.  Poor energy.  Decrease energy.  Maxillary - bilateral.  Duration: 7 days   - Fever: No   - Energy: low   - URI symptoms: nasal congestion   - palliative: OTC cough/cold product of patient's choice PRN and fluids and rest   - other factors: none   - previous sinus infections: Yes.  Most recently: November     Weight:    - was able to ski downhill.  Happy with performance of legs/knees.  Activity that he has not done in a while.  Overall, he felt good.  Meal plan: not great but not bad.  Some variation from plan.  Portion sizes are controlled.  Hungry in the evening.  No eating at nighttime but evening cravings/snacking.  He estimates that he eats 50% of calories from dinner until bedtime.         ROS: Complete review of systems obtained.  Pertinent items are listed above.     The following portions of the patient's history were reviewed and updated as appropriate: allergies, current medications, past medical history and problem list.     Objective:   /80 (Patient Site: Left Arm, Patient Position: Sitting, Cuff Size: Adult Large)   Pulse 84   Temp 98.6  F (37  C) (Oral)   Resp 20   Wt (!) 332 lb (150.6 kg)   SpO2 97% Comment: room air  BMI 43.80 kg/m    Gen.: No acute distress  HEENT: Tympanic membranes bilaterally are gray and glistening.  There is not bulging.  TMs are not immobile on insufflation.  No postauricular, submandibular, anterior cervical lymphadenopathy.  Posterior pharynx without erythema or tonsillar exudate.  Thereis axillary sinus tenderness. There is not frontal sinus tenderness.  Cardiac: Regular rate and rhythm, normal S1/S2, no murmurs or gallops  Respiratory: Clear to auscultation bilaterally.    Psych: normal affect  Skin: no  rashes    No results found for this or any previous visit (from the past 24 hour(s)).  No results found.    Additional History from Old Records Summarized (2): no  Decision to Obtain Records (1): no  Radiology Tests Summarized or Ordered (1): no  Labs Reviewed or Ordered (1): No   Medicine Test Summarized or Ordered (1): no  Independent Review of EKG or X-RAY(2 each): no    This note has been dictated using voice recognition software. Any grammatical or context distortions are unintentional and inherent to the software

## 2021-06-25 NOTE — TELEPHONE ENCOUNTER
Controlled Substance Refill Request  Medication:   Requested Prescriptions     Pending Prescriptions Disp Refills     diethylpropion 75 mg TbER 30 each 0     Sig: Take 75 mg by mouth daily.     Date Last Fill:1/30/19   [facility]    Submit electronically to pharmacy  Controlled Substance Agreement on File:   Encounter-Level CSA Scan Date:    There are no encounter-level csa scan date.       Last office visit: Last office visit pertaining to requested medication was 2/15/19.

## 2021-06-25 NOTE — TELEPHONE ENCOUNTER
Controlled Substance Refill Request  Medication:   Requested Prescriptions     Pending Prescriptions Disp Refills     diethylpropion 75 mg TbER 30 each 0     Sig: Take 75 mg by mouth daily.     Date Last Fill: 1/30/19  [facility]   Submit electronically to pharmacy    Controlled Substance Agreement on File:   Encounter-Level CSA Scan Date:    There are no encounter-level csa scan date.       Last office visit with primary: 2/15/2019

## 2021-06-30 ENCOUNTER — COMMUNICATION - HEALTHEAST (OUTPATIENT)
Dept: FAMILY MEDICINE | Facility: CLINIC | Age: 51
End: 2021-06-30

## 2021-06-30 NOTE — PROGRESS NOTES
"Progress Notes by Miriam Piedra AuD at 2/5/2021  9:20 AM     Author: Miriam Piedra AuD Service: -- Author Type: Audiologist    Filed: 2/5/2021  9:53 AM Encounter Date: 2/5/2021 Status: Signed    : Miriam Piedra AuD (Audiologist)       Audiology Report    Summary: Audiology visit completed. Please see audiogram below or in \"media\" tab for case history and results.     Plan:  The patient is returned to ENT for follow up. Return for retesting with ENT recommendation.     Josiane Parham, [facility]  Clinical Audiologist  [place] #[id number]           "

## 2021-07-03 NOTE — ADDENDUM NOTE
Addendum Note by Johnson Desai MD at 9/21/2020  5:08 PM     Author: Johnson Desai MD Service: -- Author Type: Physician    Filed: 9/21/2020  5:08 PM Encounter Date: 9/21/2020 Status: Signed    : Johnson Desai MD (Physician)    Addended by: JOHNSON DESAI on: 9/21/2020 05:08 PM        Modules accepted: Orders

## 2021-07-03 NOTE — ADDENDUM NOTE
Addendum Note by Johnson Desai MD at 10/8/2018  5:36 AM     Author: Johnson Desai MD Service: -- Author Type: Physician    Filed: 10/8/2018  5:36 AM Encounter Date: 10/4/2018 Status: Signed    : Johnson Desai MD (Physician)    Addended by: JOHNSON DESAI on: 10/8/2018 05:36 AM        Modules accepted: Orders

## 2021-07-03 NOTE — ADDENDUM NOTE
Addendum Note by Yarelis Brito MD at 11/26/2019  2:13 PM     Author: Yarelis Brito MD Service: -- Author Type: Physician    Filed: 11/26/2019  2:13 PM Date of Service: 11/26/2019  2:13 PM Status: Signed    : Yarelis Brito MD (Physician)       Addendum  created 11/26/19 1413 by Yarelis Brito MD    Clinical Note Signed

## 2021-07-03 NOTE — ADDENDUM NOTE
Addendum Note by Johnson Desai MD at 2/15/2019  8:00 AM     Author: Johnson Desai MD Service: -- Author Type: Physician    Filed: 2/19/2019 12:59 PM Encounter Date: 2/15/2019 Status: Signed    : Johnson Desai MD (Physician)    Addended by: JOHNSON DESAI on: 2/19/2019 12:59 PM        Modules accepted: Orders

## 2021-07-04 NOTE — TELEPHONE ENCOUNTER
Telephone Encounter by Betzy Hines at 6/30/2021 10:44 AM     Author: Betzy Hines Service: -- Author Type: --    Filed: 6/30/2021 10:45 AM Encounter Date: 6/30/2021 Status: Signed    : Betzy Hines       [facility] PA team  [phone]  Pool:  PA MED ([id number])          PA has been initiated.       PA form completed and faxed insurance via Cover My Meds     Key:  [id number]     Medication:  SAXENDA    Insurance:  [employer]        Response will be received via fax and may take up to 5-10 business days depending on plan

## 2021-07-04 NOTE — TELEPHONE ENCOUNTER
Telephone Encounter by Rashmi Brown LPN at 6/30/2021  7:53 AM     Author: Rashmi Brown LPN Service: -- Author Type: Licensed Nurse    Filed: 6/30/2021  8:00 AM Encounter Date: 6/30/2021 Status: Signed    : Rashmi Brown LPN (Licensed Nurse)       Prior Authorization Request  Whos requesting:  Pharmacy  Pharmacy Name and Location: [facility]   Medication Name: SAXENDA 3 mg/0.5 mL (18 mg/3 mL) PnIj  Insurance Plan: [id number]  Insurance Member ID Number:  [id number]  CoverMyMeds Key: N/A  Informed patient that prior authorizations can take up to 10 business days for response:   Yes  Okay to leave a detailed message: Yes

## 2021-07-04 NOTE — TELEPHONE ENCOUNTER
Telephone Encounter by Betzy Hines at 6/30/2021  3:14 PM     Author: Betzy Hines Service: -- Author Type: --    Filed: 6/30/2021  3:15 PM Encounter Date: 6/30/2021 Status: Signed    : Betzy Hines APPROVED:    Approval start date: 5/30/2021  Approval end date:  6/30/2022    Pharmacy has been notified of approval and will contact patient when medication is ready for pickup.

## 2021-07-15 ENCOUNTER — TELEPHONE (OUTPATIENT)
Dept: FAMILY MEDICINE | Facility: CLINIC | Age: 51
End: 2021-07-15

## 2021-07-15 NOTE — TELEPHONE ENCOUNTER
PA Initiation    Medication: Saxenda   Insurance Company: [employer] - Phone [phone] Fax [phone]  Pharmacy Filling the Rx:    Filling Pharmacy Phone: [phone]  Filling Pharmacy Fax:    Start Date: 7/15/2021

## 2021-07-15 NOTE — TELEPHONE ENCOUNTER
Prior Authorization Request  Who s requesting:  Pharmacy   [facility]   Medication Name: saxenda   Insurance Plan: preferred 1  Insurance Member ID Number: [id number]  CoverMyMeds Key: n/a   Informed patient that prior authorizations can take up to 10 business days for response  Yes  Okay to leave a detailed messag yes

## 2021-07-19 NOTE — TELEPHONE ENCOUNTER
PRIOR AUTHORIZATION DENIED    Medication: Saxenda     Denial Date: 7/19/2021    Denial Rational: Medications used to aid in weight loss are excluded from plan.  Patient may get medication but will be responsible for cost.

## 2021-07-20 ENCOUNTER — TRANSFERRED RECORDS (OUTPATIENT)
Dept: HEALTH INFORMATION MANAGEMENT | Facility: CLINIC | Age: 51
End: 2021-07-20

## 2021-07-20 NOTE — TELEPHONE ENCOUNTER
"Called pt and scheduled a video visit, he will run out of medication and wants to know does he have to \"wean off\"  Saxenda or can he just stop?    Rashmi Brown LPN  7/20/2021  8:35 AM   " Patient expressed no known problems or needs

## 2021-07-20 NOTE — TELEPHONE ENCOUNTER
There is not a specific weaning schedule.  He could take decreasing doses with the remaining supply that he has so that his hunger returns more slowly.  It depends on how much of the medication he has.

## 2021-07-23 ENCOUNTER — RECORDS - HEALTHEAST (OUTPATIENT)
Dept: ADMINISTRATIVE | Facility: OTHER | Age: 51
End: 2021-07-23

## 2021-07-23 ENCOUNTER — OFFICE VISIT (OUTPATIENT)
Dept: OTOLARYNGOLOGY | Facility: CLINIC | Age: 51
End: 2021-07-23
Payer: COMMERCIAL

## 2021-07-23 DIAGNOSIS — H61.21 KERATIN DEBRIS OF EAR CANAL, RIGHT: ICD-10-CM

## 2021-07-23 DIAGNOSIS — B36.9 OTITIS EXTERNA, FUNGAL, RIGHT EAR: Primary | ICD-10-CM

## 2021-07-23 DIAGNOSIS — H62.41 OTITIS EXTERNA, FUNGAL, RIGHT EAR: Primary | ICD-10-CM

## 2021-07-23 PROCEDURE — 99213 OFFICE O/P EST LOW 20 MIN: CPT | Mod: 25 | Performed by: OTOLARYNGOLOGY

## 2021-07-23 PROCEDURE — 92504 EAR MICROSCOPY EXAMINATION: CPT | Performed by: OTOLARYNGOLOGY

## 2021-07-23 NOTE — PROGRESS NOTES
CHIEF COMPLAINT:  Recheck      HISTORY OF PRESENT ILLNESS    Attila was seen in follow up for recheck of RIGHT ear after 3 weeks of CSF otic powder.  Care was able to use this yet as CSF powder as instructed.  He has 7 more days to go.  Is about getting ready to leave for a vacation out [place] and will be leaving Sunday.  He will be gone several weeks.  No pain or otorrhea in the right ear.           REVIEW OF SYSTEMS    Review of Systems: a 10-system review is reviewed at this encounter.  See scanned document.     Penicillins     PHYSICAL EXAM:        HEAD: Normal appearance and symmetry:  No cutaneous lesions.      EARS:   Auricles normal    EAR DEBRIDEMENT:    Examination of the right ear and the biomicroscope shows some thick cheesy debris along the the medial aspect of the canal and in contact with the tympanic membrane.  This is debrided with a #5 suction.  Gentian violet is then instilled into the right external auditory canal       NOSE:    Dorsum:   straight       ORAL CAVITY/OROPHARYNX:    Lips:  Normal.     NECK:  Trachea:  midline       NEURO:   Alert and Oriented    GAIT AND STATION:  normal     RESPIRATORY:   Symmetry and Respiratory effort    PSYCH:   normal mood and affect    SKIN:  warm and dry         IMPRESSION:   Encounter Diagnosis   Name Primary?     Otitis externa, fungal, right ear Yes              RECOMMENDATIONS:    Right-sided fungal otitis externa that is improved but not completely eliminated.  Patient will keep ear dry.  We may need to put him on additional course of CSF/HC otic powder.  Will recheck in 1 month.  All questions were answered.  He is agreeable to plan of care

## 2021-07-23 NOTE — LETTER
7/23/2021         RE: Attila Rocha  [address] [facility] [address]        Dear Colleague,    Thank you for referring your patient, Attila Rocha, to the [facility]. Please see a copy of my visit note below.    CHIEF COMPLAINT:  Recheck      HISTORY OF PRESENT ILLNESS    Attila was seen in follow up for recheck of RIGHT ear after 3 weeks of CSF otic powder.  Care was able to use this yet as CSF powder as instructed.  He has 7 more days to go.  Is about getting ready to leave for a vacation out [place] and will be leaving Sunday.  He will be gone several weeks.  No pain or otorrhea in the right ear.           REVIEW OF SYSTEMS    Review of Systems: a 10-system review is reviewed at this encounter.  See scanned document.     Penicillins     PHYSICAL EXAM:        HEAD: Normal appearance and symmetry:  No cutaneous lesions.      EARS:   Auricles normal    EAR DEBRIDEMENT:    Examination of the right ear and the biomicroscope shows some thick cheesy debris along the the medial aspect of the canal and in contact with the tympanic membrane.  This is debrided with a #5 suction.  Gentian violet is then instilled into the right external auditory canal       NOSE:    Dorsum:   straight       ORAL CAVITY/OROPHARYNX:    Lips:  Normal.     NECK:  Trachea:  midline       NEURO:   Alert and Oriented    GAIT AND STATION:  normal     RESPIRATORY:   Symmetry and Respiratory effort    PSYCH:   normal mood and affect    SKIN:  warm and dry         IMPRESSION:   Encounter Diagnosis   Name Primary?     Otitis externa, fungal, right ear Yes              RECOMMENDATIONS:    Right-sided fungal otitis externa that is improved but not completely eliminated.  Patient will keep ear dry.  We may need to put him on additional course of CSF/HC otic powder.  Will recheck in 1 month.  All questions were answered.  He is agreeable to plan of care        Again, thank you for allowing me to participate in the care of  your patient.        Sincerely,        Gurpreet Garcia MD

## 2021-08-06 NOTE — TELEPHONE ENCOUNTER
Prior Authorization Request  Who s requesting:  Pharmacy  [facility]  Medication Name: Saxenda 18 mg/3 ml  Insurance Plan: [employer]  Insurance Member ID Number:  [id number]  CoverMyMeds Key: [id number]  Informed patient that prior authorizations can take up to 10 business days for response:   NA  Okay to leave a detailed message: No

## 2021-08-09 ENCOUNTER — VIRTUAL VISIT (OUTPATIENT)
Dept: FAMILY MEDICINE | Facility: CLINIC | Age: 51
End: 2021-08-09
Payer: COMMERCIAL

## 2021-08-09 DIAGNOSIS — R73.03 PREDIABETES: ICD-10-CM

## 2021-08-09 DIAGNOSIS — I10 BENIGN ESSENTIAL HYPERTENSION: ICD-10-CM

## 2021-08-09 DIAGNOSIS — E66.813 CLASS 3 SEVERE OBESITY DUE TO EXCESS CALORIES WITH SERIOUS COMORBIDITY AND BODY MASS INDEX (BMI) OF 40.0 TO 44.9 IN ADULT (H): Primary | ICD-10-CM

## 2021-08-09 DIAGNOSIS — R06.83 SNORES: ICD-10-CM

## 2021-08-09 DIAGNOSIS — E66.01 CLASS 3 SEVERE OBESITY DUE TO EXCESS CALORIES WITH SERIOUS COMORBIDITY AND BODY MASS INDEX (BMI) OF 40.0 TO 44.9 IN ADULT (H): Primary | ICD-10-CM

## 2021-08-09 DIAGNOSIS — E88.810 METABOLIC SYNDROME: ICD-10-CM

## 2021-08-09 PROBLEM — L02.214 CUTANEOUS ABSCESS OF GROIN: Status: RESOLVED | Noted: 2019-12-06 | Resolved: 2021-08-09

## 2021-08-09 PROBLEM — S93.691A RUPTURE OF PLANTAR FASCIA OF RIGHT FOOT: Status: RESOLVED | Noted: 2019-09-25 | Resolved: 2021-08-09

## 2021-08-09 PROBLEM — U07.1 COVID-19: Status: RESOLVED | Noted: 2021-01-22 | Resolved: 2021-08-09

## 2021-08-09 PROCEDURE — 99213 OFFICE O/P EST LOW 20 MIN: CPT | Mod: 95 | Performed by: FAMILY MEDICINE

## 2021-08-09 RX ORDER — CHOLECALCIFEROL (VITAMIN D3) 50 MCG
1 TABLET ORAL DAILY
COMMUNITY
End: 2023-03-28

## 2021-08-09 NOTE — ASSESSMENT & PLAN NOTE
50 year old year old male in clinic today to discuss treatment of the following conditions through diet and lifestyle modification and weight loss:  1. Class 3 severe obesity due to excess calories with serious comorbidity and body mass index (BMI) of 40.0 to 44.9 in adult (H)    2. Prediabetes    3. Benign essential hypertension    4. Snores    5. Metabolic syndrome      The patient's weight loss result since the last visit was successful based on weight stability.  He has continues to restrict carbs and calories.  Not on a medication (stopped from cost).     - continue metformin.   - soda: on and off.  Continue to monitor   - no other meds for now.    - nutritional restriction.     - follow-up in 2-3 months.

## 2021-08-09 NOTE — PROGRESS NOTES
"Type of service:  Video Visit    Attila Rocha is a 50 year old male who is being evaluated via a billable video visit.      How would you like to obtain your AVS? MyChart  If dropped from the video visit, the video invitation should be resent by: Text to cell phone: [phone]   Will anyone else be joining your video visit? No    Video Start Time: 8:44 AM  Video End Time (time video stopped): 8:58 AM  Originating Location (pt. Location): Home  Distant Location (provider location):  [facility]   Platform used for Video Visit: [employer]    Assessment and Plan:     Class 3 severe obesity due to excess calories with serious comorbidity and body mass index (BMI) of 40.0 to 44.9 in adult (H)  50 year old year old male in clinic today to discuss treatment of the following conditions through diet and lifestyle modification and weight loss:  1. Class 3 severe obesity due to excess calories with serious comorbidity and body mass index (BMI) of 40.0 to 44.9 in adult (H)    2. Prediabetes    3. Benign essential hypertension    4. Snores    5. Metabolic syndrome      The patient's weight loss result since the last visit was successful based on weight stability.  He has continues to restrict carbs and calories.  Not on a medication (stopped from cost).     - continue metformin.   - soda: on and off.  Continue to monitor   - no other meds for now.    - nutritional restriction.     - follow-up in 2-3 months.     Return in about 2 months (around 10/9/2021) for Annual exam, Weight loss follow-up visit.    Chief Complaint   Patient presents with     Medication Problem     pt saxenda is no longer covered by his insurance      Subjective  Patient presents for treatment of chronic, comorbid conditions using intensive therapeutic lifestyle interventions including nutrition, physical activity, and behavior management.   - successes: \"feeling pretty good.\"  Continues to have goal of losing more weight.  Ongoing " "back pain.  \"manageable\" but continues to limit movement.     - struggles: now off saxenda.  He has been off the medication for three weeks.  Unsure if there was a difference in appetite since he was physically active (hiking, camping).  \"I feel good.\"   - continues to have goal of losing more weight.  Goal: 275 lbs.     - he found that he had appetite suppression on the medication.  Now he can eat fibrous veggies.     - weight stable over the last year.   - energy okay.  - continues to limit carbs.  \"Foods that are filling.\"  He still thinks of calories.      Reviewed history:  Allergies   Problems  Med Hx  Surg Hx  Fam Hx          Objective:  There were no vitals taken for this visit.  Change from start of program:       There is no height or weight on file to calculate BMI.  No LMP for male patient.  Physical Exam  Nursing note reviewed.   Constitutional:       General: He is not in acute distress.     Appearance: Normal appearance. He is not ill-appearing.   HENT:      Head: Normocephalic and atraumatic.   Eyes:      Extraocular Movements: Extraocular movements intact.      Conjunctiva/sclera: Conjunctivae normal.   Pulmonary:      Effort: Pulmonary effort is normal.   Neurological:      Mental Status: He is alert and oriented to person, place, and time.   Psychiatric:         Attention and Perception: Attention normal.         Mood and Affect: Mood normal.         Speech: Speech normal.         Thought Content: Thought content normal.         This note has been dictated using voice recognition software. Any grammatical or context distortions are unintentional and inherent to the software    "

## 2021-08-15 ENCOUNTER — HEALTH MAINTENANCE LETTER (OUTPATIENT)
Age: 51
End: 2021-08-15

## 2021-08-20 ENCOUNTER — OFFICE VISIT (OUTPATIENT)
Dept: OTOLARYNGOLOGY | Facility: CLINIC | Age: 51
End: 2021-08-20
Payer: COMMERCIAL

## 2021-08-20 DIAGNOSIS — H62.41 OTITIS EXTERNA, FUNGAL, RIGHT EAR: Primary | ICD-10-CM

## 2021-08-20 DIAGNOSIS — B36.9 OTITIS EXTERNA, FUNGAL, RIGHT EAR: Primary | ICD-10-CM

## 2021-08-20 PROCEDURE — 99213 OFFICE O/P EST LOW 20 MIN: CPT | Performed by: OTOLARYNGOLOGY

## 2021-08-20 NOTE — PROGRESS NOTES
"CHIEF COMPLAINT:  Recheck      HISTORY OF PRESENT ILLNESS    Attila was seen in follow up for rehceck of his right ear.  No pain or drainage.  Just returned from [facility].   Ear feels \"great\".   No new concerns.             REVIEW OF SYSTEMS    Review of Systems: a 10-system review is reviewed at this encounter.  See scanned document.     Penicillins     PHYSICAL EXAM:        HEAD: Normal appearance and symmetry:  No cutaneous lesions.      EARS:   Auricles normal    Both EACS and TMs normal, some residual GV on the right EAC     NOSE:    Dorsum:   straight       ORAL CAVITY/OROPHARYNX:    Lips:  Normal.     NECK:  Trachea:  midline       NEURO:   Alert and Oriented    GAIT AND STATION:  normal     RESPIRATORY:   Symmetry and Respiratory effort    PSYCH:   normal mood and affect    SKIN:  warm and dry         IMPRESSION:   Encounter Diagnosis   Name Primary?     Otitis externa, fungal, right ear Yes              RECOMMENDATIONS:    Keep ear dry (cotton ball with vaseline when showing)  Return visit 3 months     "

## 2021-08-20 NOTE — LETTER
"    8/20/2021         RE: Attila Rocha  [address] [facility] [address]        Dear Colleague,    Thank you for referring your patient, Attial Rocha, to the [facility]. Please see a copy of my visit note below.    CHIEF COMPLAINT:  Recheck      HISTORY OF PRESENT ILLNESS    Attila was seen in follow up for rehceck of his right ear.  No pain or drainage.  Just returned from [facility].   Ear feels \"great\".   No new concerns.             REVIEW OF SYSTEMS    Review of Systems: a 10-system review is reviewed at this encounter.  See scanned document.     Penicillins     PHYSICAL EXAM:        HEAD: Normal appearance and symmetry:  No cutaneous lesions.      EARS:   Auricles normal    Both EACS and TMs normal, some residual GV on the right EAC     NOSE:    Dorsum:   straight       ORAL CAVITY/OROPHARYNX:    Lips:  Normal.     NECK:  Trachea:  midline       NEURO:   Alert and Oriented    GAIT AND STATION:  normal     RESPIRATORY:   Symmetry and Respiratory effort    PSYCH:   normal mood and affect    SKIN:  warm and dry         IMPRESSION:   Encounter Diagnosis   Name Primary?     Otitis externa, fungal, right ear Yes              RECOMMENDATIONS:    Keep ear dry (cotton ball with vaseline when showing)  Return visit 3 months         Again, thank you for allowing me to participate in the care of your patient.        Sincerely,        Gurpreet Garcia MD    "

## 2021-09-12 DIAGNOSIS — I10 BENIGN ESSENTIAL HYPERTENSION: Primary | ICD-10-CM

## 2021-09-12 RX ORDER — VALSARTAN AND HYDROCHLOROTHIAZIDE 160; 25 MG/1; MG/1
TABLET ORAL
Qty: 90 TABLET | Refills: 1 | Status: SHIPPED | OUTPATIENT
Start: 2021-09-12 | End: 2022-01-10

## 2021-09-12 NOTE — TELEPHONE ENCOUNTER
"  Disp Refills Start End LUANNE    valsartan-hydrochlorothiazide (DIOVAN-HCT) 160-25 mg per tablet 90 tablet 2 12/16/2020  No   Sig: TAKE ONE TABLET BY MOUTH ONE TIME DAILY   Sent to pharmacy as: valsartan 160 mg-hydrochlorothiazide 25 mg tablet (DIOVAN-HCT)   E-Prescribing Status: Receipt confirmed by pharmacy (12/16/2020 10:41 AM CST)       Last Written Prescription Date:  12/16/20  Last Fill Quantity: 90,  # refills: 2   Last office visit provider:  8/9/21     Requested Prescriptions   Pending Prescriptions Disp Refills     valsartan-hydrochlorothiazide (DIOVAN HCT) 160-25 MG tablet [Pharmacy Med Name: Valsartan-hydroCHLOROthiazide Oral Tablet 160-25 MG] 90 tablet 0     Sig: TAKE ONE TABLET BY MOUTH ONE TIME DAILY       Angiotensin-II Receptors Passed - 9/12/2021  2:00 AM        Passed - Last blood pressure under 140/90 in past 12 months     BP Readings from Last 3 Encounters:   01/14/21 137/77   09/24/20 130/72   09/18/20 132/80                 Passed - Recent (12 mo) or future (30 days) visit within the authorizing provider's specialty     Patient has had an office visit with the authorizing provider or a provider within the authorizing providers department within the previous 12 mos or has a future within next 30 days. See \"Patient Info\" tab in inbasket, or \"Choose Columns\" in Meds & Orders section of the refill encounter.              Passed - Medication is active on med list        Passed - Patient is age 18 or older        Passed - Normal serum creatinine on file in past 12 months     Recent Labs   Lab Test 01/26/21  0735   CR 0.82       Ok to refill medication if creatinine is low          Passed - Normal serum potassium on file in past 12 months     Recent Labs   Lab Test 01/26/21  0735   POTASSIUM 3.7  3.7                   Diuretics (Including Combos) Protocol Passed - 9/12/2021  2:00 AM        Passed - Blood pressure under 140/90 in past 12 months     BP Readings from Last 3 Encounters:   01/14/21 137/77 " "  09/24/20 130/72   09/18/20 132/80                 Passed - Recent (12 mo) or future (30 days) visit within the authorizing provider's specialty     Patient has had an office visit with the authorizing provider or a provider within the authorizing providers department within the previous 12 mos or has a future within next 30 days. See \"Patient Info\" tab in inbasket, or \"Choose Columns\" in Meds & Orders section of the refill encounter.              Passed - Medication is active on med list        Passed - Patient is age 18 or older        Passed - Normal serum creatinine on file in past 12 months     Recent Labs   Lab Test 01/26/21  0735   CR 0.82              Passed - Normal serum potassium on file in past 12 months     Recent Labs   Lab Test 01/26/21  0735   POTASSIUM 3.7  3.7                    Passed - Normal serum sodium on file in past 12 months     Recent Labs   Lab Test 01/26/21  0735                      Adalberto Hargrove RN 09/12/21 11:48 AM  "

## 2021-10-10 ENCOUNTER — HEALTH MAINTENANCE LETTER (OUTPATIENT)
Age: 51
End: 2021-10-10

## 2021-12-07 ENCOUNTER — VIRTUAL VISIT (OUTPATIENT)
Dept: URGENT CARE | Facility: CLINIC | Age: 51
End: 2021-12-07
Payer: COMMERCIAL

## 2021-12-07 DIAGNOSIS — J32.9 OTHER SINUSITIS, UNSPECIFIED CHRONICITY: Primary | ICD-10-CM

## 2021-12-07 PROCEDURE — 99213 OFFICE O/P EST LOW 20 MIN: CPT | Mod: 95 | Performed by: EMERGENCY MEDICINE

## 2021-12-07 RX ORDER — DOXYCYCLINE HYCLATE 100 MG
100 TABLET ORAL 2 TIMES DAILY
Qty: 14 TABLET | Refills: 0 | Status: SHIPPED | OUTPATIENT
Start: 2021-12-07 | End: 2021-12-14

## 2021-12-07 NOTE — PROGRESS NOTES
Video appointment:    Start time: 1:58 PM  Stop time: 2:01 PM    Assessment: Persistent URI symptoms for 10-11 days. Symptoms now worsening with increasing congestion and sinus region and discolored phlegm up brownish color. Test negative for Covid 1 week ago.    Plan: We'll treat sinusitis with doxycycline. Follow-up as needed.    CHIEF COMPLAINT: Possible sinus infection      HPI: Patient is a 51-year-old male whose been ill for 1011 days. He initially felt he had cold symptoms but they have not improved. Over the last several days has had increasing facial pressure and congestion as well as a discoloration to his phlegm. Symptoms feel consistent with previous sinus infection. Patient tested negative for Covid 1 week ago while actively ill.      ROS: See HPI otherwise normal.    Allergies   Allergen Reactions     Penicillins Other (See Comments) and Rash     Rash         Current Outpatient Medications   Medication Sig Dispense Refill     doxycycline hyclate (VIBRA-TABS) 100 MG tablet Take 1 tablet (100 mg) by mouth 2 times daily for 7 days 14 tablet 0     hydrochlorothiazide (MICROZIDE) 12.5 MG capsule hydrochlorothiazide 12.5 mg capsule   1 po qd - due for visit in december       metFORMIN (GLUCOPHAGE-XR) 500 MG 24 hr tablet Take 1,000 mg by mouth daily        valsartan-hydrochlorothiazide (DIOVAN HCT) 160-25 MG tablet TAKE ONE TABLET BY MOUTH ONE TIME DAILY  90 tablet 1     vitamin D3 (CHOLECALCIFEROL) 50 mcg (2000 units) tablet Take 1 tablet by mouth daily           PE: No acute distress on video visit. Alert. No dysphonia. Nondyspneic appearing.        TREATMENT: None.      ASSESSMENT: Persistent upper respiratory symptoms now consistent with bacterial sinusitis.      DIAGNOSIS: Sinusitis.      PLAN: Doxycycline as instructed. Symptomatic instructions discussed. Follow-up 1 week if no better, sooner if worse.    Time: 9 minutes

## 2021-12-15 ENCOUNTER — IMMUNIZATION (OUTPATIENT)
Dept: NURSING | Facility: CLINIC | Age: 51
End: 2021-12-15
Payer: COMMERCIAL

## 2021-12-15 PROCEDURE — 91306 COVID-19,PF,MODERNA (18+ YRS BOOSTER .25ML): CPT

## 2021-12-15 PROCEDURE — 0064A COVID-19,PF,MODERNA (18+ YRS BOOSTER .25ML): CPT

## 2022-01-10 ENCOUNTER — VIRTUAL VISIT (OUTPATIENT)
Dept: FAMILY MEDICINE | Facility: CLINIC | Age: 52
End: 2022-01-10
Payer: COMMERCIAL

## 2022-01-10 DIAGNOSIS — J40 BRONCHITIS: ICD-10-CM

## 2022-01-10 DIAGNOSIS — I10 BENIGN ESSENTIAL HYPERTENSION: Primary | ICD-10-CM

## 2022-01-10 PROCEDURE — 99214 OFFICE O/P EST MOD 30 MIN: CPT | Mod: 95 | Performed by: FAMILY MEDICINE

## 2022-01-10 RX ORDER — VALSARTAN AND HYDROCHLOROTHIAZIDE 160; 25 MG/1; MG/1
1 TABLET ORAL DAILY
Qty: 90 TABLET | Refills: 1 | Status: SHIPPED | OUTPATIENT
Start: 2022-01-10 | End: 2022-10-04

## 2022-01-10 RX ORDER — CODEINE PHOSPHATE/GUAIFENESIN 10-100MG/5
5 LIQUID (ML) ORAL EVERY 4 HOURS PRN
Qty: 75 ML | Refills: 0 | Status: SHIPPED | OUTPATIENT
Start: 2022-01-10 | End: 2022-03-18

## 2022-01-10 RX ORDER — BENZONATATE 200 MG/1
200 CAPSULE ORAL 3 TIMES DAILY PRN
Qty: 30 CAPSULE | Refills: 0 | Status: SHIPPED | OUTPATIENT
Start: 2022-01-10 | End: 2022-03-18

## 2022-01-10 RX ORDER — AZITHROMYCIN 250 MG/1
TABLET, FILM COATED ORAL
Qty: 6 TABLET | Refills: 0 | Status: SHIPPED | OUTPATIENT
Start: 2022-01-10 | End: 2022-01-15

## 2022-01-10 NOTE — PROGRESS NOTES
"Attila is a 51 year old who is being evaluated via a billable video visit.      How would you like to obtain your AVS? MyChart  If the video visit is dropped, the invitation should be resent by: Send to e-mail at: [email or web address]  Will anyone else be joining your video visit? No  Video Start Time: 12:03 PM    Assessment/Plan:    Attila was seen today for sinus problem and cough.    Diagnoses and all orders for this visit:    Benign essential hypertension/Bronchitis: Patient describes symptoms of bronchitis.  Duration is-10 days.  Discussed differential including COVID-19, bacterial sinusitis, pneumonia.  Given his symptoms as well as his history of sinus infections I think it is reasonable to cover with antibiotics for respiratory pathogens.  We discussed the difference between antibiotics focusing on bacterial bronchitis/pertussis/pneumonia versus bacterial sinus infection.  Azithromycin sent to pharmacy.  Symptomatic treatment with codeine/guaifenesin (no history of narcotic abuse) and Tessalon Perles.  No history of asthma although he does describe symptoms of feeling \"tight\" in the chest.  This is a new phenomenon since he had COVID-pneumonia in 2020.  Albuterol was helpful.  If his lungs continue to feel tight I will send in 40 mg of prednisone x5 days.     Return in about 1 week (around 1/17/2022) for Recheck if not improving.    Johnson Jeffrey MD  _______________________________    Chief Complaint   Patient presents with     Sinus Problem     Sinus for 8 days, COVID home test last week wednesday was neg     Cough     Cough for 8 days and now is tightness on chest and H/A     Subjective: Attila Rocha is a 51 year old year old male who I have seen in clinic before who presents with the following acute complaint(s):    Cough / Sinus drainage:   - home covid testing was negative   - started 8 days ago with sinus drainage and cough.  Used pseudoephedrine   - chest is tight.  \"Feeling tight.\"     " - cough spasms.   - Tmax: fever mid week.    - fits of coughing have been awful.  Worse at night.  STruggling to stop coughing.  Albuterol helped a bit.     Review of Systems   Constitutional:        Review of systems negative except as noted in the HPI.   All other systems reviewed and are negative.       Histories reviewed:   Meds              Objective:   There were no vitals taken for this visit.  Physical Exam  Nursing note reviewed.   Constitutional:       General: He is not in acute distress.     Appearance: Normal appearance. He is not ill-appearing.   HENT:      Head: Normocephalic and atraumatic.   Eyes:      Extraocular Movements: Extraocular movements intact.      Conjunctiva/sclera: Conjunctivae normal.   Pulmonary:      Effort: Pulmonary effort is normal.   Neurological:      Mental Status: He is alert and oriented to person, place, and time.   Psychiatric:         Attention and Perception: Attention normal.         Mood and Affect: Mood normal.         Speech: Speech normal.         Thought Content: Thought content normal.         No results found for this or any previous visit (from the past 48 hour(s)).  No results found for this visit on 01/10/22.    This note has been dictated using voice recognition software. Any grammatical or context distortions are unintentional and inherent to the software          Video-Visit Details    Type of service:  Video Visit    Video End Time:12:17 PM    Originating Location (pt. Location): Home    Distant Location (provider location):  [facility]     Platform used for Video Visit: [employer]

## 2022-01-25 ENCOUNTER — MYC MEDICAL ADVICE (OUTPATIENT)
Dept: FAMILY MEDICINE | Facility: CLINIC | Age: 52
End: 2022-01-25
Payer: COMMERCIAL

## 2022-01-25 DIAGNOSIS — J01.01 ACUTE RECURRENT MAXILLARY SINUSITIS: Primary | ICD-10-CM

## 2022-01-25 RX ORDER — DOXYCYCLINE HYCLATE 100 MG
100 TABLET ORAL 2 TIMES DAILY
Qty: 20 TABLET | Refills: 0 | Status: SHIPPED | OUTPATIENT
Start: 2022-01-25 | End: 2022-02-04

## 2022-02-03 ENCOUNTER — TRANSFERRED RECORDS (OUTPATIENT)
Dept: HEALTH INFORMATION MANAGEMENT | Facility: CLINIC | Age: 52
End: 2022-02-03
Payer: COMMERCIAL

## 2022-02-24 ENCOUNTER — TRANSFERRED RECORDS (OUTPATIENT)
Dept: HEALTH INFORMATION MANAGEMENT | Facility: CLINIC | Age: 52
End: 2022-02-24
Payer: COMMERCIAL

## 2022-03-18 ENCOUNTER — OFFICE VISIT (OUTPATIENT)
Dept: FAMILY MEDICINE | Facility: CLINIC | Age: 52
End: 2022-03-18
Payer: COMMERCIAL

## 2022-03-18 VITALS
RESPIRATION RATE: 20 BRPM | BODY MASS INDEX: 41.75 KG/M2 | TEMPERATURE: 97.6 F | SYSTOLIC BLOOD PRESSURE: 136 MMHG | DIASTOLIC BLOOD PRESSURE: 80 MMHG | HEIGHT: 73 IN | WEIGHT: 315 LBS | HEART RATE: 76 BPM

## 2022-03-18 DIAGNOSIS — Z00.00 ENCOUNTER FOR ROUTINE ADULT HEALTH EXAMINATION WITHOUT ABNORMAL FINDINGS: Primary | ICD-10-CM

## 2022-03-18 DIAGNOSIS — E66.813 CLASS 3 SEVERE OBESITY DUE TO EXCESS CALORIES WITH SERIOUS COMORBIDITY AND BODY MASS INDEX (BMI) OF 45.0 TO 49.9 IN ADULT (H): ICD-10-CM

## 2022-03-18 DIAGNOSIS — I10 BENIGN ESSENTIAL HYPERTENSION: ICD-10-CM

## 2022-03-18 DIAGNOSIS — R06.83 SNORES: ICD-10-CM

## 2022-03-18 DIAGNOSIS — E88.810 METABOLIC SYNDROME: ICD-10-CM

## 2022-03-18 DIAGNOSIS — E66.01 CLASS 3 SEVERE OBESITY DUE TO EXCESS CALORIES WITH SERIOUS COMORBIDITY AND BODY MASS INDEX (BMI) OF 45.0 TO 49.9 IN ADULT (H): ICD-10-CM

## 2022-03-18 DIAGNOSIS — R73.03 PREDIABETES: ICD-10-CM

## 2022-03-18 LAB
ALT SERPL W P-5'-P-CCNC: 22 U/L (ref 0–45)
ANION GAP SERPL CALCULATED.3IONS-SCNC: 11 MMOL/L (ref 5–18)
BUN SERPL-MCNC: 12 MG/DL (ref 8–22)
CALCIUM SERPL-MCNC: 10.2 MG/DL (ref 8.5–10.5)
CHLORIDE BLD-SCNC: 103 MMOL/L (ref 98–107)
CHOLEST SERPL-MCNC: 173 MG/DL
CO2 SERPL-SCNC: 28 MMOL/L (ref 22–31)
CREAT SERPL-MCNC: 0.86 MG/DL (ref 0.7–1.3)
FASTING STATUS PATIENT QL REPORTED: YES
GFR SERPL CREATININE-BSD FRML MDRD: >90 ML/MIN/1.73M2
GLUCOSE BLD-MCNC: 147 MG/DL (ref 70–125)
HBA1C MFR BLD: 6.4 %
HDLC SERPL-MCNC: 39 MG/DL
LDLC SERPL CALC-MCNC: 92 MG/DL
POTASSIUM BLD-SCNC: 3.5 MMOL/L (ref 3.5–5)
SODIUM SERPL-SCNC: 142 MMOL/L (ref 136–145)
TRIGL SERPL-MCNC: 212 MG/DL

## 2022-03-18 PROCEDURE — 83036 HEMOGLOBIN GLYCOSYLATED A1C: CPT | Performed by: FAMILY MEDICINE

## 2022-03-18 PROCEDURE — 80061 LIPID PANEL: CPT | Performed by: FAMILY MEDICINE

## 2022-03-18 PROCEDURE — 99396 PREV VISIT EST AGE 40-64: CPT | Performed by: FAMILY MEDICINE

## 2022-03-18 PROCEDURE — 80048 BASIC METABOLIC PNL TOTAL CA: CPT | Performed by: FAMILY MEDICINE

## 2022-03-18 PROCEDURE — 84460 ALANINE AMINO (ALT) (SGPT): CPT | Performed by: FAMILY MEDICINE

## 2022-03-18 PROCEDURE — 36415 COLL VENOUS BLD VENIPUNCTURE: CPT | Performed by: FAMILY MEDICINE

## 2022-03-18 ASSESSMENT — ENCOUNTER SYMPTOMS
HEADACHES: 0
JOINT SWELLING: 0
MYALGIAS: 0
ARTHRALGIAS: 0
CHILLS: 0
DIZZINESS: 0
SORE THROAT: 0
EYE PAIN: 0
WEAKNESS: 0
SHORTNESS OF BREATH: 0
HEMATURIA: 0
PARESTHESIAS: 0
NERVOUS/ANXIOUS: 0
COUGH: 0
CONSTIPATION: 0
DIARRHEA: 0
NAUSEA: 0
HEMATOCHEZIA: 0
FEVER: 0
HEARTBURN: 0
PALPITATIONS: 0
DYSURIA: 0
FREQUENCY: 0
ABDOMINAL PAIN: 0

## 2022-03-18 NOTE — PATIENT INSTRUCTIONS
John Noyola:   - The Case for Keto    The [facility] [employer]   - karie Harley    [email or web address]   - check out the podcast with Dr. Gary Wren   - Why we get sick

## 2022-03-18 NOTE — PROGRESS NOTES
SUBJECTIVE:   CC: Attila Rocha is an 51 year old male who presents for preventative health visit.     Patient has been advised of split billing requirements and indicates understanding: Yes     Weight gain:   - Saxenda not covered by insurance.    -  Continues to try to be active.    Healthy Habits:     Getting at least 3 servings of Calcium per day:  Yes    Bi-annual eye exam:  NO    Dental care twice a year:  Yes    Sleep apnea or symptoms of sleep apnea:  Excessive snoring    Diet:  Regular (no restrictions)    Frequency of exercise:  2-3 days/week    Duration of exercise:  Less than 15 minutes    Taking medications regularly:  Yes    Medication side effects:  None    PHQ-2 Total Score: 0    Additional concerns today:  Yes    Today's PHQ-2 Score:   PHQ-2 ( 1999 Pfizer) 3/18/2022   Q1: Little interest or pleasure in doing things 0   Q2: Feeling down, depressed or hopeless 0   PHQ-2 Score 0   PHQ-2 Total Score (12-17 Years)- Positive if 3 or more points; Administer PHQ-A if positive -   Q1: Little interest or pleasure in doing things Not at all   Q2: Feeling down, depressed or hopeless Not at all   PHQ-2 Score 0     Abuse: Current or Past(Physical, Sexual or Emotional)- No  Do you feel safe in your environment? Yes    Social History     Tobacco Use     Smoking status: Never Smoker     Smokeless tobacco: Never Used   Substance Use Topics     Alcohol use: No     Alcohol Use 3/18/2022   Prescreen: >3 drinks/day or >7 drinks/week? Not Applicable       Last PSA: No results found for: PSA    Reviewed orders with patient. Reviewed health maintenance and updated orders accordingly - Yes    Reviewed and updated as needed this visit by clinical staff   Tobacco  Allergies  Meds              Reviewed and updated as needed this visit by Provider                   Review of Systems   Constitutional: Negative for chills and fever.   HENT: Negative for congestion, ear pain, hearing loss and sore throat.    Eyes: Negative  "for pain and visual disturbance.   Respiratory: Negative for cough and shortness of breath.    Cardiovascular: Negative for chest pain, palpitations and peripheral edema.   Gastrointestinal: Negative for abdominal pain, constipation, diarrhea, heartburn, hematochezia and nausea.   Genitourinary: Negative for dysuria, frequency, genital sores, hematuria, impotence, penile discharge and urgency.   Musculoskeletal: Negative for arthralgias, joint swelling and myalgias.   Skin: Negative for rash.   Neurological: Negative for dizziness, weakness, headaches and paresthesias.   Psychiatric/Behavioral: Negative for mood changes. The patient is not nervous/anxious.        OBJECTIVE:   /80 (BP Location: Left arm, Patient Position: Sitting, Cuff Size: Adult Large)   Pulse 76   Temp 97.6  F (36.4  C) (Oral)   Resp 20   Ht 1.854 m (6' 1\")   Wt (!) 157.9 kg (348 lb 1 oz)   BMI 45.92 kg/m      Physical Exam  Vitals reviewed.   Constitutional:       General: He is not in acute distress.     Appearance: Normal appearance.   HENT:      Head: Normocephalic and atraumatic.      Right Ear: External ear normal.      Left Ear: External ear normal.      Nose: Nose normal.      Mouth/Throat:      Pharynx: No oropharyngeal exudate or posterior oropharyngeal erythema.   Eyes:      General: No scleral icterus.  Cardiovascular:      Rate and Rhythm: Normal rate and regular rhythm.      Heart sounds: Normal heart sounds. No murmur heard.    No friction rub. No gallop.   Pulmonary:      Effort: Pulmonary effort is normal. No respiratory distress.      Breath sounds: No wheezing.   Abdominal:      General: Bowel sounds are normal. There is no distension.      Palpations: Abdomen is soft. There is no mass.      Tenderness: There is no abdominal tenderness.   Musculoskeletal:         General: No swelling. Normal range of motion.      Cervical back: Normal range of motion.      Comments: Skin tags   Skin:     Findings: No rash. " "  Neurological:      General: No focal deficit present.      Mental Status: He is alert and oriented to person, place, and time.      Cranial Nerves: No cranial nerve deficit.      Deep Tendon Reflexes: Reflexes normal.   Psychiatric:         Mood and Affect: Mood normal.         Behavior: Behavior normal.         Thought Content: Thought content normal.         Judgment: Judgment normal.         Diagnostic Test Results:  Labs reviewed in Epic    ASSESSMENT/PLAN:     Snores  Sleep study showed mild.  He has struggled with CPAP.  Planning to ask for a different mask.    Encounter for routine adult health examination without abnormal findings  Up to date with preventative recommendations.  He will consider shingles vaccination but will check with insurance first.      Metabolic syndrome  Discussed nutrition change.  Fasting lab work today.    Benign essential hypertension  BP stable.  No change to medications recommended.  Continue current regimen.  Check BMP.     Class 3 severe obesity due to excess calories with serious comorbidity and body mass index (BMI) of 45.0 to 49.9 in adult (H)  Weight up.  Discussed medication options.  He would consider bariatric surgery but it is not covered by insurance at this time.  Semaglutide?  Likely not covered by insurance.  Discussed nutritional modification. He might experiment with ketogenic nutritional framework.     Patient has been advised of split billing requirements and indicates understanding: Yes    COUNSELING:   Reviewed preventive health counseling, as reflected in patient instructions       Regular exercise       Healthy diet/nutrition    Estimated body mass index is 45.92 kg/m  as calculated from the following:    Height as of this encounter: 1.854 m (6' 1\").    Weight as of this encounter: 157.9 kg (348 lb 1 oz).     Weight management plan: Discussed healthy diet and exercise guidelines    He reports that he has never smoked. He has never used smokeless " tobacco.    Counseling Resources:  ATP IV Guidelines  Pooled Cohorts Equation Calculator  FRAX Risk Assessment  ICSI Preventive Guidelines  Dietary Guidelines for Americans, 2010  USDA's MyPlate  ASA Prophylaxis  Lung CA Screening    Johnson Jeffrey MD  [facility]

## 2022-03-21 NOTE — ASSESSMENT & PLAN NOTE
Weight up.  Discussed medication options.  He would consider bariatric surgery but it is not covered by insurance at this time.  Semaglutide?  Likely not covered by insurance.  Discussed nutritional modification. He might experiment with ketogenic nutritional framework.

## 2022-03-21 NOTE — ASSESSMENT & PLAN NOTE
Up to date with preventative recommendations.  He will consider shingles vaccination but will check with insurance first.

## 2022-03-22 ENCOUNTER — MYC MEDICAL ADVICE (OUTPATIENT)
Dept: FAMILY MEDICINE | Facility: CLINIC | Age: 52
End: 2022-03-22
Payer: COMMERCIAL

## 2022-03-22 DIAGNOSIS — E66.813 CLASS 3 SEVERE OBESITY DUE TO EXCESS CALORIES WITH SERIOUS COMORBIDITY AND BODY MASS INDEX (BMI) OF 45.0 TO 49.9 IN ADULT (H): Primary | ICD-10-CM

## 2022-03-22 DIAGNOSIS — E66.01 CLASS 3 SEVERE OBESITY DUE TO EXCESS CALORIES WITH SERIOUS COMORBIDITY AND BODY MASS INDEX (BMI) OF 45.0 TO 49.9 IN ADULT (H): Primary | ICD-10-CM

## 2022-03-23 RX ORDER — SEMAGLUTIDE 0.25 MG/.5ML
0.25 INJECTION, SOLUTION SUBCUTANEOUS WEEKLY
Qty: 2 ML | Refills: 0 | Status: SHIPPED | OUTPATIENT
Start: 2022-03-23 | End: 2022-04-12

## 2022-04-11 ENCOUNTER — MYC MEDICAL ADVICE (OUTPATIENT)
Dept: FAMILY MEDICINE | Facility: CLINIC | Age: 52
End: 2022-04-11
Payer: COMMERCIAL

## 2022-04-11 DIAGNOSIS — E66.813 CLASS 3 SEVERE OBESITY DUE TO EXCESS CALORIES WITH SERIOUS COMORBIDITY AND BODY MASS INDEX (BMI) OF 45.0 TO 49.9 IN ADULT (H): Primary | ICD-10-CM

## 2022-04-11 DIAGNOSIS — E66.01 CLASS 3 SEVERE OBESITY DUE TO EXCESS CALORIES WITH SERIOUS COMORBIDITY AND BODY MASS INDEX (BMI) OF 45.0 TO 49.9 IN ADULT (H): Primary | ICD-10-CM

## 2022-04-12 RX ORDER — SEMAGLUTIDE 0.5 MG/.5ML
0.5 INJECTION, SOLUTION SUBCUTANEOUS WEEKLY
Qty: 2 ML | Refills: 0 | Status: SHIPPED | OUTPATIENT
Start: 2022-04-12 | End: 2022-06-21

## 2022-04-14 DIAGNOSIS — R73.03 PREDIABETES: Primary | ICD-10-CM

## 2022-04-16 RX ORDER — METFORMIN HCL 500 MG
TABLET, EXTENDED RELEASE 24 HR ORAL
Qty: 180 TABLET | Refills: 1 | Status: SHIPPED | OUTPATIENT
Start: 2022-04-16 | End: 2022-10-06

## 2022-04-16 NOTE — TELEPHONE ENCOUNTER
Disp Refills Start End LUANNE    metFORMIN (GLUCOPHAGE-XR) 500 MG 24 hr tablet 180 tablet 3 4/30/2021  No   Sig - Route: Take 2 tablets (1,000 mg total) by mouth daily with supper. - Oral   Sent to pharmacy as: metFORMIN  mg tablet,extended release 24 hr (GLUCOPHAGE-XR)   E-Prescribing Status: Receipt confirmed by pharmacy (4/30/2021  6:30 AM CDT)       metFORMIN (GLUCOPHAGE-XR) 500 MG 24 hr tablet [[id number]]    Electronically signed by: Johnson Jeffrey MD on 04/30/21 0630 Status: Active   Ordering user: Johnson Jeffrey MD 04/30/21 0630 Authorized by: Johnson Jeffrey MD   Frequency: Daily with supper 04/30/21 - Until Discontinued Released by: Johnson Jeffrey MD 04/30/21 0630   Diagnoses  Prediabetes [R73.03]       Last office visit provider:  3/18/22     Requested Prescriptions   Pending Prescriptions Disp Refills     metFORMIN (GLUCOPHAGE-XR) 500 MG 24 hr tablet [Pharmacy Med Name: metFORMIN HCl ER Oral Tablet Extended Release 24 Hour 500 MG] 180 tablet 0     Sig: Take 2 tablets by mouth daily with supper       Biguanide Agents Passed - 4/14/2022  2:00 AM        Passed - Patient is age 10 or older        Passed - Patient has documented A1c within the specified period of time.     If HgbA1C is 8 or greater, it needs to be on file within the past 3 months.  If less than 8, must be on file within the past 6 months.     Recent Labs   Lab Test 03/18/22  0841   A1C 6.4*             Passed - Patient's CR is NOT>1.4 OR Patient's EGFR is NOT<45 within past 12 mos.     Recent Labs   Lab Test 03/18/22  0841 01/26/21  0735   GFRESTIMATED >90 >60   GFRESTBLACK  --  >60       Recent Labs   Lab Test 03/18/22  0841   CR 0.86             Passed - Patient does NOT have a diagnosis of CHF.        Passed - Medication is active on med list        Passed - Recent (6 mo) or future (30 days) visit within the authorizing provider's specialty     Patient had office visit in the last 6 months or has a visit in the next 30  "days with authorizing provider or within the authorizing provider's specialty.  See \"Patient Info\" tab in inbasket, or \"Choose Columns\" in Meds & Orders section of the refill encounter.                 Mireya Nathan RN 04/16/22 6:45 AM  "

## 2022-05-13 ENCOUNTER — OFFICE VISIT (OUTPATIENT)
Dept: FAMILY MEDICINE | Facility: CLINIC | Age: 52
End: 2022-05-13
Payer: COMMERCIAL

## 2022-05-13 VITALS
BODY MASS INDEX: 41.75 KG/M2 | DIASTOLIC BLOOD PRESSURE: 70 MMHG | OXYGEN SATURATION: 97 % | HEIGHT: 73 IN | HEART RATE: 75 BPM | RESPIRATION RATE: 18 BRPM | SYSTOLIC BLOOD PRESSURE: 132 MMHG | WEIGHT: 315 LBS

## 2022-05-13 DIAGNOSIS — E88.810 METABOLIC SYNDROME: ICD-10-CM

## 2022-05-13 DIAGNOSIS — E66.01 CLASS 3 SEVERE OBESITY DUE TO EXCESS CALORIES WITH SERIOUS COMORBIDITY AND BODY MASS INDEX (BMI) OF 40.0 TO 44.9 IN ADULT (H): Primary | ICD-10-CM

## 2022-05-13 DIAGNOSIS — R73.03 PREDIABETES: ICD-10-CM

## 2022-05-13 DIAGNOSIS — E66.813 CLASS 3 SEVERE OBESITY DUE TO EXCESS CALORIES WITH SERIOUS COMORBIDITY AND BODY MASS INDEX (BMI) OF 40.0 TO 44.9 IN ADULT (H): Primary | ICD-10-CM

## 2022-05-13 DIAGNOSIS — I10 BENIGN ESSENTIAL HYPERTENSION: ICD-10-CM

## 2022-05-13 PROCEDURE — 99213 OFFICE O/P EST LOW 20 MIN: CPT | Performed by: FAMILY MEDICINE

## 2022-05-13 RX ORDER — SEMAGLUTIDE 1 MG/.5ML
1 INJECTION, SOLUTION SUBCUTANEOUS WEEKLY
Qty: 2 ML | Refills: 0 | Status: SHIPPED | OUTPATIENT
Start: 2022-05-13 | End: 2022-06-21

## 2022-05-13 NOTE — PROGRESS NOTES
"Assessment and Plan:     Class 3 severe obesity due to excess calories with serious comorbidity and body mass index (BMI) of 40.0 to 44.9 in adult (H)  51 year old year old male in clinic today to discuss treatment of the following conditions through diet and lifestyle modification and weight loss:  1. Class 3 severe obesity due to excess calories with serious comorbidity and body mass index (BMI) of 40.0 to 44.9 in adult (H)    2. Benign essential hypertension    3. Prediabetes    4. Metabolic syndrome      The patient's weight loss result since the last visit was successful based on nutrition change and weight loss.  Energy okay.  Focus is on protein.  He has restricted sugar.  Supplementing protein.    - increase wegovy to 1.0 mg.  Consider ozempic if unavailable.     - follow-up in 3 months.     Return in about 3 months (around 8/13/2022) for Weight loss follow-up visit, (video visit).    Chief Complaint   Patient presents with     Follow Up     Wegovy, and lab     Subjective  Patient presents for treatment of chronic, comorbid conditions using intensive therapeutic lifestyle interventions including nutrition, physical activity, and behavior management.   - successes: \"I totally changed how I ate.\" No sweets at all.  Caloric intake has decreased.  \"Tough few days\" after recent visit and near DM measurement.  \"My go-tos are gone.\"   - struggles: none today.    - tracking/journaling: get a lot of protein.  Restriction of carbs.     - hunger/cravings: improved   - medication benefits: helpful. side effects: none    Reviewed history:  Tobacco  Allergies  Meds  Problems  Med Hx  Surg Hx  Fam Hx            Objective:  /70 (BP Location: Left arm, Patient Position: Sitting, Cuff Size: Adult Large)   Pulse 75   Resp 18   Ht 1.854 m (6' 1\")   Wt 148.1 kg (326 lb 6.4 oz)   SpO2 97%   BMI 43.06 kg/m    Change from start of program:          Body mass index is 43.06 kg/m .  No LMP for male " patient.  Physical Exam  Nursing note reviewed.   Constitutional:       General: He is not in acute distress.     Appearance: Normal appearance. He is not ill-appearing.   HENT:      Head: Normocephalic and atraumatic.   Eyes:      Extraocular Movements: Extraocular movements intact.      Conjunctiva/sclera: Conjunctivae normal.   Pulmonary:      Effort: Pulmonary effort is normal.   Neurological:      Mental Status: He is alert and oriented to person, place, and time.   Psychiatric:         Attention and Perception: Attention normal.         Mood and Affect: Mood normal.         Speech: Speech normal.         Thought Content: Thought content normal.         This note has been dictated using voice recognition software. Any grammatical or context distortions are unintentional and inherent to the software

## 2022-05-13 NOTE — ASSESSMENT & PLAN NOTE
51 year old year old male in clinic today to discuss treatment of the following conditions through diet and lifestyle modification and weight loss:  1. Class 3 severe obesity due to excess calories with serious comorbidity and body mass index (BMI) of 40.0 to 44.9 in adult (H)    2. Benign essential hypertension    3. Prediabetes    4. Metabolic syndrome      The patient's weight loss result since the last visit was successful based on nutrition change and weight loss.  Energy okay.  Focus is on protein.  He has restricted sugar.  Supplementing protein.    - increase wegovy to 1.0 mg.  Consider ozempic if unavailable.     - follow-up in 3 months.    Damion

## 2022-05-31 ENCOUNTER — MYC MEDICAL ADVICE (OUTPATIENT)
Dept: FAMILY MEDICINE | Facility: CLINIC | Age: 52
End: 2022-05-31
Payer: COMMERCIAL

## 2022-05-31 DIAGNOSIS — E66.813 CLASS 3 SEVERE OBESITY DUE TO EXCESS CALORIES WITH SERIOUS COMORBIDITY AND BODY MASS INDEX (BMI) OF 40.0 TO 44.9 IN ADULT (H): Primary | ICD-10-CM

## 2022-05-31 DIAGNOSIS — E66.01 CLASS 3 SEVERE OBESITY DUE TO EXCESS CALORIES WITH SERIOUS COMORBIDITY AND BODY MASS INDEX (BMI) OF 40.0 TO 44.9 IN ADULT (H): Primary | ICD-10-CM

## 2022-05-31 DIAGNOSIS — I10 BENIGN ESSENTIAL HYPERTENSION: ICD-10-CM

## 2022-05-31 RX ORDER — SEMAGLUTIDE 1.7 MG/.75ML
1.7 INJECTION, SOLUTION SUBCUTANEOUS WEEKLY
Qty: 3 ML | Refills: 0 | Status: SHIPPED | OUTPATIENT
Start: 2022-05-31 | End: 2022-06-21

## 2022-06-04 ENCOUNTER — TRANSFERRED RECORDS (OUTPATIENT)
Dept: HEALTH INFORMATION MANAGEMENT | Facility: CLINIC | Age: 52
End: 2022-06-04
Payer: COMMERCIAL

## 2022-06-20 ENCOUNTER — MYC MEDICAL ADVICE (OUTPATIENT)
Dept: FAMILY MEDICINE | Facility: CLINIC | Age: 52
End: 2022-06-20
Payer: COMMERCIAL

## 2022-06-20 DIAGNOSIS — E66.813 CLASS 3 SEVERE OBESITY DUE TO EXCESS CALORIES WITH SERIOUS COMORBIDITY AND BODY MASS INDEX (BMI) OF 40.0 TO 44.9 IN ADULT (H): ICD-10-CM

## 2022-06-20 DIAGNOSIS — E66.01 CLASS 3 SEVERE OBESITY DUE TO EXCESS CALORIES WITH SERIOUS COMORBIDITY AND BODY MASS INDEX (BMI) OF 40.0 TO 44.9 IN ADULT (H): ICD-10-CM

## 2022-06-20 DIAGNOSIS — I10 BENIGN ESSENTIAL HYPERTENSION: ICD-10-CM

## 2022-06-21 RX ORDER — SEMAGLUTIDE 1.7 MG/.75ML
1.7 INJECTION, SOLUTION SUBCUTANEOUS WEEKLY
Qty: 3 ML | Refills: 2 | Status: SHIPPED | OUTPATIENT
Start: 2022-06-21 | End: 2022-07-26

## 2022-07-26 ENCOUNTER — MYC MEDICAL ADVICE (OUTPATIENT)
Dept: FAMILY MEDICINE | Facility: CLINIC | Age: 52
End: 2022-07-26

## 2022-07-26 DIAGNOSIS — E66.813 CLASS 3 SEVERE OBESITY DUE TO EXCESS CALORIES WITH SERIOUS COMORBIDITY AND BODY MASS INDEX (BMI) OF 40.0 TO 44.9 IN ADULT (H): Primary | ICD-10-CM

## 2022-07-26 DIAGNOSIS — E66.01 CLASS 3 SEVERE OBESITY DUE TO EXCESS CALORIES WITH SERIOUS COMORBIDITY AND BODY MASS INDEX (BMI) OF 40.0 TO 44.9 IN ADULT (H): Primary | ICD-10-CM

## 2022-07-26 RX ORDER — SEMAGLUTIDE 2.4 MG/.75ML
2.4 INJECTION, SOLUTION SUBCUTANEOUS WEEKLY
Qty: 3 ML | Refills: 2 | Status: SHIPPED | OUTPATIENT
Start: 2022-07-26 | End: 2022-10-20

## 2022-08-03 ENCOUNTER — TRANSFERRED RECORDS (OUTPATIENT)
Dept: HEALTH INFORMATION MANAGEMENT | Facility: CLINIC | Age: 52
End: 2022-08-03

## 2022-09-24 ENCOUNTER — HEALTH MAINTENANCE LETTER (OUTPATIENT)
Age: 52
End: 2022-09-24

## 2022-10-04 ENCOUNTER — MYC MEDICAL ADVICE (OUTPATIENT)
Dept: FAMILY MEDICINE | Facility: CLINIC | Age: 52
End: 2022-10-04

## 2022-10-04 DIAGNOSIS — I10 BENIGN ESSENTIAL HYPERTENSION: ICD-10-CM

## 2022-10-04 RX ORDER — VALSARTAN AND HYDROCHLOROTHIAZIDE 160; 25 MG/1; MG/1
1 TABLET ORAL DAILY
Qty: 90 TABLET | Refills: 1 | Status: SHIPPED | OUTPATIENT
Start: 2022-10-04 | End: 2023-03-28

## 2022-10-04 NOTE — TELEPHONE ENCOUNTER
"Last Written Prescription Date:  1/10/2022  Last Fill Quantity: 90,  # refills: 1   Last office visit provider:  5/13/2022     Requested Prescriptions   Pending Prescriptions Disp Refills     valsartan-hydrochlorothiazide (DIOVAN HCT) 160-25 MG tablet 90 tablet 1     Sig: Take 1 tablet by mouth daily       Angiotensin-II Receptors Passed - 10/4/2022 10:49 AM        Passed - Last blood pressure under 140/90 in past 12 months     BP Readings from Last 3 Encounters:   05/13/22 132/70   03/18/22 136/80   01/14/21 137/77                 Passed - Recent (12 mo) or future (30 days) visit within the authorizing provider's specialty     Patient has had an office visit with the authorizing provider or a provider within the authorizing providers department within the previous 12 mos or has a future within next 30 days. See \"Patient Info\" tab in inbasket, or \"Choose Columns\" in Meds & Orders section of the refill encounter.              Passed - Medication is active on med list        Passed - Patient is age 18 or older        Passed - Normal serum creatinine on file in past 12 months     Recent Labs   Lab Test 03/18/22  0841   CR 0.86       Ok to refill medication if creatinine is low          Passed - Normal serum potassium on file in past 12 months     Recent Labs   Lab Test 03/18/22  0841   POTASSIUM 3.5                   Diuretics (Including Combos) Protocol Passed - 10/4/2022 10:49 AM        Passed - Blood pressure under 140/90 in past 12 months     BP Readings from Last 3 Encounters:   05/13/22 132/70   03/18/22 136/80   01/14/21 137/77                 Passed - Recent (12 mo) or future (30 days) visit within the authorizing provider's specialty     Patient has had an office visit with the authorizing provider or a provider within the authorizing providers department within the previous 12 mos or has a future within next 30 days. See \"Patient Info\" tab in inbasket, or \"Choose Columns\" in Meds & Orders section of the " refill encounter.              Passed - Medication is active on med list        Passed - Patient is age 18 or older        Passed - Normal serum creatinine on file in past 12 months     Recent Labs   Lab Test 03/18/22  0841   CR 0.86              Passed - Normal serum potassium on file in past 12 months     Recent Labs   Lab Test 03/18/22  0841   POTASSIUM 3.5                    Passed - Normal serum sodium on file in past 12 months     Recent Labs   Lab Test 03/18/22  0841                      Oneyda Alcazar RN 10/04/22 2:31 PM

## 2022-10-20 DIAGNOSIS — E66.01 CLASS 3 SEVERE OBESITY DUE TO EXCESS CALORIES WITH SERIOUS COMORBIDITY AND BODY MASS INDEX (BMI) OF 40.0 TO 44.9 IN ADULT (H): ICD-10-CM

## 2022-10-20 DIAGNOSIS — E66.813 CLASS 3 SEVERE OBESITY DUE TO EXCESS CALORIES WITH SERIOUS COMORBIDITY AND BODY MASS INDEX (BMI) OF 40.0 TO 44.9 IN ADULT (H): ICD-10-CM

## 2022-10-20 RX ORDER — SEMAGLUTIDE 2.4 MG/.75ML
INJECTION, SOLUTION SUBCUTANEOUS
Qty: 3 ML | Refills: 0 | Status: SHIPPED | OUTPATIENT
Start: 2022-10-20 | End: 2022-11-22

## 2022-11-07 DIAGNOSIS — I10 BENIGN ESSENTIAL HYPERTENSION: ICD-10-CM

## 2022-11-08 RX ORDER — HYDROCHLOROTHIAZIDE 12.5 MG/1
CAPSULE ORAL
Qty: 90 CAPSULE | Refills: 0 | Status: SHIPPED | OUTPATIENT
Start: 2022-11-08 | End: 2023-02-06

## 2022-11-08 NOTE — TELEPHONE ENCOUNTER
"Last Written Prescription Date:  1/17/22  Last Fill Quantity: 90,  # refills: 3   Last office visit provider:  5/13/22     Requested Prescriptions   Pending Prescriptions Disp Refills     hydrochlorothiazide (MICROZIDE) 12.5 MG capsule [Pharmacy Med Name: hydroCHLOROthiazide Oral Capsule 12.5 MG] 90 capsule 0     Sig: TAKE ONE CAPSULE BY MOUTH ONE TIME DAILY       Diuretics (Including Combos) Protocol Passed - 11/7/2022 11:14 AM        Passed - Blood pressure under 140/90 in past 12 months     BP Readings from Last 3 Encounters:   05/13/22 132/70   03/18/22 136/80   01/14/21 137/77                 Passed - Recent (12 mo) or future (30 days) visit within the authorizing provider's specialty     Patient has had an office visit with the authorizing provider or a provider within the authorizing providers department within the previous 12 mos or has a future within next 30 days. See \"Patient Info\" tab in inbasket, or \"Choose Columns\" in Meds & Orders section of the refill encounter.              Passed - Medication is active on med list        Passed - Patient is age 18 or older        Passed - Normal serum creatinine on file in past 12 months     Recent Labs   Lab Test 03/18/22  0841   CR 0.86              Passed - Normal serum potassium on file in past 12 months     Recent Labs   Lab Test 03/18/22  0841   POTASSIUM 3.5                    Passed - Normal serum sodium on file in past 12 months     Recent Labs   Lab Test 03/18/22  0841                      Sia Rodriguez RN 11/08/22 3:29 PM  "

## 2022-11-21 ENCOUNTER — MYC MEDICAL ADVICE (OUTPATIENT)
Dept: FAMILY MEDICINE | Facility: CLINIC | Age: 52
End: 2022-11-21

## 2022-11-21 DIAGNOSIS — E66.01 CLASS 3 SEVERE OBESITY DUE TO EXCESS CALORIES WITH SERIOUS COMORBIDITY AND BODY MASS INDEX (BMI) OF 40.0 TO 44.9 IN ADULT (H): ICD-10-CM

## 2022-11-21 DIAGNOSIS — E66.813 CLASS 3 SEVERE OBESITY DUE TO EXCESS CALORIES WITH SERIOUS COMORBIDITY AND BODY MASS INDEX (BMI) OF 40.0 TO 44.9 IN ADULT (H): ICD-10-CM

## 2022-11-22 RX ORDER — SEMAGLUTIDE 2.4 MG/.75ML
2.4 INJECTION, SOLUTION SUBCUTANEOUS WEEKLY
Qty: 3 ML | Refills: 5 | Status: SHIPPED | OUTPATIENT
Start: 2022-11-22 | End: 2023-03-28

## 2022-12-01 ENCOUNTER — NURSE TRIAGE (OUTPATIENT)
Dept: NURSING | Facility: CLINIC | Age: 52
End: 2022-12-01

## 2022-12-01 NOTE — TELEPHONE ENCOUNTER
Tested positive for covid     Symptoms started 11/29/22    Denies a temp    Denies any chest pain or pressure    Has fatigue, nasal congestion, sore throat    Denies any shortness of breath    Headache with the congestion but is able to touch his chin to his chest    Scheduled for covid treatment per protocol    Lali Carrasco RN  [place] Nurse Advisor  12:14 PM  12/1/2022      Reason for Disposition    HIGH RISK for severe COVID complications (e.g., weak immune system, age > 64 years, obesity with BMI > 25, pregnant, chronic lung disease or other chronic medical condition) (Exception: Already seen by PCP and no new or worsening symptoms.)    Additional Information    Negative: SEVERE difficulty breathing (e.g., struggling for each breath, speaks in single words)    Negative: Difficult to awaken or acting confused (e.g., disoriented, slurred speech)    Negative: Shock suspected (e.g., cold/pale/clammy skin, too weak to stand, low BP, rapid pulse)    Negative: Bluish (or gray) lips or face now    Negative: Sounds like a life-threatening emergency to the triager    Negative: SEVERE or constant chest pain or pressure  (Exception: Mild central chest pain, present only when coughing.)    Negative: MODERATE difficulty breathing (e.g., speaks in phrases, SOB even at rest, pulse 100-120)    Negative: Headache and stiff neck (can't touch chin to chest)    Negative: Oxygen level (e.g., pulse oximetry) 90 percent or lower    Negative: Chest pain or pressure    Negative: Patient sounds very sick or weak to the triager    Negative: [1] Fever > 100.0 F (37.8 C) AND [2] bedridden (e.g., nursing home patient, CVA, chronic illness, recovering from surgery)    Negative: [1] Fever > 101 F (38.3 C) AND [2] over 60 years of age    Negative: Fever > 103 F (39.4 C)    Negative: MILD difficulty breathing (e.g., minimal/no SOB at rest, SOB with walking, pulse <100)    Protocols used: CORONAVIRUS (COVID-19) DIAGNOSED OR [id number]  1.18.2022

## 2022-12-02 ENCOUNTER — VIRTUAL VISIT (OUTPATIENT)
Dept: FAMILY MEDICINE | Facility: CLINIC | Age: 52
End: 2022-12-02
Payer: COMMERCIAL

## 2022-12-02 DIAGNOSIS — U07.1 COVID-19 VIRUS INFECTION: Primary | ICD-10-CM

## 2022-12-02 PROCEDURE — 99213 OFFICE O/P EST LOW 20 MIN: CPT | Mod: 95 | Performed by: PHYSICIAN ASSISTANT

## 2022-12-02 NOTE — PROGRESS NOTES
"Attila is a 52 year old who is being evaluated via a billable video visit.       How would you like to obtain your AVS? MyChart  If the video visit is dropped, the invitation should be resent by: Text to cell phone: [phone]  Will anyone else be joining your video visit? No    Assessment & Plan   Problem List Items Addressed This Visit    None  Visit Diagnoses     COVID-19 virus infection    -  Primary         Impression is COVID-19. Positive home test. Appears well and non-toxic and I have low suspicion for impending airway obstruction or respiratory distress at this point.  He will push p.o. fluids, use over-the-counter meds for symptoms, complete a course of Paxlovid after discussing risks/benefits, and follow-up with us in 2 weeks if not improving or urgent care/the ER if symptoms worsen/change at any time.    DDx and Dx discussed with and explained to the pt to their satisfaction.  All questions were answered at this time. Pt expressed understanding of and agreement with this dx, tx, and plan. No further workup warranted and standard medication warnings given. I have given the patient a list of pertinent indications for re-evaluation. Will go to the Emergency Department if symptoms worsen or new concerning symptoms arise. Patient left the call in no apparent distress.     Prescription drug management     BMI:   Estimated body mass index is 43.06 kg/m  as calculated from the following:    Height as of 5/13/22: 1.854 m (6' 1\").    Weight as of 5/13/22: 148.1 kg (326 lb 6.4 oz).     See Patient Instructions  COVID-19 positive patient.  Encounter for consideration of medication intervention. Patient does qualify for a prescription. Full discussion with patient including medication options, risks and benefits. Potential drug interactions reviewed with patient.     Treatment Planned Paxlovid  Temporary change to home medications:  None     Estimated body mass index is 43.06 kg/m  as calculated from the " "following:    Height as of 5/13/22: 1.854 m (6' 1\").    Weight as of 5/13/22: 148.1 kg (326 lb 6.4 oz).  GFR Estimate   Date Value Ref Range Status   03/18/2022 >90 >60 mL/min/1.73m2 Final     Comment:     Effective December 21, 2021 eGFRcr in adults is calculated using the 2021 CKD-EPI creatinine equation which includes age and gender (Edin et al., NE, DOI: 10.1056/FZMTma8569466)   01/26/2021 >60 >60 mL/min/1.73m2 Final     No results found for: [id number]    Return in about 2 weeks (around 12/16/2022) for a recheck of your symptoms if not improving, or call 911/go to an ER anytime if worsening.    DONTE Burger  [facility] FELIX Aiekn is a 52 year old presenting for the following health issues:  Covid Concern (Tested positive on 11/30/22)      HPI       COVID-19 Symptom Review  How many days ago did these symptoms start? 2 days    Are any of the following symptoms significant for you?    New or worsening difficulty breathing? No    Worsening cough? yes    Fever or chills? No    Headache: YES    Sore throat: YES    Chest pain: No    Diarrhea: No    Body aches? YES    What treatments has patient tried? Tylenol, Ibuprofen, Sudafed, Guifenison   Does patient live in a nursing home, group home, or shelter? No  Does patient have a way to get food/medications during quarantined? Yes, I have a friend or family member who can help me.    Review of Systems   Constitutional, HEENT, cardiovascular, pulmonary, gi and gu systems are negative, except as otherwise noted.      Objective           Vitals:  No vitals were obtained today due to virtual visit.    Physical Exam   GENERAL: Healthy, alert and no distress  EYES: Eyes grossly normal to inspection.  No discharge or erythema, or obvious scleral/conjunctival abnormalities.  RESP: No audible wheeze, cough, or visible cyanosis.  No visible retractions or increased work of breathing.    SKIN: Visible skin clear. No significant rash, " abnormal pigmentation or lesions.  NEURO: Cranial nerves grossly intact.  Mentation and speech appropriate for age.  PSYCH: Mentation appears normal, affect normal/bright, judgement and insight intact, normal speech and appearance well-groomed.        Video-Visit Details    Video Start Time: 1:53 PM    Type of service:  Video Visit    Video End Time: 2:04 PM    Originating Location (pt. Location): Home    Distant Location (provider location):  On-site    Platform used for Video Visit: Mary

## 2022-12-02 NOTE — PATIENT INSTRUCTIONS
Myron Aiken,    Thank you for allowing [facility] to manage your care.    If you develop worsening/changing symptoms at any time, please call 911 or go to the emergency department for evaluation.    I sent your prescriptions to your pharmacy.    Drink 8-10 glasses of fluid daily to stay well-hydrated.    For your pain, please use Tylenol 650mg every 6 hours. You may use 400mg of ibuprofen between doses of Tylenol.     Max acetaminophen (Tylenol) 4,000mg/24 hours  Max ibuprofen 3,000mg/24 hours    If you have any questions or concerns, please feel free to call us at [phone]    Sincerely,    Joselito Lipscomb PA-C    Did you know?      You can schedule a video visit for follow-up appointments as well as future appointments for certain conditions.  Please see the below link.     [email or web address]    If you have not already done so,  I encourage you to sign up for [facility] ([email or web address]).  This will allow you to review your results, securely communicate with a provider, and schedule virtual visits as well.

## 2023-01-19 ENCOUNTER — OFFICE VISIT (OUTPATIENT)
Dept: FAMILY MEDICINE | Facility: CLINIC | Age: 53
End: 2023-01-19
Payer: COMMERCIAL

## 2023-01-19 VITALS
HEART RATE: 88 BPM | DIASTOLIC BLOOD PRESSURE: 76 MMHG | HEIGHT: 73 IN | BODY MASS INDEX: 41.75 KG/M2 | RESPIRATION RATE: 18 BRPM | OXYGEN SATURATION: 96 % | WEIGHT: 315 LBS | SYSTOLIC BLOOD PRESSURE: 128 MMHG

## 2023-01-19 DIAGNOSIS — R30.0 DYSURIA: ICD-10-CM

## 2023-01-19 DIAGNOSIS — R31.9 HEMATURIA, UNSPECIFIED TYPE: Primary | ICD-10-CM

## 2023-01-19 LAB
ALBUMIN UR-MCNC: NEGATIVE MG/DL
APPEARANCE UR: CLEAR
BILIRUB UR QL STRIP: NEGATIVE
COLOR UR AUTO: YELLOW
GLUCOSE UR STRIP-MCNC: NEGATIVE MG/DL
HGB UR QL STRIP: NEGATIVE
KETONES UR STRIP-MCNC: NEGATIVE MG/DL
LEUKOCYTE ESTERASE UR QL STRIP: NEGATIVE
NITRATE UR QL: NEGATIVE
PH UR STRIP: 6 [PH] (ref 5–8)
SP GR UR STRIP: 1.01 (ref 1–1.03)
UROBILINOGEN UR STRIP-ACNC: 0.2 E.U./DL

## 2023-01-19 PROCEDURE — 99213 OFFICE O/P EST LOW 20 MIN: CPT

## 2023-01-19 PROCEDURE — 81003 URINALYSIS AUTO W/O SCOPE: CPT

## 2023-01-19 ASSESSMENT — ENCOUNTER SYMPTOMS
ABDOMINAL PAIN: 0
FEVER: 0
DYSURIA: 1
HEMATURIA: 1
FATIGUE: 1
DIFFICULTY URINATING: 0
FREQUENCY: 0
CHILLS: 0

## 2023-01-19 NOTE — PROGRESS NOTES
Problem List Items Addressed This Visit    None  Visit Diagnoses     Hematuria, unspecified type    -  Primary    Relevant Orders    UA macro with reflex to Microscopic and Culture - Clinc Collect (Completed)    Dysuria            Discussed common etiologies behind gross and microscopic hematuria today, including urinary tract infection, kidney stone, and more worrisome diagnoses such as cancer of the urinary tract.  With a complete resolution of symptoms and an absence of infection and blood on UA today, shared decision-making was utilized with the patient.  Ultimately decided on watchful waiting. With patient's extensive history of kidney stones, symptoms could be a result of passage of a small stone or two. Patient will monitor for recurrence of symptoms.  Would pursue more aggressive workup for gross hematuria at that time, such as referral and cystoscopy. Patient has a follow up scheduled with his primary care provider in the upcoming month or two. Patient expressed an understanding of and agreement with the above plan. All questions were answered.    PHIL Thayer CNP on 1/19/2023 at 3:56 PM      Emerald Aiken is a 52 year old who presents for the following health issues   Chief Complaint   Patient presents with     Symptoms     Sporadic pain and blood when urinating x1-3 days      Patient reports 2 incidences of gross hematuria in the last 4 days.  In addition to the blood, he notes a burning sensation at the tip of his penis.  This was only present when he also had hematuria.  Denies any other urinary symptoms, including frequency, urgency, dysuria.  Does not have any abdominal or flank pain.  No systemic symptoms such as fever or chills. No testicular symptoms.  Endorses a significant history of kidney stone (approximately 20 in his lifetime).  He has had a few removed.  Reports last incident was approximately 10 years ago.  Denies a history of urinary tract infections.  Is not on any  "blood thinning medications.      History of Present Illness       Reason for visit:  Periodic pain and blood when urinating.  Symptom onset:  1-3 days ago  Symptom intensity:  Moderate    He eats 2-3 servings of fruits and vegetables daily.He consumes 0 sweetened beverage(s) daily.He exercises with enough effort to increase his heart rate 9 or less minutes per day.  He exercises with enough effort to increase his heart rate 3 or less days per week.   He is taking medications regularly.       Review of Systems   Constitutional: Positive for fatigue ('little run down'). Negative for chills and fever.   Gastrointestinal: Negative for abdominal pain.   Genitourinary: Positive for dysuria (burning) and hematuria. Negative for difficulty urinating and frequency.        No dribbling          Objective    /76 (BP Location: Left arm, Patient Position: Sitting, Cuff Size: Adult Large)   Pulse 88   Resp 18   Ht 1.854 m (6' 1\")   Wt 145 kg (319 lb 11.2 oz)   SpO2 96%   BMI 42.18 kg/m    Body mass index is 42.18 kg/m .     Physical Exam  Vitals and nursing note reviewed.   Constitutional:       General: He is not in acute distress.     Appearance: Normal appearance. He is not ill-appearing.   Abdominal:      General: There is no distension.      Palpations: Abdomen is soft. There is no mass.      Tenderness: There is no abdominal tenderness. There is no right CVA tenderness, left CVA tenderness or guarding.   Neurological:      Mental Status: He is alert.          Results for orders placed or performed in visit on 01/19/23 (from the past 24 hour(s))   UA macro with reflex to Microscopic and Culture - Clinc Collect    Specimen: Urine, Clean Catch   Result Value Ref Range    Color Urine Yellow Colorless, Straw, Light Yellow, Yellow    Appearance Urine Clear Clear    Glucose Urine Negative Negative mg/dL    Bilirubin Urine Negative Negative    Ketones Urine Negative Negative mg/dL    Specific Gravity Urine 1.010 1.005 - " 1.030    Blood Urine Negative Negative    pH Urine 6.0 5.0 - 8.0    Protein Albumin Urine Negative Negative mg/dL    Urobilinogen Urine 0.2 0.2, 1.0 E.U./dL    Nitrite Urine Negative Negative    Leukocyte Esterase Urine Negative Negative    Narrative    Microscopic not indicated         This note has been dictated using voice recognition software. Any grammatical or context distortions are unintentional and inherent to the software

## 2023-02-04 ENCOUNTER — MYC MEDICAL ADVICE (OUTPATIENT)
Dept: FAMILY MEDICINE | Facility: CLINIC | Age: 53
End: 2023-02-04
Payer: COMMERCIAL

## 2023-02-04 DIAGNOSIS — I10 BENIGN ESSENTIAL HYPERTENSION: ICD-10-CM

## 2023-02-06 RX ORDER — HYDROCHLOROTHIAZIDE 12.5 MG/1
1 CAPSULE ORAL DAILY
Qty: 90 CAPSULE | Refills: 0 | Status: SHIPPED | OUTPATIENT
Start: 2023-02-06 | End: 2023-03-28

## 2023-02-06 NOTE — TELEPHONE ENCOUNTER
"Prescription approved per [facility] Refill Protocol.    Requested Prescriptions   Pending Prescriptions Disp Refills     hydrochlorothiazide (MICROZIDE) 12.5 MG capsule 90 capsule 0     Sig: Take 1 capsule (12.5 mg) by mouth daily       Diuretics (Including Combos) Protocol Passed - 2/6/2023  7:49 AM        Passed - Blood pressure under 140/90 in past 12 months     BP Readings from Last 3 Encounters:   01/19/23 128/76   05/13/22 132/70   03/18/22 136/80                 Passed - Recent (12 mo) or future (30 days) visit within the authorizing provider's specialty     Patient has had an office visit with the authorizing provider or a provider within the authorizing providers department within the previous 12 mos or has a future within next 30 days. See \"Patient Info\" tab in inbasket, or \"Choose Columns\" in Meds & Orders section of the refill encounter.              Passed - Medication is active on med list        Passed - Patient is age 18 or older        Passed - Normal serum creatinine on file in past 12 months     Recent Labs   Lab Test 03/18/22  0841   CR 0.86              Passed - Normal serum potassium on file in past 12 months     Recent Labs   Lab Test 03/18/22  0841   POTASSIUM 3.5                    Passed - Normal serum sodium on file in past 12 months     Recent Labs   Lab Test 03/18/22  0841                      Kyle Castro RN  [facility]     "

## 2023-03-27 ASSESSMENT — ENCOUNTER SYMPTOMS
DYSURIA: 0
FEVER: 0
DIZZINESS: 0
PARESTHESIAS: 0
SHORTNESS OF BREATH: 0
COUGH: 0
ARTHRALGIAS: 1
MYALGIAS: 0
HEARTBURN: 0
HEADACHES: 0
DIARRHEA: 0
NERVOUS/ANXIOUS: 0
ABDOMINAL PAIN: 0
HEMATURIA: 0
EYE PAIN: 0
SORE THROAT: 0
PALPITATIONS: 0
CHILLS: 0
JOINT SWELLING: 0
CONSTIPATION: 0
HEMATOCHEZIA: 0
NAUSEA: 0
FREQUENCY: 0
WEAKNESS: 0

## 2023-03-28 ENCOUNTER — OFFICE VISIT (OUTPATIENT)
Dept: FAMILY MEDICINE | Facility: CLINIC | Age: 53
End: 2023-03-28
Payer: COMMERCIAL

## 2023-03-28 VITALS
HEIGHT: 73 IN | BODY MASS INDEX: 41.12 KG/M2 | WEIGHT: 310.31 LBS | SYSTOLIC BLOOD PRESSURE: 121 MMHG | RESPIRATION RATE: 16 BRPM | TEMPERATURE: 97.5 F | OXYGEN SATURATION: 97 % | DIASTOLIC BLOOD PRESSURE: 79 MMHG | HEART RATE: 77 BPM

## 2023-03-28 DIAGNOSIS — R73.03 PREDIABETES: ICD-10-CM

## 2023-03-28 DIAGNOSIS — Z78.9 HEPATITIS B VACCINATION STATUS UNKNOWN: ICD-10-CM

## 2023-03-28 DIAGNOSIS — E55.9 AVITAMINOSIS D: ICD-10-CM

## 2023-03-28 DIAGNOSIS — E66.813 CLASS 3 SEVERE OBESITY DUE TO EXCESS CALORIES WITH SERIOUS COMORBIDITY AND BODY MASS INDEX (BMI) OF 40.0 TO 44.9 IN ADULT (H): ICD-10-CM

## 2023-03-28 DIAGNOSIS — E78.5 DYSLIPIDEMIA: ICD-10-CM

## 2023-03-28 DIAGNOSIS — Z71.84 TRAVEL ADVICE ENCOUNTER: ICD-10-CM

## 2023-03-28 DIAGNOSIS — E66.01 CLASS 3 SEVERE OBESITY DUE TO EXCESS CALORIES WITH SERIOUS COMORBIDITY AND BODY MASS INDEX (BMI) OF 40.0 TO 44.9 IN ADULT (H): ICD-10-CM

## 2023-03-28 DIAGNOSIS — Z13.29 SCREENING FOR ENDOCRINE, NUTRITIONAL, METABOLIC AND IMMUNITY DISORDER: ICD-10-CM

## 2023-03-28 DIAGNOSIS — Z00.00 ENCOUNTER FOR ROUTINE ADULT HEALTH EXAMINATION WITHOUT ABNORMAL FINDINGS: Primary | ICD-10-CM

## 2023-03-28 DIAGNOSIS — E88.810 METABOLIC SYNDROME: ICD-10-CM

## 2023-03-28 DIAGNOSIS — I10 BENIGN ESSENTIAL HYPERTENSION: ICD-10-CM

## 2023-03-28 DIAGNOSIS — Z13.228 SCREENING FOR ENDOCRINE, NUTRITIONAL, METABOLIC AND IMMUNITY DISORDER: ICD-10-CM

## 2023-03-28 DIAGNOSIS — Z12.5 SCREENING FOR PROSTATE CANCER: ICD-10-CM

## 2023-03-28 DIAGNOSIS — Z13.0 SCREENING FOR ENDOCRINE, NUTRITIONAL, METABOLIC AND IMMUNITY DISORDER: ICD-10-CM

## 2023-03-28 DIAGNOSIS — Z13.21 SCREENING FOR ENDOCRINE, NUTRITIONAL, METABOLIC AND IMMUNITY DISORDER: ICD-10-CM

## 2023-03-28 PROBLEM — J01.01 ACUTE RECURRENT MAXILLARY SINUSITIS: Status: RESOLVED | Noted: 2019-10-04 | Resolved: 2023-03-28

## 2023-03-28 LAB
ALT SERPL W P-5'-P-CCNC: 24 U/L (ref 10–50)
ANION GAP SERPL CALCULATED.3IONS-SCNC: 12 MMOL/L (ref 7–15)
BUN SERPL-MCNC: 16.5 MG/DL (ref 6–20)
CALCIUM SERPL-MCNC: 9.6 MG/DL (ref 8.6–10)
CHLORIDE SERPL-SCNC: 102 MMOL/L (ref 98–107)
CHOLEST SERPL-MCNC: 147 MG/DL
CREAT SERPL-MCNC: 0.95 MG/DL (ref 0.67–1.17)
DEPRECATED CALCIDIOL+CALCIFEROL SERPL-MC: 33 UG/L (ref 20–75)
DEPRECATED HCO3 PLAS-SCNC: 28 MMOL/L (ref 22–29)
GFR SERPL CREATININE-BSD FRML MDRD: >90 ML/MIN/1.73M2
GLUCOSE SERPL-MCNC: 104 MG/DL (ref 70–99)
HBA1C MFR BLD: 5.4 % (ref 0–5.6)
HBV SURFACE AB SERPL IA-ACNC: 0.43 M[IU]/ML
HBV SURFACE AB SERPL IA-ACNC: NONREACTIVE M[IU]/ML
HDLC SERPL-MCNC: 33 MG/DL
LDLC SERPL CALC-MCNC: 87 MG/DL
NONHDLC SERPL-MCNC: 114 MG/DL
POTASSIUM SERPL-SCNC: 3.7 MMOL/L (ref 3.4–5.3)
PSA SERPL DL<=0.01 NG/ML-MCNC: 0.26 NG/ML (ref 0–3.5)
SODIUM SERPL-SCNC: 142 MMOL/L (ref 136–145)
TRIGL SERPL-MCNC: 135 MG/DL

## 2023-03-28 PROCEDURE — 83036 HEMOGLOBIN GLYCOSYLATED A1C: CPT | Performed by: FAMILY MEDICINE

## 2023-03-28 PROCEDURE — 36415 COLL VENOUS BLD VENIPUNCTURE: CPT | Performed by: FAMILY MEDICINE

## 2023-03-28 PROCEDURE — 80061 LIPID PANEL: CPT | Performed by: FAMILY MEDICINE

## 2023-03-28 PROCEDURE — 86706 HEP B SURFACE ANTIBODY: CPT | Performed by: FAMILY MEDICINE

## 2023-03-28 PROCEDURE — 99396 PREV VISIT EST AGE 40-64: CPT | Performed by: FAMILY MEDICINE

## 2023-03-28 PROCEDURE — G0103 PSA SCREENING: HCPCS | Performed by: FAMILY MEDICINE

## 2023-03-28 PROCEDURE — 82306 VITAMIN D 25 HYDROXY: CPT | Performed by: FAMILY MEDICINE

## 2023-03-28 PROCEDURE — 84460 ALANINE AMINO (ALT) (SGPT): CPT | Performed by: FAMILY MEDICINE

## 2023-03-28 PROCEDURE — 99214 OFFICE O/P EST MOD 30 MIN: CPT | Mod: 25 | Performed by: FAMILY MEDICINE

## 2023-03-28 PROCEDURE — 80048 BASIC METABOLIC PNL TOTAL CA: CPT | Performed by: FAMILY MEDICINE

## 2023-03-28 RX ORDER — SEMAGLUTIDE 2.4 MG/.75ML
2.4 INJECTION, SOLUTION SUBCUTANEOUS WEEKLY
Qty: 3 ML | Refills: 11 | Status: SHIPPED | OUTPATIENT
Start: 2023-03-28 | End: 2024-02-20

## 2023-03-28 RX ORDER — HYDROCHLOROTHIAZIDE 12.5 MG/1
1 CAPSULE ORAL DAILY
Qty: 90 CAPSULE | Refills: 3 | Status: SHIPPED | OUTPATIENT
Start: 2023-03-28 | End: 2024-02-20

## 2023-03-28 RX ORDER — CIPROFLOXACIN 500 MG/1
500 TABLET, FILM COATED ORAL 2 TIMES DAILY
Qty: 6 TABLET | Refills: 0 | Status: SHIPPED | OUTPATIENT
Start: 2023-03-28 | End: 2023-03-31

## 2023-03-28 RX ORDER — VALSARTAN AND HYDROCHLOROTHIAZIDE 160; 25 MG/1; MG/1
1 TABLET ORAL DAILY
Qty: 90 TABLET | Refills: 3 | Status: SHIPPED | OUTPATIENT
Start: 2023-03-28 | End: 2024-02-20

## 2023-03-28 ASSESSMENT — ENCOUNTER SYMPTOMS
NAUSEA: 0
WEAKNESS: 0
CONSTIPATION: 0
ARTHRALGIAS: 1
PALPITATIONS: 0
FEVER: 0
HEMATOCHEZIA: 0
EYE PAIN: 0
SHORTNESS OF BREATH: 0
MYALGIAS: 0
FREQUENCY: 0
CHILLS: 0
PARESTHESIAS: 0
COUGH: 0
NERVOUS/ANXIOUS: 0
DIARRHEA: 0
HEADACHES: 0
JOINT SWELLING: 0
SORE THROAT: 0
HEMATURIA: 0
ABDOMINAL PAIN: 0
HEARTBURN: 0
DYSURIA: 0
DIZZINESS: 0

## 2023-03-28 NOTE — ASSESSMENT & PLAN NOTE
Up-to-date with preventative health recommendations.  Screening colonoscopy should be completed in 2024.  Discussed potential benefits and risks of prostate cancer screening.  He did elect to proceed with PSA.  He will be traveling to [place] this summer.  Ciprofloxacin prescribed for traveler's diarrhea.  Discussed that he may benefit from typhoid vaccine.  Based on today's interaction, I believe his overall health has improved.  Recheck in 6-12 months (for weight and blood pressure).

## 2023-03-28 NOTE — PROGRESS NOTES
SUBJECTIVE:   CC: Attila is an 52 year old who presents for preventative health visit.   Additional Questions 3/28/2023   Roomed by SAC   Accompanied by self     Patient Reported Additional Medications 3/28/2023   Patient reports taking the following new medications no     Chief Complaint   Patient presents with     Physical     Pt. Fasting.     Weight:   - Patient has found semaglutide to be helpful.  Minimal side effects.  He finds that this helps decrease overall intake.  This is the lowest weight he has been since 2015.  Energy high.  Added resistance training at a local gym.    Blood pressure: No lightheadedness.  No dizziness.  No fainting.  Swelling has been minimal.      Patient has been advised of split billing requirements and indicates understanding: Yes  Healthy Habits:     Getting at least 3 servings of Calcium per day:  Yes    Bi-annual eye exam:  NO    Dental care twice a year:  Yes    Sleep apnea or symptoms of sleep apnea:  None    Diet:  Regular (no restrictions)    Frequency of exercise:  2-3 days/week    Duration of exercise:  15-30 minutes    Taking medications regularly:  Yes    PHQ-2 Total Score: 0    Additional concerns today:  No      Today's PHQ-2 Score:   PHQ-2 ( 1999 Pf[employer]) 3/27/2023   Q1: Little interest or pleasure in doing things 0   Q2: Feeling down, depressed or hopeless 0   PHQ-2 Score 0   PHQ-2 Total Score (12-17 Years)- Positive if 3 or more points; Administer PHQ-A if positive -   Q1: Little interest or pleasure in doing things Not at all   Q2: Feeling down, depressed or hopeless Not at all   PHQ-2 Score 0     Social History     Tobacco Use     Smoking status: Never     Smokeless tobacco: Never   Substance Use Topics     Alcohol use: No     Alcohol Use 3/27/2023   Prescreen: >3 drinks/day or >7 drinks/week? Not Applicable       Last PSA: No results found for: PSA    Reviewed orders with patient. Reviewed health maintenance and updated orders accordingly - Yes    Reviewed and  "updated as needed this visit by clinical staff   Tobacco  Allergies  Meds  Problems  Med Hx  Surg Hx  Fam Hx          Reviewed and updated as needed this visit by Provider   Tobacco  Allergies  Meds  Problems  Med Hx  Surg Hx  Fam Hx           Review of Systems   Constitutional: Negative for chills and fever.   HENT: Negative for congestion, ear pain, hearing loss and sore throat.    Eyes: Negative for pain and visual disturbance.   Respiratory: Negative for cough and shortness of breath.    Cardiovascular: Negative for chest pain, palpitations and peripheral edema.   Gastrointestinal: Negative for abdominal pain, constipation, diarrhea, heartburn, hematochezia and nausea.   Genitourinary: Negative for dysuria, frequency, genital sores, hematuria, impotence, penile discharge and urgency.   Musculoskeletal: Positive for arthralgias. Negative for joint swelling and myalgias.   Skin: Negative for rash.   Neurological: Negative for dizziness, weakness, headaches and paresthesias.   Psychiatric/Behavioral: Negative for mood changes. The patient is not nervous/anxious.          OBJECTIVE:   /79 (BP Location: Left arm, Patient Position: Sitting, Cuff Size: Adult Large)   Pulse 77   Temp 97.5  F (36.4  C) (Oral)   Resp 16   Ht 1.847 m (6' 0.7\")   Wt 140.8 kg (310 lb 5 oz)   SpO2 97%   BMI 41.28 kg/m      GLP starting weight 348.  Down 10.9%    Physical Exam  Vitals reviewed.   Constitutional:       General: He is not in acute distress.     Appearance: Normal appearance. He is not ill-appearing.   HENT:      Head: Normocephalic and atraumatic.      Right Ear: External ear normal.      Left Ear: External ear normal.      Nose: Nose normal.      Mouth/Throat:      Pharynx: Oropharynx is clear. No oropharyngeal exudate or posterior oropharyngeal erythema.   Eyes:      General: No scleral icterus.        Right eye: No discharge.         Left eye: No discharge.      Extraocular Movements: Extraocular " movements intact.      Conjunctiva/sclera: Conjunctivae normal.      Pupils: Pupils are equal, round, and reactive to light.   Neck:      Comments: No thyromegaly.  Cardiovascular:      Rate and Rhythm: Normal rate and regular rhythm.      Heart sounds: Normal heart sounds. No murmur heard.    No friction rub. No gallop.   Pulmonary:      Effort: Pulmonary effort is normal. No respiratory distress.      Breath sounds: Normal breath sounds. No wheezing or rales.   Abdominal:      General: There is no distension.      Palpations: Abdomen is soft. There is no mass.      Tenderness: There is no abdominal tenderness.   Musculoskeletal:         General: No signs of injury. Normal range of motion.      Cervical back: Normal range of motion.      Right lower leg: No edema.      Left lower leg: No edema.   Lymphadenopathy:      Cervical: No cervical adenopathy.   Skin:     General: Skin is warm.      Coloration: Skin is not jaundiced.      Findings: No rash.      Comments: Raised papules throughout back.    Neurological:      General: No focal deficit present.      Mental Status: He is alert and oriented to person, place, and time.      Cranial Nerves: No cranial nerve deficit.      Deep Tendon Reflexes: Reflexes normal.   Psychiatric:         Mood and Affect: Mood normal.         Diagnostic Test Results:  Labs reviewed in Epic    ASSESSMENT/PLAN:     Problem List Items Addressed This Visit     Avitaminosis D    Relevant Orders    Vitamin D Deficiency    Benign essential hypertension     Blood pressure well controlled today.  At this point, I think there is a possibility that we can eliminate the additional 12.5 mg of hydrochlorothiazide (primarily taken for lower extremity swelling).  Refill sent of valsartan-hydrochlorothiazide.  Ongoing weight loss.  Check basic metabolic panel.         Relevant Medications    valsartan-hydrochlorothiazide (DIOVAN HCT) 160-25 MG tablet    hydrochlorothiazide (MICROZIDE) 12.5 MG capsule     Other Relevant Orders    Basic metabolic panel  (Ca, Cl, CO2, Creat, Gluc, K, Na, BUN)    Class 3 severe obesity due to excess calories with serious comorbidity and body mass index (BMI) of 40.0 to 44.9 in adult (H)     52 year old year old male in clinic today to discuss treatment of the following conditions through diet and lifestyle modification and weight loss:  1. Encounter for routine adult health examination without abnormal findings    2. Benign essential hypertension    3. Class 3 severe obesity due to excess calories with serious comorbidity and body mass index (BMI) of 40.0 to 44.9 in adult (H)    4. Screening for endocrine, nutritional, metabolic and immunity disorder    5. Prediabetes    6. Metabolic syndrome    7. Avitaminosis D    8. Dyslipidemia    9. Hepatitis B vaccination status unknown    10. Travel advice encounter    11. Screening for prostate cancer      The patient's weight loss result since the last visit was successful based on ongoing weight loss.  He is down approximately 11% since starting a GLP-1 receptor agonist.  Tolerant of Wegovy (previously intolerant to Saxenda).  He is part of a structured program.  He is eating a calorically restricted diet.  He is adding physical activity; specifically resistance training.  His food is generally prepared in the home and nutritious.  - Continue Wegovy at current dose.  - Metabolic labs today.  His hemoglobin A1c was 6.4% 12 months ago.  Now it is 5.4%.  Great work!  - Follow-up in 3-6 months.         Relevant Medications    Semaglutide-Weight Management (WEGOVY) 2.4 MG/0.75ML pen    Other Relevant Orders    ALT    Dyslipidemia    Relevant Orders    Lipid panel reflex to direct LDL Fasting    Encounter for routine adult health examination without abnormal findings - Primary     Up-to-date with preventative health recommendations.  Screening colonoscopy should be completed in 2024.  Discussed potential benefits and risks of prostate cancer  "screening.  He did elect to proceed with PSA.  He will be traveling to [facility] this summer.  Ciprofloxacin prescribed for traveler's diarrhea.  Discussed that he may benefit from typhoid vaccine.  Based on today's interaction, I believe his overall health has improved.  Recheck in 6-12 months (for weight and blood pressure).         Metabolic syndrome    Prediabetes    Relevant Orders    Hemoglobin A1c (Completed)   Other Visit Diagnoses     Screening for endocrine, nutritional, metabolic and immunity disorder        Hepatitis B vaccination status unknown        Relevant Orders    Hepatitis B Surface Antibody    Travel advice encounter        Relevant Medications    ciprofloxacin (CIPRO) 500 MG tablet    Screening for prostate cancer        Relevant Orders    PSA, screen          Patient has been advised of split billing requirements and indicates understanding: Yes      COUNSELING:   Reviewed preventive health counseling, as reflected in patient instructions       Regular exercise       Healthy diet/nutrition      BMI:   Estimated body mass index is 41.28 kg/m  as calculated from the following:    Height as of this encounter: 1.847 m (6' 0.7\").    Weight as of this encounter: 140.8 kg (310 lb 5 oz).   Weight management plan: Discussed healthy diet and exercise guidelines Specific weight management program called Comprehensive Weight Management discussed      He reports that he has never smoked. He has never used smokeless tobacco.    Johnson Jeffrey MD  [facility]  "

## 2023-03-28 NOTE — ASSESSMENT & PLAN NOTE
Blood pressure well controlled today.  At this point, I think there is a possibility that we can eliminate the additional 12.5 mg of hydrochlorothiazide (primarily taken for lower extremity swelling).  Refill sent of valsartan-hydrochlorothiazide.  Ongoing weight loss.  Check basic metabolic panel.

## 2023-03-28 NOTE — ASSESSMENT & PLAN NOTE
52 year old year old male in clinic today to discuss treatment of the following conditions through diet and lifestyle modification and weight loss:  1. Encounter for routine adult health examination without abnormal findings    2. Benign essential hypertension    3. Class 3 severe obesity due to excess calories with serious comorbidity and body mass index (BMI) of 40.0 to 44.9 in adult (H)    4. Screening for endocrine, nutritional, metabolic and immunity disorder    5. Prediabetes    6. Metabolic syndrome    7. Avitaminosis D    8. Dyslipidemia    9. Hepatitis B vaccination status unknown    10. Travel advice encounter    11. Screening for prostate cancer      The patient's weight loss result since the last visit was successful based on ongoing weight loss.  He is down approximately 11% since starting a GLP-1 receptor agonist.  Tolerant of Wegovy (previously intolerant to Saxenda).  He is part of a structured program.  He is eating a calorically restricted diet.  He is adding physical activity; specifically resistance training.  His food is generally prepared in the home and nutritious.  - Continue Wegovy at current dose.  - Metabolic labs today.  His hemoglobin A1c was 6.4% 12 months ago.  Now it is 5.4%.  Great work!  - Follow-up in 3-6 months.

## 2023-07-10 ENCOUNTER — MYC MEDICAL ADVICE (OUTPATIENT)
Dept: FAMILY MEDICINE | Facility: CLINIC | Age: 53
End: 2023-07-10
Payer: COMMERCIAL

## 2023-07-19 ENCOUNTER — MYC MEDICAL ADVICE (OUTPATIENT)
Dept: FAMILY MEDICINE | Facility: CLINIC | Age: 53
End: 2023-07-19
Payer: COMMERCIAL

## 2023-07-20 ENCOUNTER — TELEPHONE (OUTPATIENT)
Dept: FAMILY MEDICINE | Facility: CLINIC | Age: 53
End: 2023-07-20

## 2023-07-20 NOTE — TELEPHONE ENCOUNTER
Central Prior Authorization Team  Phone: [phone]    PA Initiation    Medication: WEGOVY 2.4 MG/0.75ML SC SOAJ  Insurance Company: [employer] ([facility]) - Phone [phone] Fax [phone]  Pharmacy Filling the Rx: [facility] #[id number] [place] [address] - [facility] [address]  Filling Pharmacy Phone: [phone]  Filling Pharmacy Fax:    Start Date: 7/20/2023

## 2023-07-20 NOTE — TELEPHONE ENCOUNTER
Prior Authorization Approval    Medication: WEGOVY 2.4 MG/0.75ML SC SOAJ  Authorization Effective Date: 7/20/2023  Authorization Expiration Date: 11/20/2023  Approved Dose/Quantity:   Reference #:     Insurance Company: [employer] ([facility]) - Phone [phone] Fax [phone]  Expected CoPay:       CoPay Card Available:      Financial Assistance Needed:   Which Pharmacy is filling the prescription: [facility] #[id number] - [place], [place] - [address] [place] [address]  Pharmacy Notified: Yes  Patient Notified:  **Instructed pharmacy to notify patient when script is ready to /ship.**

## 2023-07-20 NOTE — TELEPHONE ENCOUNTER
Prior Authorization Request  Who s requesting:  Patient  Pharmacy Name and Location: [place] [address]  Medication Name: Semaglutide-Weight Management (WEGOVY) 2.4 MG/0.75ML pen  Insurance Plan: [facility]  Insurance Member ID Number:  [id number]  CoverMyMeds Key:   Informed patient that prior authorizations can take up to 10 business days for response:   Yes  Okay to leave a detailed message: Yes    Sent high priority for medication continuation.     Route to pool:  [facility]

## 2023-07-20 NOTE — TELEPHONE ENCOUNTER
Spoke with patient to verify new insurance information. Informed patient we will be submitting PA.    PA initiated in separate encounter.

## 2023-10-02 ENCOUNTER — MYC MEDICAL ADVICE (OUTPATIENT)
Dept: FAMILY MEDICINE | Facility: CLINIC | Age: 53
End: 2023-10-02
Payer: COMMERCIAL

## 2023-10-04 NOTE — CONFIDENTIAL NOTE
Please move this patient up on the schedule by several weeks.  Make sure that we resubmit for insurance coverage with his current weight before his approval expires.

## 2023-10-26 ENCOUNTER — OFFICE VISIT (OUTPATIENT)
Dept: FAMILY MEDICINE | Facility: CLINIC | Age: 53
End: 2023-10-26
Payer: COMMERCIAL

## 2023-10-26 VITALS
HEART RATE: 91 BPM | DIASTOLIC BLOOD PRESSURE: 81 MMHG | SYSTOLIC BLOOD PRESSURE: 118 MMHG | HEIGHT: 73 IN | OXYGEN SATURATION: 96 % | BODY MASS INDEX: 39.97 KG/M2 | WEIGHT: 301.6 LBS | TEMPERATURE: 98.3 F | RESPIRATION RATE: 16 BRPM

## 2023-10-26 DIAGNOSIS — E88.810 METABOLIC SYNDROME: ICD-10-CM

## 2023-10-26 DIAGNOSIS — R06.83 SNORES: ICD-10-CM

## 2023-10-26 DIAGNOSIS — E66.01 CLASS 3 SEVERE OBESITY DUE TO EXCESS CALORIES WITH SERIOUS COMORBIDITY AND BODY MASS INDEX (BMI) OF 40.0 TO 44.9 IN ADULT (H): Primary | ICD-10-CM

## 2023-10-26 DIAGNOSIS — R73.03 PREDIABETES: ICD-10-CM

## 2023-10-26 DIAGNOSIS — I10 BENIGN ESSENTIAL HYPERTENSION: ICD-10-CM

## 2023-10-26 DIAGNOSIS — E78.5 DYSLIPIDEMIA: ICD-10-CM

## 2023-10-26 DIAGNOSIS — E66.813 CLASS 3 SEVERE OBESITY DUE TO EXCESS CALORIES WITH SERIOUS COMORBIDITY AND BODY MASS INDEX (BMI) OF 40.0 TO 44.9 IN ADULT (H): Primary | ICD-10-CM

## 2023-10-26 PROCEDURE — 90480 ADMN SARSCOV2 VAC 1/ONLY CMP: CPT | Performed by: FAMILY MEDICINE

## 2023-10-26 PROCEDURE — 90471 IMMUNIZATION ADMIN: CPT | Performed by: FAMILY MEDICINE

## 2023-10-26 PROCEDURE — 90682 RIV4 VACC RECOMBINANT DNA IM: CPT | Performed by: FAMILY MEDICINE

## 2023-10-26 PROCEDURE — 99213 OFFICE O/P EST LOW 20 MIN: CPT | Mod: 25 | Performed by: FAMILY MEDICINE

## 2023-10-26 PROCEDURE — 91320 SARSCV2 VAC 30MCG TRS-SUC IM: CPT | Performed by: FAMILY MEDICINE

## 2023-10-26 NOTE — PROGRESS NOTES
"  Assessment & Plan   Problem List Items Addressed This Visit       Benign essential hypertension     No hypotensive symptoms.          Class 3 severe obesity due to excess calories with serious comorbidity and body mass index (BMI) of 40.0 to 44.9 in adult (H) - Primary     53 year old year old male in clinic today to discuss treatment of the following conditions through diet and lifestyle modification and weight loss:  1. Class 3 severe obesity due to excess calories with serious comorbidity and body mass index (BMI) of 40.0 to 44.9 in adult (H)    2. Dyslipidemia    3. Metabolic syndrome    4. Prediabetes    5. Snores    The patient's weight loss result since the last visit was successful based on weight loss and positive mentality.  He is consistent with nutrition.  Generally feels well. Clothes are fittin better     Current therapies:    Medications: YES    - Starting weight for GLP1 receptor agonist: 348   - Total weight loss: 47 lbs (22%)    Nutritional goals/strategies: reviewed.   - caloric restriction: Yes   - time restriction: NO   - diet (macronutrient) framework: high protein/low carbohydrate     Movement:   - resistance training    Follow-up: 6 months           Dyslipidemia    Metabolic syndrome    Prediabetes    Snores             BMI:   Estimated body mass index is 40.12 kg/m  as calculated from the following:    Height as of this encounter: 1.847 m (6' 0.7\").    Weight as of this encounter: 136.8 kg (301 lb 9.6 oz).   Weight management plan: Specific weight management program called Comprehensive weight managed discussed    Johnson Jeffrey MD  [facility] GIAN Aiken is a 53 year old, presenting for the following health issues:  Weight Loss (Follow-up )        10/26/2023    10:04 AM   Additional Questions   Roomed by albin       Patient presents for treatment of chronic, comorbid conditions using intensive therapeutic lifestyle interventions including nutrition, " "physical activity, and behavior management.   - successes: generally feels well.  Finds the medication to be helpful   - struggles: less enjoyment for food   - exercise plan: working out   - tracking/journaling: ad zach.  Calorically restictured. Generally focuses on protein content.    - following nutritional plan: yes.  Deviations from plan: infrequent.   - hunger: controlled.   - medication benefits: helpful. side effects: \"nothing sounds good.\"          Review of Systems         Objective    /81 (BP Location: Left arm, Patient Position: Sitting, Cuff Size: Adult Large)   Pulse 91   Temp 98.3  F (36.8  C) (Oral)   Resp 16   Ht 1.847 m (6' 0.7\")   Wt 136.8 kg (301 lb 9.6 oz)   SpO2 96%   BMI 40.12 kg/m    Body mass index is 40.12 kg/m .  Physical Exam  Nursing note reviewed.   Constitutional:       General: He is not in acute distress.     Appearance: Normal appearance. He is not ill-appearing.   HENT:      Head: Normocephalic and atraumatic.   Eyes:      Extraocular Movements: Extraocular movements intact.      Conjunctiva/sclera: Conjunctivae normal.   Pulmonary:      Effort: Pulmonary effort is normal.   Neurological:      Mental Status: He is alert and oriented to person, place, and time.   Psychiatric:         Attention and Perception: Attention normal.         Mood and Affect: Mood normal.         Speech: Speech normal.         Thought Content: Thought content normal.                          "

## 2023-10-26 NOTE — ASSESSMENT & PLAN NOTE
53 year old year old male in clinic today to discuss treatment of the following conditions through diet and lifestyle modification and weight loss:  1. Class 3 severe obesity due to excess calories with serious comorbidity and body mass index (BMI) of 40.0 to 44.9 in adult (H)    2. Dyslipidemia    3. Metabolic syndrome    4. Prediabetes    5. Snores      The patient's weight loss result since the last visit was successful based on weight loss and positive mentality.  He is consistent with nutrition.  Generally feels well. Clothes are fittin better     Current therapies:    Medications: YES    - Starting weight for GLP1 receptor agonist: 348   - Total weight loss: 47 lbs (22%)    Nutritional goals/strategies: reviewed.   - caloric restriction: Yes   - time restriction: NO   - diet (macronutrient) framework: high protein/low carbohydrate     Movement:   - resistance training    Follow-up: 6 months

## 2023-12-05 ENCOUNTER — TELEPHONE (OUTPATIENT)
Dept: FAMILY MEDICINE | Facility: CLINIC | Age: 53
End: 2023-12-05
Payer: COMMERCIAL

## 2023-12-05 NOTE — TELEPHONE ENCOUNTER
Prior Authorization Request  Who s requesting:  Patient  Pharmacy Name and Location: [facility] #[id number]  Medication Name: Semaglutide-Weight Management (WEGOVY) 2.4 MG/0.75ML pen  Insurance Plan: [facility]  Insurance Member ID Number:  [id number]  CoverMyMeds Key: NA  Informed patient that prior authorizations can take up to 10 business days for response:   Yes  Okay to leave a detailed message: Yes     Sent high priority for medication continuation.      Route to pool:  [facility]

## 2023-12-05 NOTE — TELEPHONE ENCOUNTER
Prior Authorization Approval    Authorization Effective Date: 12/5/2023  Authorization Expiration Date: 4/5/2024  Medication: Wegovy 2.4MG/0.75ML auto-injectors  Reference #:     Insurance Company: Dom ([facility]) - Phone [phone] Fax [phone]  Which Pharmacy is filling the prescription (Not needed for infusion/clinic administered): [facility] #[id number] - [place], [place] - [address] [facility]  Pharmacy Notified: Yes  Patient Notified: Instructed pharmacy to notify patient when script is ready to /ship.

## 2023-12-05 NOTE — TELEPHONE ENCOUNTER
Central Prior Authorization Team   Phone: [phone]    PA Initiation    Medication: Wegovy 2.4MG/0.75ML auto-injectors  Insurance Company: [employer] ([facility]) - Phone [phone] Fax [phone]  Pharmacy Filling the Rx: [facility] #[id number] [place] [address] - [facility] [address]  Filling Pharmacy Phone: [phone]  Filling Pharmacy Fax:    Start Date: 12/5/2023

## 2024-01-29 ENCOUNTER — TRANSFERRED RECORDS (OUTPATIENT)
Dept: HEALTH INFORMATION MANAGEMENT | Facility: CLINIC | Age: 54
End: 2024-01-29
Payer: COMMERCIAL

## 2024-02-08 ENCOUNTER — TRANSFERRED RECORDS (OUTPATIENT)
Dept: HEALTH INFORMATION MANAGEMENT | Facility: CLINIC | Age: 54
End: 2024-02-08
Payer: COMMERCIAL

## 2024-02-20 ENCOUNTER — OFFICE VISIT (OUTPATIENT)
Dept: FAMILY MEDICINE | Facility: CLINIC | Age: 54
End: 2024-02-20
Payer: COMMERCIAL

## 2024-02-20 VITALS
SYSTOLIC BLOOD PRESSURE: 121 MMHG | RESPIRATION RATE: 16 BRPM | DIASTOLIC BLOOD PRESSURE: 79 MMHG | HEART RATE: 87 BPM | WEIGHT: 298.2 LBS | HEIGHT: 73 IN | BODY MASS INDEX: 39.52 KG/M2 | OXYGEN SATURATION: 96 % | TEMPERATURE: 98 F

## 2024-02-20 DIAGNOSIS — Z01.818 PREOP GENERAL PHYSICAL EXAM: Primary | ICD-10-CM

## 2024-02-20 DIAGNOSIS — E66.812 CLASS 2 SEVERE OBESITY DUE TO EXCESS CALORIES WITH SERIOUS COMORBIDITY AND BODY MASS INDEX (BMI) OF 39.0 TO 39.9 IN ADULT (H): ICD-10-CM

## 2024-02-20 DIAGNOSIS — E66.01 CLASS 2 SEVERE OBESITY DUE TO EXCESS CALORIES WITH SERIOUS COMORBIDITY AND BODY MASS INDEX (BMI) OF 39.0 TO 39.9 IN ADULT (H): ICD-10-CM

## 2024-02-20 DIAGNOSIS — R06.83 SNORES: ICD-10-CM

## 2024-02-20 DIAGNOSIS — I10 BENIGN ESSENTIAL HYPERTENSION: ICD-10-CM

## 2024-02-20 DIAGNOSIS — M54.16 RIGHT LUMBAR RADICULOPATHY: ICD-10-CM

## 2024-02-20 LAB
ERYTHROCYTE [DISTWIDTH] IN BLOOD BY AUTOMATED COUNT: 13.3 % (ref 10–15)
HBA1C MFR BLD: 5.3 % (ref 0–5.6)
HCT VFR BLD AUTO: 46.4 % (ref 40–53)
HGB BLD-MCNC: 15.6 G/DL (ref 13.3–17.7)
MCH RBC QN AUTO: 29.4 PG (ref 26.5–33)
MCHC RBC AUTO-ENTMCNC: 33.6 G/DL (ref 31.5–36.5)
MCV RBC AUTO: 87 FL (ref 78–100)
PLATELET # BLD AUTO: 226 10E3/UL (ref 150–450)
RBC # BLD AUTO: 5.31 10E6/UL (ref 4.4–5.9)
WBC # BLD AUTO: 6.1 10E3/UL (ref 4–11)

## 2024-02-20 PROCEDURE — 85027 COMPLETE CBC AUTOMATED: CPT | Performed by: FAMILY MEDICINE

## 2024-02-20 PROCEDURE — 83036 HEMOGLOBIN GLYCOSYLATED A1C: CPT | Performed by: FAMILY MEDICINE

## 2024-02-20 PROCEDURE — 36415 COLL VENOUS BLD VENIPUNCTURE: CPT | Performed by: FAMILY MEDICINE

## 2024-02-20 PROCEDURE — 99214 OFFICE O/P EST MOD 30 MIN: CPT | Performed by: FAMILY MEDICINE

## 2024-02-20 PROCEDURE — 80048 BASIC METABOLIC PNL TOTAL CA: CPT | Performed by: FAMILY MEDICINE

## 2024-02-20 RX ORDER — HYDROCHLOROTHIAZIDE 12.5 MG/1
1 CAPSULE ORAL DAILY
Qty: 90 CAPSULE | Refills: 3 | Status: SHIPPED | OUTPATIENT
Start: 2024-02-20

## 2024-02-20 RX ORDER — VALSARTAN AND HYDROCHLOROTHIAZIDE 160; 25 MG/1; MG/1
1 TABLET ORAL DAILY
Qty: 90 TABLET | Refills: 3 | Status: SHIPPED | OUTPATIENT
Start: 2024-02-20

## 2024-02-20 RX ORDER — SEMAGLUTIDE 2.4 MG/.75ML
2.4 INJECTION, SOLUTION SUBCUTANEOUS WEEKLY
Qty: 3 ML | Refills: 11 | Status: SHIPPED | OUTPATIENT
Start: 2024-02-20

## 2024-02-20 ASSESSMENT — ENCOUNTER SYMPTOMS
BRUISES/BLEEDS EASILY: 0
DIZZINESS: 0
FREQUENCY: 0
DIARRHEA: 0
DYSURIA: 0
NERVOUS/ANXIOUS: 0
PALPITATIONS: 0
ABDOMINAL PAIN: 0
FEVER: 0
CHILLS: 0
COUGH: 0
CONSTIPATION: 0
WEAKNESS: 0
ALLERGIC/IMMUNOLOGIC NEGATIVE: 1
MYALGIAS: 0
SHORTNESS OF BREATH: 0
ARTHRALGIAS: 0
SORE THROAT: 0
PARESTHESIAS: 0
EYE PAIN: 0
HEADACHES: 0

## 2024-02-20 NOTE — ASSESSMENT & PLAN NOTE
53 year old year old male in clinic today to discuss treatment of the following conditions through diet and lifestyle modification and weight loss:  1. Right lumbar radiculopathy    2. Preop general physical exam      The patient's weight loss result since the last visit was successful based on ongoing weight loss.  He continues to eat well.  Exercise limited by back pain     BMI: Body mass index is 39.67 kg/m .  Ideal body weight: 79.2 kg (174 lb 10 oz)  Adjusted ideal body weight: 101.6 kg (224 lb 0.9 oz)    Current therapies:    Medications: YES Saxenda was not toelrated in 2020.  Wegovy since March 2022   - Starting weight for GLP1 receptor agonist: 348 lb   - Total weight loss: 48 lbs (14.4%)    Nutritional goals/strategies: reviewed.   - caloric restriction: Yes   - time restriction: NO   - diet (macronutrient) framework: high protein/low carbohydrate    Recheck 6 months

## 2024-02-20 NOTE — ASSESSMENT & PLAN NOTE
Right-sided radiculopathy symptoms have been ongoing.  He was seen by a local spine Ortho surgeon who proposed surgical intervention.  He is able to perform greater than 4 metabolic equivalents.  No underlying history of heart disease, stroke, kidney failure, insulin-dependent diabetes.  He has done well with surgeries in the past.  Labs were drawn: BMP and CBC.  No contraindication to the proposed surgery.  Cleared for surgery without additional recommendations of diagnostic testing or evaluation.

## 2024-02-20 NOTE — PROGRESS NOTES
Preoperative Evaluation  [facility]  [address]  [facility] [address]  Phone: [phone]  Fax: [phone]  Primary Provider: Johnson Desai  Pre-op Performing Provider: JOHNSON DESAI  Feb 20, 2024     Attila is a 53 year old, presenting for the following:  Pre-Op Exam        2/20/2024     8:34 AM   Additional Questions   Roomed by XL     Surgical Information  Surgery/Procedure: Right L5-S1 Lateral Recess Decompression  Surgery Location: [facility]  Surgeon: Gerson Vizcarra M.D.  Surgery Date: 3/1/24  Time of Surgery: TBD  Where patient plans to recover: At home with family  Fax number for surgical facility: [phone]    Assessment & Plan     The proposed surgical procedure is considered INTERMEDIATE risk.    Problem List Items Addressed This Visit       Benign essential hypertension    Relevant Medications    Semaglutide-Weight Management (WEGOVY) 2.4 MG/0.75ML pen    valsartan-hydrochlorothiazide (DIOVAN HCT) 160-25 MG tablet    hydrochlorothiazide (MICROZIDE) 12.5 MG capsule    Class 2 severe obesity due to excess calories with serious comorbidity and body mass index (BMI) of 39.0 to 39.9 in adult (H)     53 year old year old male in clinic today to discuss treatment of the following conditions through diet and lifestyle modification and weight loss:  1. Right lumbar radiculopathy    2. Preop general physical exam    The patient's weight loss result since the last visit was successful based on ongoing weight loss.  He continues to eat well.  Exercise limited by back pain     BMI: Body mass index is 39.67 kg/m .  Ideal body weight: 79.2 kg (174 lb 10 oz)  Adjusted ideal body weight: 101.6 kg (224 lb 0.9 oz)    Current therapies:    Medications: YES Saxenda was not toelrated in 2020.  Wegovy since March 2022   - Starting weight for GLP1 receptor agonist: 348 lb   - Total weight loss: 48 lbs (14.4%)    Nutritional goals/strategies:  "reviewed.   - caloric restriction: Yes   - time restriction: NO   - diet (macronutrient) framework: high protein/low carbohydrate    Recheck 6 months         Relevant Medications    Semaglutide-Weight Management (WEGOVY) 2.4 MG/0.75ML pen    valsartan-hydrochlorothiazide (DIOVAN HCT) 160-25 MG tablet    hydrochlorothiazide (MICROZIDE) 12.5 MG capsule    Right lumbar radiculopathy     Right-sided radiculopathy symptoms have been ongoing.  He was seen by a local spine Ortho surgeon who proposed surgical intervention.  He is able to perform greater than 4 metabolic equivalents.  No underlying history of heart disease, stroke, kidney failure, insulin-dependent diabetes.  He has done well with surgeries in the past.  Labs were drawn: BMP and CBC.  No contraindication to the proposed surgery.  Cleared for surgery without additional recommendations of diagnostic testing or evaluation.         Relevant Orders    CBC with platelets (Completed)    Snores     Symptoms resolved with weight loss.  \"Sleep well and feel rested.\"           Other Visit Diagnoses       Preop general physical exam    -  Primary    Relevant Orders    Basic metabolic panel  (Ca, Cl, CO2, Creat, Gluc, K, Na, BUN)    Hemoglobin A1c (Completed)    CBC with platelets (Completed)             Risks and Recommendations  The patient has the following additional risks and recommendations for perioperative complications:   - No identified additional risk factors other than previously addressed    Antiplatelet or Anticoagulation Medication Instructions   - Patient is on no antiplatelet or anticoagulation medications.    Additional Medication Instructions   - GLP-1 Injectable (exenitide, liraglutide, semaglutide, dulaglutide, etc.): HOLD 7 days before surgery     Recommendation  APPROVAL GIVEN to proceed with proposed procedure, without further diagnostic evaluation.    Subjective       HPI related to upcoming procedure: History of back pain.  Right side " radiculopathy symptoms.  On gabapentin.  Otherwise doing well.         2/19/2024    12:27 PM   Preop Questions   1. Have you ever had a heart attack or stroke? No   2. Have you ever had surgery on your heart or blood vessels, such as a stent placement, a coronary artery bypass, or surgery on an artery in your head, neck, heart, or legs? No   3. Do you have chest pain with activity? No   4. Do you have a history of  heart failure? No   5. Do you currently have a cold, bronchitis or symptoms of other infection? No   6. Do you have a cough, shortness of breath, or wheezing? No   7. Do you or anyone in your family have previous history of blood clots? No   8. Do you or does anyone in your family have a serious bleeding problem such as prolonged bleeding following surgeries or cuts? No   9. Have you ever had problems with anemia or been told to take iron pills? No   10. Have you had any abnormal blood loss such as black, tarry or bloody stools? No   11. Have you ever had a blood transfusion? No   12. Are you willing to have a blood transfusion if it is medically needed before, during, or after your surgery? Yes   13. Have you or any of your relatives ever had problems with anesthesia? YES - mother with history of slow wakening (suc choline). The patient has not had any issues with past surgeries.    14. Do you have sleep apnea, excessive snoring or daytime drowsiness? No   15. Do you have any artifical heart valves or other implanted medical devices like a pacemaker, defibrillator, or continuous glucose monitor? No   16. Do you have artificial joints? No   17. Are you allergic to latex? No     Patient Active Problem List    Diagnosis Date Noted    Right lumbar radiculopathy 02/20/2024     Priority: Medium    Dyslipidemia 03/28/2023     Priority: Medium    Encounter for routine adult health examination without abnormal findings 03/18/2022     Priority: Medium    Family history of colon cancer      Priority: Medium     Peroneal tendonitis of right lower extremity 09/25/2019     Priority: Medium    Swelling 11/07/2018     Priority: Medium     Rationale for extra 12.5 mg of HCTZ.         Metabolic syndrome 10/08/2018     Priority: Medium    Avitaminosis D 10/08/2018     Priority: Medium    Class 2 severe obesity due to excess calories with serious comorbidity and body mass index (BMI) of 39.0 to 39.9 in adult (H) 10/04/2018     Priority: Medium    Benign essential hypertension 10/04/2018     Priority: Medium    Snores 10/04/2018     Priority: Medium    Prediabetes 05/21/2018     Priority: Medium      Past Medical History:   Diagnosis Date    Anxiety     COVID-19 01/22/2021    Hypertension     Sleep apnea      Past Surgical History:   Procedure Laterality Date    BACK SURGERY      COLONOSCOPY N/A 11/26/2019    Procedure: COLONOSCOPY;  Surgeon: Twin Jaramillo MD;  Location: [facility];  Service: General    KNEE SURGERY       Current Outpatient Medications   Medication Sig Dispense Refill    Cholecalciferol (VITAMIN D-3 PO)       hydrochlorothiazide (MICROZIDE) 12.5 MG capsule Take 1 capsule (12.5 mg) by mouth daily 90 capsule 3    Semaglutide-Weight Management (WEGOVY) 2.4 MG/0.75ML pen Inject 2.4 mg Subcutaneous once a week 3 mL 11    valsartan-hydrochlorothiazide (DIOVAN HCT) 160-25 MG tablet Take 1 tablet by mouth daily 90 tablet 3       Allergies   Allergen Reactions    Penicillins Other (See Comments) and Rash     Rash           Social History     Tobacco Use    Smoking status: Never    Smokeless tobacco: Never   Substance Use Topics    Alcohol use: No     History   Drug Use No         Review of Systems   Constitutional:  Negative for chills and fever.   HENT:  Negative for congestion, ear pain, hearing loss and sore throat.    Eyes:  Negative for pain and visual disturbance.   Respiratory:  Negative for cough and shortness of breath.    Cardiovascular:  Negative for chest pain, palpitations and peripheral edema.  "  Gastrointestinal:  Negative for abdominal pain, constipation and diarrhea.   Endocrine: Negative for polyuria.   Genitourinary:  Negative for dysuria and frequency.   Musculoskeletal:  Negative for arthralgias and myalgias.   Skin:  Negative for rash.   Allergic/Immunologic: Negative.    Neurological:  Negative for dizziness, weakness, headaches and paresthesias.   Hematological:  Does not bruise/bleed easily.   Psychiatric/Behavioral:  Negative for mood changes. The patient is not nervous/anxious.        Objective    /79 (BP Location: Left arm, Patient Position: Sitting, Cuff Size: Adult Large)   Pulse 87   Temp 98  F (36.7  C) (Oral)   Resp 16   Ht 1.847 m (6' 0.7\")   Wt 135.3 kg (298 lb 3.2 oz)   SpO2 96%   BMI 39.67 kg/m     Estimated body mass index is 39.67 kg/m  as calculated from the following:    Height as of this encounter: 1.847 m (6' 0.7\").    Weight as of this encounter: 135.3 kg (298 lb 3.2 oz).  Physical Exam  Vitals reviewed.   Constitutional:       General: He is not in acute distress.     Appearance: Normal appearance. He is not ill-appearing.   HENT:      Head: Normocephalic and atraumatic.      Right Ear: External ear normal.      Left Ear: External ear normal.      Nose: Nose normal.      Mouth/Throat:      Pharynx: Oropharynx is clear. No oropharyngeal exudate or posterior oropharyngeal erythema.   Eyes:      General: No scleral icterus.        Right eye: No discharge.         Left eye: No discharge.      Extraocular Movements: Extraocular movements intact.      Conjunctiva/sclera: Conjunctivae normal.      Pupils: Pupils are equal, round, and reactive to light.   Neck:      Comments: No thyromegaly.  Cardiovascular:      Rate and Rhythm: Normal rate and regular rhythm.      Heart sounds: Normal heart sounds. No murmur heard.     No friction rub. No gallop.   Pulmonary:      Effort: Pulmonary effort is normal. No respiratory distress.      Breath sounds: Normal breath sounds. No " wheezing or rales.   Abdominal:      General: There is no distension.      Palpations: Abdomen is soft. There is no mass.      Tenderness: There is no abdominal tenderness.   Musculoskeletal:         General: No signs of injury. Normal range of motion.      Cervical back: Normal range of motion.      Right lower leg: No edema.      Left lower leg: No edema.   Lymphadenopathy:      Cervical: No cervical adenopathy.   Skin:     General: Skin is warm.      Coloration: Skin is not jaundiced.      Findings: No rash.   Neurological:      General: No focal deficit present.      Mental Status: He is alert and oriented to person, place, and time.      Cranial Nerves: No cranial nerve deficit.      Deep Tendon Reflexes: Reflexes normal.   Psychiatric:         Mood and Affect: Mood normal.           Diagnostics  Recent Results (from the past 48 hour(s))   Basic metabolic panel  (Ca, Cl, CO2, Creat, Gluc, K, Na, BUN)    Collection Time: 02/20/24  8:31 AM   Result Value Ref Range    Sodium 142 135 - 145 mmol/L    Potassium 3.5 3.4 - 5.3 mmol/L    Chloride 103 98 - 107 mmol/L    Carbon Dioxide (CO2) 31 (H) 22 - 29 mmol/L    Anion Gap 8 7 - 15 mmol/L    Urea Nitrogen 13.0 6.0 - 20.0 mg/dL    Creatinine 1.03 0.67 - 1.17 mg/dL    GFR Estimate 86 >60 mL/min/1.73m2    Calcium 9.2 8.6 - 10.0 mg/dL    Glucose 121 (H) 70 - 99 mg/dL   Hemoglobin A1c    Collection Time: 02/20/24  8:31 AM   Result Value Ref Range    Hemoglobin A1C 5.3 0.0 - 5.6 %   CBC with platelets    Collection Time: 02/20/24  8:31 AM   Result Value Ref Range    WBC Count 6.1 4.0 - 11.0 10e3/uL    RBC Count 5.31 4.40 - 5.90 10e6/uL    Hemoglobin 15.6 13.3 - 17.7 g/dL    Hematocrit 46.4 40.0 - 53.0 %    MCV 87 78 - 100 fL    MCH 29.4 26.5 - 33.0 pg    MCHC 33.6 31.5 - 36.5 g/dL    RDW 13.3 10.0 - 15.0 %    Platelet Count 226 150 - 450 10e3/uL      No EKG required, no history of coronary heart disease, significant arrhythmia, peripheral arterial disease or other structural  heart disease.    Revised Cardiac Risk Index (RCRI)  The patient has the following serious cardiovascular risks for perioperative complications:   - No serious cardiac risks = 0 points     RCRI Interpretation: 0 points: Class I (very low risk - 0.4% complication rate)         Signed Electronically by: Johnson Jeffrey MD  Copy of this evaluation report is provided to requesting physician.

## 2024-02-20 NOTE — PATIENT INSTRUCTIONS
Preparing for Your Surgery  Getting started  A nurse will call you to review your health history and instructions. They will give you an arrival time based on your scheduled surgery time. Please be ready to share:  Your doctor's clinic name and phone number  Your medical, surgical, and anesthesia history  A list of allergies and sensitivities  A list of medicines, including herbal treatments and over-the-counter drugs  Whether the patient has a legal guardian (ask how to send us the papers in advance)  Please tell us if you're pregnant--or if there's any chance you might be pregnant. Some surgeries may injure a fetus (unborn baby), so they require a pregnancy test. Surgeries that are safe for a fetus don't always need a test, and you can choose whether to have one.   If you have a child who's having surgery, please ask for a copy of Preparing for Your Child's Surgery.    Preparing for surgery  Within 10 to 30 days of surgery: Have a pre-op exam (sometimes called an H&P, or History and Physical). This can be done at a clinic or pre-operative center.  If you're having a , you may not need this exam. Talk to your care team.  At your pre-op exam, talk to your care team about all medicines you take. If you need to stop any medicines before surgery, ask when to start taking them again.  We do this for your safety. Many medicines can make you bleed too much during surgery. Some change how well surgery (anesthesia) drugs work.  Call your insurance company to let them know you're having surgery. (If you don't have insurance, call [phone].)  Call your clinic if there's any change in your health. This includes signs of a cold or flu (sore throat, runny nose, cough, rash, fever). It also includes a scrape or scratch near the surgery site.  If you have questions on the day of surgery, call your hospital or surgery center.  Eating and drinking guidelines  For your safety: Unless your surgeon tells you otherwise,  follow the guidelines below.  Eat and drink as usual until 8 hours before you arrive for surgery. After that, no food or milk.  Drink clear liquids until 2 hours before you arrive. These are liquids you can see through, like water, Gatorade, and Propel Water. They also include plain black coffee and tea (no cream or milk), candy, and breath mints. You can spit out gum when you arrive.  If you drink alcohol: Stop drinking it the night before surgery.  If your care team tells you to take medicine on the morning of surgery, it's okay to take it with a sip of water.  Preventing infection  Shower or bathe the night before and morning of your surgery. Follow the instructions your clinic gave you. (If no instructions, use regular soap.)  Don't shave or clip hair near your surgery site. We'll remove the hair if needed.  Don't smoke or vape the morning of surgery. You may chew nicotine gum up to 2 hours before surgery. A nicotine patch is okay.  Note: Some surgeries require you to completely quit smoking and nicotine. Check with your surgeon.  Your care team will make every effort to keep you safe from infection. We will:  Clean our hands often with soap and water (or an alcohol-based hand rub).  Clean the skin at your surgery site with a special soap that kills germs.  Give you a special gown to keep you warm. (Cold raises the risk of infection.)  Wear special hair covers, masks, gowns and gloves during surgery.  Give antibiotic medicine, if prescribed. Not all surgeries need antibiotics.  What to bring on the day of surgery  Photo ID and insurance card  Copy of your health care directive, if you have one  Glasses and hearing aids (bring cases)  You can't wear contacts during surgery  Inhaler and eye drops, if you use them (tell us about these when you arrive)  CPAP machine or breathing device, if you use them  A few personal items, if spending the night  If you have . . .  A pacemaker, ICD (cardiac defibrillator) or other  implant: Bring the ID card.  An implanted stimulator: Bring the remote control.  A legal guardian: Bring a copy of the certified (court-stamped) guardianship papers.  Please remove any jewelry, including body piercings. Leave jewelry and other valuables at home.  If you're going home the day of surgery  You must have a responsible adult drive you home. They should stay with you overnight as well.  If you don't have someone to stay with you, and you aren't safe to go home alone, we may keep you overnight. Insurance often won't pay for this.  After surgery  If it's hard to control your pain or you need more pain medicine, please call your surgeon's office.  Questions?   If you have any questions for your care team, list them here: _________________________________________________________________________________________________________________________________________________________________________ ____________________________________ ____________________________________ ____________________________________  For informational purposes only. Not to replace the advice of your health care provider. Copyright   2003, 2019 [employer]. All rights reserved. Clinically reviewed by Janeth Bonilla MD. [employer] 635310 - REV 12/22.    How to Take Your Medication Before Surgery  - Take Wegovy this week on Friday and then resume after your surgery -   - do not take the extra HCTZ on the date of surgery.  Take a 1/2 tab of your valsartan/HCTZ on the morning of your surgery with a sip or water   - Tylenol for pain

## 2024-02-21 ENCOUNTER — TELEPHONE (OUTPATIENT)
Dept: FAMILY MEDICINE | Facility: CLINIC | Age: 54
End: 2024-02-21
Payer: COMMERCIAL

## 2024-02-21 LAB
ANION GAP SERPL CALCULATED.3IONS-SCNC: 8 MMOL/L (ref 7–15)
BUN SERPL-MCNC: 13 MG/DL (ref 6–20)
CALCIUM SERPL-MCNC: 9.2 MG/DL (ref 8.6–10)
CHLORIDE SERPL-SCNC: 103 MMOL/L (ref 98–107)
CREAT SERPL-MCNC: 1.03 MG/DL (ref 0.67–1.17)
DEPRECATED HCO3 PLAS-SCNC: 31 MMOL/L (ref 22–29)
EGFRCR SERPLBLD CKD-EPI 2021: 86 ML/MIN/1.73M2
GLUCOSE SERPL-MCNC: 121 MG/DL (ref 70–99)
POTASSIUM SERPL-SCNC: 3.5 MMOL/L (ref 3.4–5.3)
SODIUM SERPL-SCNC: 142 MMOL/L (ref 135–145)

## 2024-02-21 NOTE — TELEPHONE ENCOUNTER
Prior Authorization Request  Who s requesting:  Pharmacy  Pharmacy Name and Location: [place] [address]  Medication Name: valsartan-hydrochlorothiazide (DIOVAN HCT) 160-25 MG tablet   Insurance Plan: [facility]  Insurance Member ID Number:  [id number]   CoverMyMeds Key: [id number]  Informed patient that prior authorizations can take up to 10 business days for response:   NA  Okay to leave a detailed message: No

## 2024-03-05 NOTE — TELEPHONE ENCOUNTER
PA Initiation    Medication: VALSARTAN-HYDROCHLOROTHIAZIDE 160-25 MG PO TABS  Insurance Company: [email or web address] ([facility]) - Phone [phone] Fax [phone]  Pharmacy Filling the Rx: [id number] [place], [place] - [address]  Filling Pharmacy Phone: [phone]  Filling Pharmacy Fax: [phone]  Start Date: 3/5/2024

## 2024-03-06 ENCOUNTER — PATIENT OUTREACH (OUTPATIENT)
Dept: CARE COORDINATION | Facility: CLINIC | Age: 54
End: 2024-03-06

## 2024-03-06 NOTE — TELEPHONE ENCOUNTER
Prior Authorization Not Needed per Insurance    Medication: VALSARTAN-HYDROCHLOROTHIAZIDE 160-25 MG PO TABS  Insurance Company: [employer] ([facility]) - Phone [phone] Fax [phone]  Expected CoPay: $    Pharmacy Filling the Rx: [id number] - [place], [place] - [address]  Pharmacy Notified: YES  Patient Notified: **Instructed pharmacy to notify patient when script is ready to /ship.**

## 2024-03-16 ENCOUNTER — TRANSFERRED RECORDS (OUTPATIENT)
Dept: HEALTH INFORMATION MANAGEMENT | Facility: CLINIC | Age: 54
End: 2024-03-16
Payer: COMMERCIAL

## 2024-03-21 ENCOUNTER — TRANSFERRED RECORDS (OUTPATIENT)
Dept: HEALTH INFORMATION MANAGEMENT | Facility: CLINIC | Age: 54
End: 2024-03-21
Payer: COMMERCIAL

## 2024-04-15 ENCOUNTER — TELEPHONE (OUTPATIENT)
Dept: FAMILY MEDICINE | Facility: CLINIC | Age: 54
End: 2024-04-15
Payer: COMMERCIAL

## 2024-04-15 NOTE — TELEPHONE ENCOUNTER
Prior Authorization Request  Who s requesting:  Pharmacy  Pharmacy Name and Location: [place] [address]  Medication Name: Semaglutide-Weight Management (WEGOVY) 2.4 MG/0.75ML pen   Insurance Plan: [facility]  Insurance Member ID Number:  [id number]   CoverMyMeds Key: [id number]  Informed patient that prior authorizations can take up to 10 business days for response:   NA  Okay to leave a detailed message: No

## 2024-04-26 NOTE — TELEPHONE ENCOUNTER
Retail Pharmacy Prior Authorization Team   Phone: [phone]    PA Initiation    Medication: WEGOVY 2.4 MG/0.75ML SC SOAJ  Insurance Company: [employer] ([facility]) - Phone [phone] Fax [phone]  Pharmacy Filling the Rx: [facility][id number] [place], [place] - [address]  Filling Pharmacy Phone: [phone]  Filling Pharmacy Fax:    Start Date: 4/26/2024      Note: Due to record-high volumes, our turn-around time is taking longer than usual . We are currently 10 business days behind in the pools.   We are working diligently to submit all requests in a timely manner and in the order they are received. Please only flag TRUE URGENT requests as high priority to the pool at this time.   If you have questions - please send a note/message in the active PA encounter and send back to the [facility] PA pool [[id number]].    If you have more specific questions about our process please reach out to our supervisor Gladys August.   Thank you!   RPPA (Retail Pharmacy Prior Authorization) team

## 2024-04-26 NOTE — TELEPHONE ENCOUNTER
Retail Pharmacy Prior Authorization Team   Phone: [phone]    Prior Authorization Approval    Medication: WEGOVY 2.4 MG/0.75ML SC SOAJ  Authorization Effective Date: 4/26/2024  Authorization Expiration Date: 04/26/2025  Insurance Company: Dom ([place]) - Phone [phone] Fax [phone]  Which Pharmacy is filling the prescription: [id number] [address]  Pharmacy Notified: YES  Patient Notified: YES

## 2024-05-05 ENCOUNTER — HEALTH MAINTENANCE LETTER (OUTPATIENT)
Age: 54
End: 2024-05-05

## 2024-05-08 ENCOUNTER — TRANSFERRED RECORDS (OUTPATIENT)
Dept: HEALTH INFORMATION MANAGEMENT | Facility: CLINIC | Age: 54
End: 2024-05-08
Payer: COMMERCIAL

## 2024-07-11 ENCOUNTER — MYC MEDICAL ADVICE (OUTPATIENT)
Dept: FAMILY MEDICINE | Facility: CLINIC | Age: 54
End: 2024-07-11
Payer: COMMERCIAL

## 2024-07-12 ENCOUNTER — TELEPHONE (OUTPATIENT)
Dept: FAMILY MEDICINE | Facility: CLINIC | Age: 54
End: 2024-07-12
Payer: COMMERCIAL

## 2024-07-12 NOTE — TELEPHONE ENCOUNTER
Prior Authorization Request  Who s requesting:  Patient  Pharmacy Name and Location: [place] [address]  Medication Name: Semaglutide-Weight Management (WEGOVY) 2.4 MG/0.75ML pen   Insurance Plan: [employer]  Insurance Member ID Number:  [id number]   CoverMyMeds Key:   Informed patient that prior authorizations can take up to 10 business days for response:   Yes  Okay to leave a detailed message: Yes    Patient had insurance change 7/1/24.

## 2024-07-17 NOTE — TELEPHONE ENCOUNTER
PA Initiation    Medication: WEGOVY 2.4 MG/0.75ML SC SOAJ  Insurance Company: [employer] - Phone [phone] Fax [phone]  Pharmacy Filling the Rx: [facility] [id number] [place] [address] - [address]  Filling Pharmacy Phone: [phone]  Filling Pharmacy Fax: [phone]  Start Date: 7/17/2024

## 2024-07-19 NOTE — TELEPHONE ENCOUNTER
Prior Authorization Approval    Medication: WEGOVY 2.4 MG/0.75ML SC SOAJ  Authorization Effective Date: 6/19/2024  Authorization Expiration Date: 7/19/2025  Approved Dose/Quantity: as written  Reference #: [id number]   Insurance Company: [employer] - Phone [phone] Fax [phone]  Which Pharmacy is filling the prescription: [id number] [address] [place] [address]  Pharmacy Notified: y  Patient Notified: Instructed pharmacy to notify patient once order is ready.

## 2024-07-19 NOTE — TELEPHONE ENCOUNTER
Spoke with plan, confirmed receipt of request. Case still under review, determination expected by tomorrow.

## 2024-09-03 ENCOUNTER — PATIENT OUTREACH (OUTPATIENT)
Dept: CARE COORDINATION | Facility: CLINIC | Age: 54
End: 2024-09-03
Payer: COMMERCIAL

## 2024-09-04 ENCOUNTER — TRANSFERRED RECORDS (OUTPATIENT)
Dept: HEALTH INFORMATION MANAGEMENT | Facility: CLINIC | Age: 54
End: 2024-09-04
Payer: COMMERCIAL

## 2024-09-24 ENCOUNTER — PATIENT OUTREACH (OUTPATIENT)
Dept: CARE COORDINATION | Facility: CLINIC | Age: 54
End: 2024-09-24
Payer: COMMERCIAL

## 2024-10-27 ENCOUNTER — PATIENT OUTREACH (OUTPATIENT)
Dept: CARE COORDINATION | Facility: CLINIC | Age: 54
End: 2024-10-27
Payer: COMMERCIAL

## 2024-10-31 ENCOUNTER — OFFICE VISIT (OUTPATIENT)
Dept: FAMILY MEDICINE | Facility: CLINIC | Age: 54
End: 2024-10-31
Payer: COMMERCIAL

## 2024-10-31 VITALS
OXYGEN SATURATION: 97 % | HEART RATE: 79 BPM | DIASTOLIC BLOOD PRESSURE: 81 MMHG | HEIGHT: 72 IN | RESPIRATION RATE: 16 BRPM | BODY MASS INDEX: 39.04 KG/M2 | TEMPERATURE: 97.7 F | SYSTOLIC BLOOD PRESSURE: 118 MMHG | WEIGHT: 288.2 LBS

## 2024-10-31 DIAGNOSIS — E66.01 CLASS 2 SEVERE OBESITY DUE TO EXCESS CALORIES WITH SERIOUS COMORBIDITY AND BODY MASS INDEX (BMI) OF 38.0 TO 38.9 IN ADULT (H): ICD-10-CM

## 2024-10-31 DIAGNOSIS — Z12.5 SCREENING FOR PROSTATE CANCER: ICD-10-CM

## 2024-10-31 DIAGNOSIS — Z12.11 SCREEN FOR COLON CANCER: ICD-10-CM

## 2024-10-31 DIAGNOSIS — Z00.00 ENCOUNTER FOR ROUTINE ADULT HEALTH EXAMINATION WITHOUT ABNORMAL FINDINGS: Primary | ICD-10-CM

## 2024-10-31 DIAGNOSIS — I10 BENIGN ESSENTIAL HYPERTENSION: ICD-10-CM

## 2024-10-31 DIAGNOSIS — E66.812 CLASS 2 SEVERE OBESITY DUE TO EXCESS CALORIES WITH SERIOUS COMORBIDITY AND BODY MASS INDEX (BMI) OF 38.0 TO 38.9 IN ADULT (H): ICD-10-CM

## 2024-10-31 DIAGNOSIS — E78.5 DYSLIPIDEMIA: ICD-10-CM

## 2024-10-31 DIAGNOSIS — E88.810 METABOLIC SYNDROME: ICD-10-CM

## 2024-10-31 LAB
EST. AVERAGE GLUCOSE BLD GHB EST-MCNC: 100 MG/DL
HBA1C MFR BLD: 5.1 % (ref 0–5.6)

## 2024-10-31 PROCEDURE — G0103 PSA SCREENING: HCPCS | Performed by: FAMILY MEDICINE

## 2024-10-31 PROCEDURE — 80061 LIPID PANEL: CPT | Performed by: FAMILY MEDICINE

## 2024-10-31 PROCEDURE — 99396 PREV VISIT EST AGE 40-64: CPT | Performed by: FAMILY MEDICINE

## 2024-10-31 PROCEDURE — 36415 COLL VENOUS BLD VENIPUNCTURE: CPT | Performed by: FAMILY MEDICINE

## 2024-10-31 PROCEDURE — 84450 TRANSFERASE (AST) (SGOT): CPT | Performed by: FAMILY MEDICINE

## 2024-10-31 PROCEDURE — 84460 ALANINE AMINO (ALT) (SGPT): CPT | Performed by: FAMILY MEDICINE

## 2024-10-31 PROCEDURE — 99214 OFFICE O/P EST MOD 30 MIN: CPT | Mod: 25 | Performed by: FAMILY MEDICINE

## 2024-10-31 PROCEDURE — 83036 HEMOGLOBIN GLYCOSYLATED A1C: CPT | Performed by: FAMILY MEDICINE

## 2024-10-31 PROCEDURE — 80048 BASIC METABOLIC PNL TOTAL CA: CPT | Performed by: FAMILY MEDICINE

## 2024-10-31 RX ORDER — SEMAGLUTIDE 2.4 MG/.75ML
2.4 INJECTION, SOLUTION SUBCUTANEOUS WEEKLY
Qty: 3 ML | Refills: 11 | Status: SHIPPED | OUTPATIENT
Start: 2024-10-31

## 2024-10-31 RX ORDER — FLUCONAZOLE 200 MG/1
200 TABLET ORAL DAILY
COMMUNITY
Start: 2024-09-04

## 2024-10-31 RX ORDER — VALSARTAN AND HYDROCHLOROTHIAZIDE 160; 25 MG/1; MG/1
1 TABLET ORAL DAILY
Qty: 90 TABLET | Refills: 3 | Status: SHIPPED | OUTPATIENT
Start: 2024-10-31

## 2024-10-31 RX ORDER — HYDROCHLOROTHIAZIDE 12.5 MG/1
1 CAPSULE ORAL DAILY
COMMUNITY
Start: 2024-10-31 | End: 2024-11-11

## 2024-10-31 SDOH — HEALTH STABILITY: PHYSICAL HEALTH: ON AVERAGE, HOW MANY DAYS PER WEEK DO YOU ENGAGE IN MODERATE TO STRENUOUS EXERCISE (LIKE A BRISK WALK)?: 5 DAYS

## 2024-10-31 SDOH — HEALTH STABILITY: PHYSICAL HEALTH: ON AVERAGE, HOW MANY MINUTES DO YOU ENGAGE IN EXERCISE AT THIS LEVEL?: 70 MIN

## 2024-10-31 ASSESSMENT — ENCOUNTER SYMPTOMS
COLOR CHANGE: 0
ABDOMINAL PAIN: 0
CHILLS: 0
PALPITATIONS: 0
DYSURIA: 0
FEVER: 0
BACK PAIN: 0
SEIZURES: 0
VOMITING: 0
SHORTNESS OF BREATH: 0
HEMATURIA: 0
SORE THROAT: 0
ARTHRALGIAS: 0
EYE PAIN: 0
COUGH: 0

## 2024-10-31 ASSESSMENT — SOCIAL DETERMINANTS OF HEALTH (SDOH): HOW OFTEN DO YOU GET TOGETHER WITH FRIENDS OR RELATIVES?: TWICE A WEEK

## 2024-10-31 NOTE — ASSESSMENT & PLAN NOTE
Annual exam.  Doing well.  Due for colonoscopy.  This was ordered today.  Up-to-date with vaccines other than COVID-19.  He had COVID-19 approximately 3 months few months before getting another vaccine.

## 2024-10-31 NOTE — PATIENT INSTRUCTIONS
Patient Education   Preventive Care Advice   This is general advice given by our system to help you stay healthy. However, your care team may have specific advice just for you. Please talk to your care team about your preventive care needs.  Nutrition  Eat 5 or more servings of fruits and vegetables each day.  Try wheat bread, brown rice and whole grain pasta (instead of white bread, rice, and pasta).  Get enough calcium and vitamin D. Check the label on foods and aim for 100% of the RDA (recommended daily allowance).  Lifestyle  Exercise at least 150 minutes each week  (30 minutes a day, 5 days a week).  Do muscle strengthening activities 2 days a week. These help control your weight and prevent disease.  No smoking.  Wear sunscreen to prevent skin cancer.  Have a dental exam and cleaning every 6 months.  Yearly exams  See your health care team every year to talk about:  Any changes in your health.  Any medicines your care team has prescribed.  Preventive care, family planning, and ways to prevent chronic diseases.  Shots (vaccines)   HPV shots (up to age 26), if you've never had them before.  Hepatitis B shots (up to age 59), if you've never had them before.  COVID-19 shot: Get this shot when it's due.  Flu shot: Get a flu shot every year.  Tetanus shot: Get a tetanus shot every 10 years.  Pneumococcal, hepatitis A, and RSV shots: Ask your care team if you need these based on your risk.  Shingles shot (for age 50 and up)  General health tests  Diabetes screening:  Starting at age 35, Get screened for diabetes at least every 3 years.  If you are younger than age 35, ask your care team if you should be screened for diabetes.  Cholesterol test: At age 39, start having a cholesterol test every 5 years, or more often if advised.  Bone density scan (DEXA): At age 50, ask your care team if you should have this scan for osteoporosis (brittle bones).  Hepatitis C: Get tested at least once in your life.  STIs (sexually  transmitted infections)  Before age 24: Ask your care team if you should be screened for STIs.  After age 24: Get screened for STIs if you're at risk. You are at risk for STIs (including HIV) if:  You are sexually active with more than one person.  You don't use condoms every time.  You or a partner was diagnosed with a sexually transmitted infection.  If you are at risk for HIV, ask about PrEP medicine to prevent HIV.  Get tested for HIV at least once in your life, whether you are at risk for HIV or not.  Cancer screening tests  Cervical cancer screening: If you have a cervix, begin getting regular cervical cancer screening tests starting at age 21.  Breast cancer scan (mammogram): If you've ever had breasts, begin having regular mammograms starting at age 40. This is a scan to check for breast cancer.  Colon cancer screening: It is important to start screening for colon cancer at age 45.  Have a colonoscopy test every 10 years (or more often if you're at risk) Or, ask your provider about stool tests like a FIT test every year or Cologuard test every 3 years.  To learn more about your testing options, visit:   .  For help making a decision, visit:   https://bit.ly/ny30599.  Prostate cancer screening test: If you have a prostate, ask your care team if a prostate cancer screening test (PSA) at age 55 is right for you.  Lung cancer screening: If you are a current or former smoker ages 50 to 80, ask your care team if ongoing lung cancer screenings are right for you.  For informational purposes only. Not to replace the advice of your health care provider. Copyright   2023 [employer]. All rights reserved. Clinically reviewed by the [facility] Transitions Program. Tele[employer] [id number] - REV 01/24.

## 2024-10-31 NOTE — ASSESSMENT & PLAN NOTE
54 year old year old male in clinic today to discuss treatment of the following conditions through diet and lifestyle modification and weight loss:  1. Encounter for routine adult health examination without abnormal findings    2. Screen for colon cancer    3. Benign essential hypertension    4. Class 2 severe obesity due to excess calories with serious comorbidity and body mass index (BMI) of 38.0 to 38.9 in adult (H)    5. Dyslipidemia    6. Metabolic syndrome    7. Screening for prostate cancer    8. Class 2 severe obesity due to excess calories with serious comorbidity and body mass index (BMI) of 39.0 to 39.9 in adult (H)      The patient's weight loss result since the last visit was successful based on weight loss and overall improvement in sense of health. He feels good overall.     BMI: Body mass index is 38.82 kg/m .  Ideal body weight: 78.2 kg (172 lb 5.5 oz)  Adjusted ideal body weight: 99.2 kg (218 lb 11 oz)    Current therapies:    Medications: wegovy   - Starting weight for GLP1 receptor agonist: 348 lbs   - Total weight loss: 60 lbs (17%)    Nutritional goals/strategies: reviewed.   - caloric restriction: Yes   - time restriction: NO   - diet (macronutrient) framework: high protein/low carbohydrate     Movement:   - predominantly cardiovascular      Follow-up: 6 months

## 2024-10-31 NOTE — ASSESSMENT & PLAN NOTE
Check lipids.  ASCVD risk score is relatively low.  No family history.  Consider CT coronary calcium scan.  Likely defer for now.

## 2024-10-31 NOTE — PROGRESS NOTES
Preventive Care Visit  [facility] GIAN Jeffrey MD, Family Medicine  Oct 31, 2024      Assessment & Plan     Assessment & Plan  Encounter for routine adult health examination without abnormal findings  Annual exam.  Doing well.  Due for colonoscopy.  This was ordered today.  Up-to-date with vaccines other than COVID-19.  He had COVID-19 approximately 3 months few months before getting another vaccine.  Screen for colon cancer    Benign essential hypertension  BP controlled.  Drop extra HCTZ dose and monitor.  Class 2 severe obesity due to excess calories with serious comorbidity and body mass index (BMI) of 38.0 to 38.9 in adult (H)  54 year old year old male in clinic today to discuss treatment of the following conditions through diet and lifestyle modification and weight loss:  1. Encounter for routine adult health examination without abnormal findings    2. Screen for colon cancer    3. Benign essential hypertension    4. Class 2 severe obesity due to excess calories with serious comorbidity and body mass index (BMI) of 38.0 to 38.9 in adult (H)    5. Dyslipidemia    6. Metabolic syndrome    7. Screening for prostate cancer    8. Class 2 severe obesity due to excess calories with serious comorbidity and body mass index (BMI) of 39.0 to 39.9 in adult (H)      The patient's weight loss result since the last visit was successful based on weight loss and overall improvement in sense of health. He feels good overall.     BMI: Body mass index is 38.82 kg/m .  Ideal body weight: 78.2 kg (172 lb 5.5 oz)  Adjusted ideal body weight: 99.2 kg (218 lb 11 oz)    Current therapies:    Medications: wegovy   - Starting weight for GLP1 receptor agonist: 348 lbs   - Total weight loss: 60 lbs (17%)    Nutritional goals/strategies: reviewed.   - caloric restriction: Yes   - time restriction: NO   - diet (macronutrient) framework: high protein/low carbohydrate     Movement:   - predominantly  "cardiovascular      Follow-up: 6 months    Dyslipidemia  Check lipids.  ASCVD risk score is relatively low.  No family history.  Consider CT coronary calcium scan.  Likely defer for now.  Metabolic syndrome  Continues to immprove.   Screening for prostate cancer           Patient has been advised of split billing requirements and indicates understanding: Yes       BMI  Estimated body mass index is 38.82 kg/m  as calculated from the following:    Height as of this encounter: 1.835 m (6' 0.25\").    Weight as of this encounter: 130.7 kg (288 lb 3.2 oz).   Weight management plan: Specific weight management program called comprehensive weight management discussed    Counseling  Appropriate preventive services were addressed with this patient via screening, questionnaire, or discussion as appropriate for fall prevention, nutrition, physical activity, Tobacco-use cessation, social engagement, weight loss and cognition.  Checklist reviewing preventive services available has been given to the patient.  Reviewed patient's diet, addressing concerns and/or questions.   He is at risk for psychosocial distress and has been provided with information to reduce risk.     Emerald Aiken is a 54 year old, presenting for the following:  Physical (Fasting )        10/31/2024     7:24 AM   Additional Questions   Roomed by xl          Metabolic health:   - back surgery earlier this year was \"huge success.\"  Now able to move better.  Started with walking 3-5 miles every day outside. Planning to continue.  Spped has increased. Goal: start adding weights.     - muscle up.  Weight down.     - he continues to find wegovy to be helpful.  \"Keeping my appetite at bay.\" Minimal side effects.            Health Care Directive  Patient does not have a Health Care Directive: Discussed advance care planning with patient; information given to patient to review.      10/31/2024   General Health   How would you rate your overall physical health? " Good   Feel stress (tense, anxious, or unable to sleep) Only a little      (!) STRESS CONCERN      10/31/2024   Nutrition   Three or more servings of calcium each day? Yes   Diet: Regular (no restrictions)   How many servings of fruit and vegetables per day? (!) 2-3   How many sweetened beverages each day? 0-1            10/31/2024   Exercise   Days per week of moderate/strenous exercise 5 days   Average minutes spent exercising at this level 70 min            10/31/2024   Social Factors   Frequency of gathering with friends or relatives Twice a week   Worry food won't last until get money to buy more No   Food not last or not have enough money for food? No   Do you have housing? (Housing is defined as stable permanent housing and does not include staying ouside in a car, in a tent, in an abandoned building, in an overnight shelter, or couch-surfing.) Yes   Are you worried about losing your housing? No   Lack of transportation? No   Unable to get utilities (heat,electricity)? No            10/31/2024   Fall Risk   Fallen 2 or more times in the past year? No    Trouble with walking or balance? No        Patient-reported          10/31/2024   Dental   Dentist two times every year? Yes            10/31/2024   TB Screening   Were you born outside of the [place]? No              Today's PHQ-2 Score:       2/19/2024    12:27 PM   PHQ-2 ( 1999 [employer])   Q1: Little interest or pleasure in doing things 0    Q2: Feeling down, depressed or hopeless 0    PHQ-2 Score 0   Q1: Little interest or pleasure in doing things Not at all   Q2: Feeling down, depressed or hopeless Not at all   PHQ-2 Score 0       Patient-reported         10/31/2024   Substance Use   Alcohol more than 3/day or more than 7/wk Not Applicable   Do you use any other substances recreationally? No        Social History     Tobacco Use     Smoking status: Never     Smokeless tobacco: Never   Vaping Use     Vaping status: Never Used   Substance Use Topics     Alcohol  use: No     Drug use: No           10/31/2024   STI Screening   New sexual partner(s) since last STI/HIV test? No      Last PSA:   Prostate Specific Antigen Screen   Date Value Ref Range Status   03/28/2023 0.26 0.00 - 3.50 ng/mL Final     ASCVD Risk   The 10-year ASCVD risk score (Yolanda JUDD, et al., 2019) is: 5.7%    Values used to calculate the score:      Age: 54 years      Sex: Male      Is Non- : No      Diabetic: No      Tobacco smoker: No      Systolic Blood Pressure: 118 mmHg      Is BP treated: Yes      HDL Cholesterol: 33 mg/dL      Total Cholesterol: 147 mg/dL         Reviewed and updated as needed this visit by Provider                    Patient Active Problem List   Diagnosis     Prediabetes     Class 2 severe obesity due to excess calories with serious comorbidity and body mass index (BMI) of 38.0 to 38.9 in adult (H)     Benign essential hypertension     Snores     Metabolic syndrome     Avitaminosis D     Swelling     Peroneal tendonitis of right lower extremity     Family history of colon cancer     Encounter for routine adult health examination without abnormal findings     Dyslipidemia     Right lumbar radiculopathy     Past Surgical History:   Procedure Laterality Date     BACK SURGERY       COLONOSCOPY N/A 11/26/2019    Procedure: COLONOSCOPY;  Surgeon: Twin Jaramillo MD;  Location: [facility];  Service: General     KNEE SURGERY         Social History     Tobacco Use     Smoking status: Never     Smokeless tobacco: Never   Substance Use Topics     Alcohol use: No     Family History   Problem Relation Age of Onset     Hypertension Mother      Breast Cancer Mother      Bipolar Disorder Father      Mental Illness Father         Bi Polar Disorder     No Known Problems Sister      Diabetes Maternal Grandmother      Colon Cancer Maternal Grandmother      Colon Cancer Paternal Grandfather      No Known Problems Son      No Known Problems Son      Heart  "Disease No family hx of      Prostate Cancer No family hx of      Coronary Artery Disease No family hx of          Review of Systems   Constitutional:  Negative for chills and fever.   HENT:  Negative for ear pain and sore throat.    Eyes:  Negative for pain and visual disturbance.   Respiratory:  Negative for cough and shortness of breath.    Cardiovascular:  Negative for chest pain and palpitations.   Gastrointestinal:  Negative for abdominal pain and vomiting.   Genitourinary:  Negative for dysuria and hematuria.   Musculoskeletal:  Negative for arthralgias and back pain.   Skin:  Negative for color change and rash.   Neurological:  Negative for seizures and syncope.   All other systems reviewed and are negative.         Objective    Exam  /81 (BP Location: Left arm, Patient Position: Sitting, Cuff Size: Adult Large)   Pulse 79   Temp 97.7  F (36.5  C) (Oral)   Resp 16   Ht 1.835 m (6' 0.25\")   Wt 130.7 kg (288 lb 3.2 oz)   SpO2 97%   BMI 38.82 kg/m     Estimated body mass index is 38.82 kg/m  as calculated from the following:    Height as of this encounter: 1.835 m (6' 0.25\").    Weight as of this encounter: 130.7 kg (288 lb 3.2 oz).    Physical Exam  Vitals reviewed.   Constitutional:       General: He is not in acute distress.     Appearance: Normal appearance.   HENT:      Head: Normocephalic and atraumatic.      Right Ear: External ear normal.      Left Ear: External ear normal.      Nose: Nose normal.      Mouth/Throat:      Pharynx: No oropharyngeal exudate or posterior oropharyngeal erythema.   Eyes:      General: No scleral icterus.  Cardiovascular:      Rate and Rhythm: Normal rate and regular rhythm.      Heart sounds: Normal heart sounds. No murmur heard.     No friction rub. No gallop.   Pulmonary:      Effort: Pulmonary effort is normal. No respiratory distress.      Breath sounds: No wheezing.   Abdominal:      General: Bowel sounds are normal. There is no distension.      Palpations: " Abdomen is soft. There is no mass.      Tenderness: There is no abdominal tenderness.   Musculoskeletal:         General: No swelling. Normal range of motion.      Cervical back: Normal range of motion.   Skin:     Findings: No rash.   Neurological:      General: No focal deficit present.      Mental Status: He is alert and oriented to person, place, and time.      Cranial Nerves: No cranial nerve deficit.      Deep Tendon Reflexes: Reflexes normal.   Psychiatric:         Mood and Affect: Mood normal.         Behavior: Behavior normal.         Thought Content: Thought content normal.         Judgment: Judgment normal.       Signed Electronically by: Johnson Jeffrey MD

## 2024-11-01 LAB
ALT SERPL W P-5'-P-CCNC: 19 U/L (ref 0–70)
ANION GAP SERPL CALCULATED.3IONS-SCNC: 11 MMOL/L (ref 7–15)
AST SERPL W P-5'-P-CCNC: 24 U/L (ref 0–45)
BUN SERPL-MCNC: 16.7 MG/DL (ref 6–20)
CALCIUM SERPL-MCNC: 9.8 MG/DL (ref 8.8–10.4)
CHLORIDE SERPL-SCNC: 106 MMOL/L (ref 98–107)
CHOLEST SERPL-MCNC: 159 MG/DL
CREAT SERPL-MCNC: 0.89 MG/DL (ref 0.67–1.17)
EGFRCR SERPLBLD CKD-EPI 2021: >90 ML/MIN/1.73M2
FASTING STATUS PATIENT QL REPORTED: YES
FASTING STATUS PATIENT QL REPORTED: YES
GLUCOSE SERPL-MCNC: 79 MG/DL (ref 70–99)
HCO3 SERPL-SCNC: 27 MMOL/L (ref 22–29)
HDLC SERPL-MCNC: 37 MG/DL
LDLC SERPL CALC-MCNC: 96 MG/DL
NONHDLC SERPL-MCNC: 122 MG/DL
POTASSIUM SERPL-SCNC: 3.8 MMOL/L (ref 3.4–5.3)
PSA SERPL DL<=0.01 NG/ML-MCNC: 0.38 NG/ML (ref 0–3.5)
SODIUM SERPL-SCNC: 144 MMOL/L (ref 135–145)
TRIGL SERPL-MCNC: 129 MG/DL

## 2024-11-05 ENCOUNTER — HOSPITAL ENCOUNTER (OUTPATIENT)
Facility: AMBULATORY SURGERY CENTER | Age: 54
End: 2024-11-05
Attending: SURGERY
Payer: COMMERCIAL

## 2024-11-05 ENCOUNTER — TELEPHONE (OUTPATIENT)
Dept: GASTROENTEROLOGY | Facility: CLINIC | Age: 54
End: 2024-11-05
Payer: COMMERCIAL

## 2024-11-05 NOTE — TELEPHONE ENCOUNTER
"Endoscopy Scheduling Screen    Have you had any respiratory illness or flu-like symptoms in the last 10 days?  No    What is your communication preference for Instructions and/or Bowel Prep?   MyChart    What insurance is in the chart?  Other:  BCBS    Ordering/Referring Provider: Johnson Jeffrey MD in [facility] FAMILY [facility]/OB   (If ordering provider performs procedure, schedule with ordering provider unless otherwise instructed. )    BMI: Estimated body mass index is 38.82 kg/m  as calculated from the following:    Height as of 10/31/24: 1.835 m (6' 0.25\").    Weight as of 10/31/24: 130.7 kg (288 lb 3.2 oz).     Sedation Ordered  moderate sedation.   If patient BMI > 50 do not schedule in [facility].    If patient BMI > 45 do not schedule at [facility].    Are you taking methadone or Suboxone?  NO, No RN review required.    Have you been diagnosed and are being treated for severe PTSD or severe anxiety?  NO, No RN review required.    Are you taking any prescription medications for pain 3 or more times per week?   NO, No RN review required.    Do you have a history of malignant hyperthermia?  No    (Females) Are you currently pregnant?   No     Have you been diagnosed or told you have pulmonary hypertension?   No    Do you have an LVAD?  No    Have you been told you have moderate to severe sleep apnea?  No.    Have you been told you have COPD, asthma, or any other lung disease?  No    Do you have any heart conditions?  No     Have you ever had or are you waiting for an organ transplant?  No. Continue scheduling, no site restrictions.    Have you had a stroke or transient ischemic attack (TIA aka \"mini stroke\" in the last 6 months?   No    Have you been diagnosed with or been told you have cirrhosis of the liver?   No.    Are you currently on dialysis?   No    Do you need assistance transferring?   No    BMI: Estimated body mass index is 38.82 kg/m  as calculated from the following:    Height as of 10/31/24: 1.835 m (6' " "0.25\").    Weight as of 10/31/24: 130.7 kg (288 lb 3.2 oz).     Is patients BMI > 40 and scheduling location [facility]?  No    Do you take an injectable or oral medication for weight loss or diabetes (excluding insulin)?  Yes, hold time can be up to 7 days. Please consult with you prescribing provider to discuss endoscopy recommendations. (Please schedule at least 7 days out.)    Do you take the medication Naltrexone?  No    Do you take blood thinners?  No       Prep   Are you currently on dialysis or do you have chronic kidney disease?  No    Do you have a diagnosis of diabetes?  No    Do you have a diagnosis of cystic fibrosis (CF)?  No    On a regular basis do you go 3 -5 days between bowel movements?  No    BMI > 40?  No    Preferred Pharmacy:    [id number] - [place], [place] - [address]  [address] [address]  Phone: [phone] Fax: [phone]      Final Scheduling Details     Procedure scheduled  Colonoscopy    Surgeon:  Ella     Date of procedure:  1.14.25     Pre-OP / PAC:   No - Not required for this site.    Location  [facility] - Patient preference.    Sedation   MAC/Deep Sedation  Location      Patient Reminders:   You will receive a call from a Nurse to review instructions and health history.  This assessment must be completed prior to your procedure.  Failure to complete the Nurse assessment may result in the procedure being cancelled.      On the day of your procedure, please designate an adult(s) who can drive you home stay with you for the next 24 hours. The medicines used in the exam will make you sleepy. You will not be able to drive.      You cannot take public transportation, ride share services, or non-medical taxi service without a responsible caregiver.  Medical transport services are allowed with the requirement that a responsible caregiver will receive you at your destination.  We require that drivers and caregivers are confirmed prior to your procedure.    "

## 2024-11-11 ENCOUNTER — MYC REFILL (OUTPATIENT)
Dept: FAMILY MEDICINE | Facility: CLINIC | Age: 54
End: 2024-11-11
Payer: COMMERCIAL

## 2024-11-11 DIAGNOSIS — I10 BENIGN ESSENTIAL HYPERTENSION: ICD-10-CM

## 2024-11-11 RX ORDER — HYDROCHLOROTHIAZIDE 12.5 MG/1
1 CAPSULE ORAL DAILY
Qty: 90 CAPSULE | Refills: 3 | Status: SHIPPED | OUTPATIENT
Start: 2024-11-11

## 2025-01-05 ENCOUNTER — TRANSFERRED RECORDS (OUTPATIENT)
Dept: HEALTH INFORMATION MANAGEMENT | Facility: CLINIC | Age: 55
End: 2025-01-05
Payer: COMMERCIAL

## 2025-01-05 RX ORDER — LIDOCAINE 40 MG/G
CREAM TOPICAL
OUTPATIENT
Start: 2025-01-05

## 2025-01-05 RX ORDER — ONDANSETRON 2 MG/ML
4 INJECTION INTRAMUSCULAR; INTRAVENOUS
OUTPATIENT
Start: 2025-01-05

## 2025-01-06 ENCOUNTER — TELEPHONE (OUTPATIENT)
Dept: GASTROENTEROLOGY | Facility: CLINIC | Age: 55
End: 2025-01-06
Payer: COMMERCIAL

## 2025-01-06 ENCOUNTER — HOSPITAL ENCOUNTER (OUTPATIENT)
Facility: AMBULATORY SURGERY CENTER | Age: 55
End: 2025-01-06
Attending: SURGERY
Payer: COMMERCIAL

## 2025-01-06 NOTE — TELEPHONE ENCOUNTER
Rescheduled: Yes,   Procedure: Lower Endoscopy [Colonoscopy]    Date: 03/26/2025   Location: [facility]; [facility], [address], [place] [address]   Surgeon: ISMA   Sedation Level Scheduled  MAC ,  Reason for Sedation Level PER LOCATION   Instructions updated and sent: VIA SADAR [id number]     Does patient need PAC or Pre -Op Rescheduled? : NO       Did you cancel or rescheduled an EUS procedure? No.

## 2025-01-10 PROBLEM — I49.8 PERIODIC HEART FLUTTER: Status: ACTIVE | Noted: 2025-01-10

## 2025-01-30 ENCOUNTER — TELEPHONE (OUTPATIENT)
Dept: CARDIOLOGY | Facility: CLINIC | Age: 55
End: 2025-01-30

## 2025-01-30 ENCOUNTER — OFFICE VISIT (OUTPATIENT)
Dept: CARDIOLOGY | Facility: CLINIC | Age: 55
End: 2025-01-30
Attending: FAMILY MEDICINE
Payer: COMMERCIAL

## 2025-01-30 VITALS
SYSTOLIC BLOOD PRESSURE: 130 MMHG | BODY MASS INDEX: 39.76 KG/M2 | HEIGHT: 73 IN | HEART RATE: 83 BPM | WEIGHT: 300 LBS | DIASTOLIC BLOOD PRESSURE: 80 MMHG | RESPIRATION RATE: 17 BRPM

## 2025-01-30 DIAGNOSIS — R00.2 PALPITATIONS: Primary | ICD-10-CM

## 2025-01-30 DIAGNOSIS — R07.2 PRECORDIAL PAIN: ICD-10-CM

## 2025-01-30 DIAGNOSIS — I49.8 PERIODIC HEART FLUTTER: ICD-10-CM

## 2025-01-30 NOTE — LETTER
1/30/2025    Johnson Jeffrey MD  [address] [facility] [address]    RE: Attila Rocha       Dear Colleague,     I had the pleasure of seeing Attila Rocha in the [facility].        Thank you, Dr. Jeffrey, for asking [facility] to evaluate Mr. Attila Rocha.      Assessment/Recommendations   Assessment:    Heart palpitations likely due to isolated PVCs and PACs, symptomatically improved  Hypertension controlled  Obesity    Plan:  We discussed significance of isolated ectopies.  Will perform echocardiogram to rule out structural heart abnormalities I will also perform coronary calcium scan to assess the risk of ischemic events.    Treatment of ectopies is optional with its low frequency.    We discussed lifestyle modification that can help with the reduction of ectopies.    Further recommendations when the results of the test available  The longitudinal plan of care for the diagnosis(es)/condition(s) as documented were addressed during this visit. Due to the added complexity in care, I will continue to support Attila in the subsequent management and with ongoing continuity of care.      History of Present Illness    Mr. Attila Rocha is a 54 year old male who comes in for cardiac evaluation.  Earlier this year he started to experience sensation of heart fluttering.  He did not have any chest pain or shortness of breath associated with it.  He denies any heart racing or syncope.  He went to the emergency room where workup was negative.  He subsequently underwent Zio patch.  He reports that heart palpitations have improved spontaneously.  He denies any recent changes to his medications.  He denies any increased stress in his life.  He has not history of known heart disease.  He had negative stress test 15 years ago or so when he was living in [place].      ECG: Personally reviewed.  1/21/2025 normal sinus rhythm within normal limits.    Zio patch: January  "2025  o monitoring from 1/10/2025 to 1/17/2025 (duration 6d 16h)  Predominant rhythm was sinus rhythm, 44 to 142bpm, average 79bpm.  12 episode(s) of nonsustained supraventricular tachycardia - longest 8 beats, fastest 143bpm.  No sustained tachyarrhythmias.  No atrial fibrillation.  There were no pauses of greater than 3 seconds.  Occasional supraventricular ectopic beats (isolated 4.9%).  Rare premature ventricular contractions (isolated <1%).  Symptom triggers and diary entries (12) correlated to sinus rhythm with and without PACs, PVCs.     Physical Examination Review of Systems   /80 (BP Location: Right arm, Patient Position: Sitting, Cuff Size: Adult Large)   Pulse 83   Resp 17   Ht 1.854 m (6' 1\")   Wt 136.1 kg (300 lb)   BMI 39.58 kg/m    Body mass index is 39.58 kg/m .  Wt Readings from Last 3 Encounters:   01/30/25 136.1 kg (300 lb)   01/10/25 132.5 kg (292 lb)   10/31/24 130.7 kg (288 lb 3.2 oz)     General Appearance:   Alert, cooperative, no distress, appears stated age   Head/ENT: Normocephalic, without obvious abnormality. Membranes moist      EYES:  no scleral icterus, normal conjunctivae   Neck: Supple, symmetrical, trachea midline, no adenopathy, thyroid: not enlarged, symmetric, no carotid bruit or JVD   Chest/Lungs:   Lungs are clear to auscultation, respirations unlabored. No tenderness or deformity    Cardiovascular:   Regular rhythm, S1, S2 normal, no murmur, rub or gallop.   Abdomen:  Soft, non-tender, bowel sounds active all four quadrants,  no masses, no organomegaly   Extremities: no cyanosis or clubbing. No edema   Skin: Skin color, texture, turgor normal, no rashes or lesions.    Psychiatric: Normal affect, calm   Neurologic: Alert and oriented x 3, moving all four extremities.     Encounter Vitals  BP: 130/80  Pulse: 83  Resp: 17  Weight: 136.1 kg (300 lb) (With shoes.)  Height: 185.4 cm (6' 1\")                                          Lab Results    Chemistry/lipid CBC " "Cardiac Enzymes/BNP/TSH/INR   Recent Labs   Lab Test 10/31/24  0817   CHOL 159   HDL 37*   LDL 96   TRIG 129     Recent Labs   Lab Test 10/31/24  0817 03/28/23  0856 03/18/22  0841   LDL 96 87 92     Recent Labs   Lab Test 10/31/24  0817      POTASSIUM 3.8   CHLORIDE 106   CO2 27   GLC 79   BUN 16.7   CR 0.89   GFRESTIMATED >90   NIDA 9.8     Recent Labs   Lab Test 10/31/24  0817 02/20/24  0831 03/28/23  0856   CR 0.89 1.03 0.95     Recent Labs   Lab Test 10/31/24  0817 02/20/24  0831 03/28/23  0856   A1C 5.1 5.3 5.4          Recent Labs   Lab Test 02/20/24  0831   WBC 6.1   HGB 15.6   HCT 46.4   MCV 87        Recent Labs   Lab Test 02/20/24  0831 01/26/21  0735 01/25/21  0641   HGB 15.6 13.9* 12.7*    Recent Labs   Lab Test 01/25/21  0641 01/23/21  0722 01/22/21  0352   TROPONINI <0.01 <0.01 0.02     No results for input(s): \"BNP\", \"NTBNPI\", \"NTBNP\" in the last 71207 hours.  Recent Labs   Lab Test 10/04/18  0803   TSH 1.90     Recent Labs   Lab Test 01/26/21  0735 01/25/21  0641 01/24/21  0723   INR 1.03 1.02 1.00        Medical History  Surgical History Family History Social History   Past Medical History:   Diagnosis Date     Anxiety      COVID-19 01/22/2021     Hypertension      Sleep apnea      Past Surgical History:   Procedure Laterality Date     BACK SURGERY       COLONOSCOPY N/A 11/26/2019    Procedure: COLONOSCOPY;  Surgeon: Twin Jaramillo MD;  Location: [facility];  Service: General     KNEE SURGERY       No premature CAD, SCD,cardiomyopathy   Social History     Socioeconomic History     Marital status:      Spouse name: Not on file     Number of children: Not on file     Years of education: Not on file     Highest education level: Not on file   Occupational History     Not on file   Tobacco Use     Smoking status: Never     Passive exposure: Never     Smokeless tobacco: Never   Vaping Use     Vaping status: Never Used   Substance and Sexual Activity     Alcohol use: No     " Drug use: No     Sexual activity: Yes     Partners: Female     Birth control/protection: Male Surgical     Comment: I had vasectomy in 2003   Other Topics Concern     Parent/sibling w/ CABG, MI or angioplasty before 65F 55M? No   Social History Narrative    10/4/2019  at [employer] [facility] just [place] on [address].      Social Drivers of Health     Financial Resource Strain: Low Risk  (10/31/2024)    Financial Resource Strain      Within the past 12 months, have you or your family members you live with been unable to get utilities (heat, electricity) when it was really needed?: No   Food Insecurity: Low Risk  (10/31/2024)    Food Insecurity      Within the past 12 months, did you worry that your food would run out before you got money to buy more?: No      Within the past 12 months, did the food you bought just not last and you didn t have money to get more?: No   Transportation Needs: Low Risk  (10/31/2024)    Transportation Needs      Within the past 12 months, has lack of transportation kept you from medical appointments, getting your medicines, non-medical meetings or appointments, work, or from getting things that you need?: No   Physical Activity: Sufficiently Active (10/31/2024)    Exercise Vital Sign      Days of Exercise per Week: 5 days      Minutes of Exercise per Session: 70 min   Stress: No Stress Concern Present (10/31/2024)    [employer] - Occupational Stress Questionnaire      Feeling of Stress : Only a little   Social Connections: Unknown (10/31/2024)    Social Connection and Isolation Panel [NHANES]      Frequency of Communication with Friends and Family: Not on file      Frequency of Social Gatherings with Friends and Family: Twice a week      Attends Quaker Services: Not on file      Active Member of Clubs or Organizations: Not on file      Attends Club or Organization Meetings: Not on file      Marital Status: Not on file   Interpersonal Safety: Not At Risk  (1/5/2025)    Received from [facility]    Humiliation, Afraid, Rape, and Kick questionnaire      Fear of Current or Ex-Partner: No      Emotionally Abused: No      Physically Abused: No      Sexually Abused: No   Housing Stability: Low Risk  (10/31/2024)    Housing Stability      Do you have housing? : Yes      Are you worried about losing your housing?: No         Medications  Allergies   Current Outpatient Medications   Medication Sig Dispense Refill     Cholecalciferol (VITAMIN D-3 PO)        fluconazole (DIFLUCAN) 200 MG tablet Take 200 mg by mouth once a week.       loratadine (CLARITIN) 10 MG tablet Take 10 mg by mouth daily.       Semaglutide-Weight Management (WEGOVY) 2.4 MG/0.75ML pen Inject 2.4 mg subcutaneously once a week. 3 mL 11     valsartan-hydrochlorothiazide (DIOVAN HCT) 160-25 MG tablet Take 1 tablet by mouth daily. 90 tablet 3     bisacodyl (DULCOLAX) 5 MG EC tablet Two days prior to exam take two (2) tablets at 4pm. One day prior to exam take two (2) tablets at 4pm (Patient not taking: Reported on 1/30/2025) 4 tablet 0     hydrochlorothiazide (MICROZIDE) 12.5 MG capsule Take 1 capsule (12.5 mg) by mouth daily. (Take as needed for swelling) (Patient not taking: Reported on 1/30/2025) 90 capsule 3     polyethylene glycol (GOLYTELY) 236 g suspension Two days before procedure at 5PM fill first container with water. Mix and drink an 8 oz glass every 15 minutes until HALF of the container is gone. Place the remainder in the refrigerator. One day before procedure at 5PM drink second half of bowel prep. Drink an 8 oz glass every 15 minutes until it is gone. Day of procedure 6 hours before arrival time fill the 2nd container with water. Mix and drink an 8 oz glass every 15 minutes until HALF of the container is gone. Discard the remaining solution. (Patient not taking: Reported on 1/30/2025) 8000 mL 0        Allergies   Allergen Reactions     Penicillins Other (See Comments) and Rash     Rash                            Thank you for allowing me to participate in the care of your patient.      Sincerely,     Abelino Welch MD     [facility]  cc:   Johnson Jeffrey MD  [address] [place] [address]

## 2025-01-30 NOTE — PATIENT INSTRUCTIONS
Attila Rocha,    It was a pleasure to see you today at [facility].     My recommendations after this visit include:    Echo and CT calcium score    ORTEGA Welch MD, FACC, LETTY

## 2025-01-30 NOTE — PROGRESS NOTES
Thank you, Dr. Jeffrey, for asking [facility] to evaluate Mr. Attila Rocha.      Assessment/Recommendations   Assessment:    Heart palpitations likely due to isolated PVCs and PACs, symptomatically improved  Hypertension controlled  Obesity    Plan:  We discussed significance of isolated ectopies.  Will perform echocardiogram to rule out structural heart abnormalities I will also perform coronary calcium scan to assess the risk of ischemic events.    Treatment of ectopies is optional with its low frequency.    We discussed lifestyle modification that can help with the reduction of ectopies.    Further recommendations when the results of the test available  The longitudinal plan of care for the diagnosis(es)/condition(s) as documented were addressed during this visit. Due to the added complexity in care, I will continue to support Attila in the subsequent management and with ongoing continuity of care.      History of Present Illness    Mr. Attila Rocha is a 54 year old male who comes in for cardiac evaluation.  Earlier this year he started to experience sensation of heart fluttering.  He did not have any chest pain or shortness of breath associated with it.  He denies any heart racing or syncope.  He went to the emergency room where workup was negative.  He subsequently underwent Zio patch.  He reports that heart palpitations have improved spontaneously.  He denies any recent changes to his medications.  He denies any increased stress in his life.  He has not history of known heart disease.  He had negative stress test 15 years ago or so when he was living in [place].      ECG: Personally reviewed.  1/21/2025 normal sinus rhythm within normal limits.    Zio patch: January 2025  Zio monitoring from 1/10/2025 to 1/17/2025 (duration 6d 16h)  Predominant rhythm was sinus rhythm, 44 to 142bpm, average 79bpm.  12 episode(s) of nonsustained supraventricular tachycardia - longest 8 beats,  "fastest 143bpm.  No sustained tachyarrhythmias.  No atrial fibrillation.  There were no pauses of greater than 3 seconds.  Occasional supraventricular ectopic beats (isolated 4.9%).  Rare premature ventricular contractions (isolated <1%).  Symptom triggers and diary entries (12) correlated to sinus rhythm with and without PACs, PVCs.     Physical Examination Review of Systems   /80 (BP Location: Right arm, Patient Position: Sitting, Cuff Size: Adult Large)   Pulse 83   Resp 17   Ht 1.854 m (6' 1\")   Wt 136.1 kg (300 lb)   BMI 39.58 kg/m    Body mass index is 39.58 kg/m .  Wt Readings from Last 3 Encounters:   01/30/25 136.1 kg (300 lb)   01/10/25 132.5 kg (292 lb)   10/31/24 130.7 kg (288 lb 3.2 oz)     General Appearance:   Alert, cooperative, no distress, appears stated age   Head/ENT: Normocephalic, without obvious abnormality. Membranes moist      EYES:  no scleral icterus, normal conjunctivae   Neck: Supple, symmetrical, trachea midline, no adenopathy, thyroid: not enlarged, symmetric, no carotid bruit or JVD   Chest/Lungs:   Lungs are clear to auscultation, respirations unlabored. No tenderness or deformity    Cardiovascular:   Regular rhythm, S1, S2 normal, no murmur, rub or gallop.   Abdomen:  Soft, non-tender, bowel sounds active all four quadrants,  no masses, no organomegaly   Extremities: no cyanosis or clubbing. No edema   Skin: Skin color, texture, turgor normal, no rashes or lesions.    Psychiatric: Normal affect, calm   Neurologic: Alert and oriented x 3, moving all four extremities.     Encounter Vitals  BP: 130/80  Pulse: 83  Resp: 17  Weight: 136.1 kg (300 lb) (With shoes.)  Height: 185.4 cm (6' 1\")                                          Lab Results    Chemistry/lipid CBC Cardiac Enzymes/BNP/TSH/INR   Recent Labs   Lab Test 10/31/24  0817   CHOL 159   HDL 37*   LDL 96   TRIG 129     Recent Labs   Lab Test 10/31/24  0817 03/28/23  0856 03/18/22  0841   LDL 96 87 92     Recent Labs " "  Lab Test 10/31/24  0817      POTASSIUM 3.8   CHLORIDE 106   CO2 27   GLC 79   BUN 16.7   CR 0.89   GFRESTIMATED >90   NIDA 9.8     Recent Labs   Lab Test 10/31/24  0817 02/20/24  0831 03/28/23  0856   CR 0.89 1.03 0.95     Recent Labs   Lab Test 10/31/24  0817 02/20/24  0831 03/28/23  0856   A1C 5.1 5.3 5.4          Recent Labs   Lab Test 02/20/24  0831   WBC 6.1   HGB 15.6   HCT 46.4   MCV 87        Recent Labs   Lab Test 02/20/24  0831 01/26/21  0735 01/25/21  0641   HGB 15.6 13.9* 12.7*    Recent Labs   Lab Test 01/25/21  0641 01/23/21  0722 01/22/21  0352   TROPONINI <0.01 <0.01 0.02     No results for input(s): \"BNP\", \"NTBNPI\", \"NTBNP\" in the last 15923 hours.  Recent Labs   Lab Test 10/04/18  0803   TSH 1.90     Recent Labs   Lab Test 01/26/21  0735 01/25/21  0641 01/24/21  0723   INR 1.03 1.02 1.00        Medical History  Surgical History Family History Social History   Past Medical History:   Diagnosis Date    Anxiety     COVID-19 01/22/2021    Hypertension     Sleep apnea      Past Surgical History:   Procedure Laterality Date    BACK SURGERY      COLONOSCOPY N/A 11/26/2019    Procedure: COLONOSCOPY;  Surgeon: Twin Jaramillo MD;  Location: [facility];  Service: General    KNEE SURGERY       No premature CAD, SCD,cardiomyopathy   Social History     Socioeconomic History    Marital status:      Spouse name: Not on file    Number of children: Not on file    Years of education: Not on file    Highest education level: Not on file   Occupational History    Not on file   Tobacco Use    Smoking status: Never     Passive exposure: Never    Smokeless tobacco: Never   Vaping Use    Vaping status: Never Used   Substance and Sexual Activity    Alcohol use: No    Drug use: No    Sexual activity: Yes     Partners: Female     Birth control/protection: Male Surgical     Comment: I had vasectomy in 2003   Other Topics Concern    Parent/sibling w/ CABG, MI or angioplasty before 65F 55M? No "   Social History Narrative    10/4/2019  at [facility] [place] on [address].      Social Drivers of Health     Financial Resource Strain: Low Risk  (10/31/2024)    Financial Resource Strain     Within the past 12 months, have you or your family members you live with been unable to get utilities (heat, electricity) when it was really needed?: No   Food Insecurity: Low Risk  (10/31/2024)    Food Insecurity     Within the past 12 months, did you worry that your food would run out before you got money to buy more?: No     Within the past 12 months, did the food you bought just not last and you didn t have money to get more?: No   Transportation Needs: Low Risk  (10/31/2024)    Transportation Needs     Within the past 12 months, has lack of transportation kept you from medical appointments, getting your medicines, non-medical meetings or appointments, work, or from getting things that you need?: No   Physical Activity: Sufficiently Active (10/31/2024)    Exercise Vital Sign     Days of Exercise per Week: 5 days     Minutes of Exercise per Session: 70 min   Stress: No Stress Concern Present (10/31/2024)    [employer] [place] [employer] - Occupational Stress Questionnaire     Feeling of Stress : Only a little   Social Connections: Unknown (10/31/2024)    Social Connection and Isolation Panel [NHANES]     Frequency of Communication with Friends and Family: Not on file     Frequency of Social Gatherings with Friends and Family: Twice a week     Attends Restorationism Services: Not on file     Active Member of Clubs or Organizations: Not on file     Attends Club or Organization Meetings: Not on file     Marital Status: Not on file   Interpersonal Safety: Not At Risk (1/5/2025)    Received from [facility]    Humiliation, Afraid, Rape, and Kick questionnaire     Fear of Current or Ex-Partner: No     Emotionally Abused: No     Physically Abused: No     Sexually Abused: No   Housing Stability: Low Risk   (10/31/2024)    Housing Stability     Do you have housing? : Yes     Are you worried about losing your housing?: No         Medications  Allergies   Current Outpatient Medications   Medication Sig Dispense Refill    Cholecalciferol (VITAMIN D-3 PO)       fluconazole (DIFLUCAN) 200 MG tablet Take 200 mg by mouth once a week.      loratadine (CLARITIN) 10 MG tablet Take 10 mg by mouth daily.      Semaglutide-Weight Management (WEGOVY) 2.4 MG/0.75ML pen Inject 2.4 mg subcutaneously once a week. 3 mL 11    valsartan-hydrochlorothiazide (DIOVAN HCT) 160-25 MG tablet Take 1 tablet by mouth daily. 90 tablet 3    bisacodyl (DULCOLAX) 5 MG EC tablet Two days prior to exam take two (2) tablets at 4pm. One day prior to exam take two (2) tablets at 4pm (Patient not taking: Reported on 1/30/2025) 4 tablet 0    hydrochlorothiazide (MICROZIDE) 12.5 MG capsule Take 1 capsule (12.5 mg) by mouth daily. (Take as needed for swelling) (Patient not taking: Reported on 1/30/2025) 90 capsule 3    polyethylene glycol (GOLYTELY) 236 g suspension Two days before procedure at 5PM fill first container with water. Mix and drink an 8 oz glass every 15 minutes until HALF of the container is gone. Place the remainder in the refrigerator. One day before procedure at 5PM drink second half of bowel prep. Drink an 8 oz glass every 15 minutes until it is gone. Day of procedure 6 hours before arrival time fill the 2nd container with water. Mix and drink an 8 oz glass every 15 minutes until HALF of the container is gone. Discard the remaining solution. (Patient not taking: Reported on 1/30/2025) 8000 mL 0        Allergies   Allergen Reactions    Penicillins Other (See Comments) and Rash     Rash

## 2025-01-30 NOTE — TELEPHONE ENCOUNTER
[facility]    Phone Message    May a detailed message be left on voicemail: yes     Reason for Call: Other: patient is calling as his insurance needs a PA for CT Calcium Screening, please advise, thank you      Action Taken: Other: cardiology     Travel Screening: Not Applicable     Date of Service:

## 2025-02-10 NOTE — TELEPHONE ENCOUNTER
[facility]    Phone Message    May a detailed message be left on voicemail: yes     Reason for Call: Other: Pt is returning call to check on the status of the prior auth for the CT calcium screening     Action Taken: TE SENT    Travel Screening: Not Applicable     Date of Service:             Thank you!  [facility]

## 2025-02-12 ENCOUNTER — HOSPITAL ENCOUNTER (OUTPATIENT)
Dept: CT IMAGING | Facility: CLINIC | Age: 55
Discharge: HOME | End: 2025-02-12
Attending: INTERNAL MEDICINE
Payer: COMMERCIAL

## 2025-02-12 DIAGNOSIS — R07.2 PRECORDIAL PAIN: ICD-10-CM

## 2025-02-12 PROCEDURE — 75571 CT HRT W/O DYE W/CA TEST: CPT

## 2025-02-12 PROCEDURE — 75571 CT HRT W/O DYE W/CA TEST: CPT | Mod: 26 | Performed by: INTERNAL MEDICINE

## 2025-02-12 NOTE — TELEPHONE ENCOUNTER
2/10: Discussed with pt that a prior auth is not done for calcium scores and that this exam is an out-of-pocket exam. Pt verbalized frustrations for inconsistent information, apologized to him if he had received incorrect information, but that I am telling him what we tell every pt that is scheduled for a calcium score. Pt verbalized understanding and was ok with the estimate that ws provided. Also provided pt the cost of care estimate number to call as well. He had no other needs at this time. maggie

## 2025-02-13 ENCOUNTER — TRANSFERRED RECORDS (OUTPATIENT)
Dept: HEALTH INFORMATION MANAGEMENT | Facility: CLINIC | Age: 55
End: 2025-02-13
Payer: COMMERCIAL

## 2025-02-13 LAB
CV CALCIUM SCORE AGATSTON LM: 0
CV CALCIUM SCORING AGATSON LAD: 2
CV CALCIUM SCORING AGATSTON CX: 0
CV CALCIUM SCORING AGATSTON RCA: 71
CV CALCIUM SCORING AGATSTON TOTAL: 73

## 2025-02-22 ENCOUNTER — NURSE TRIAGE (OUTPATIENT)
Dept: NURSING | Facility: CLINIC | Age: 55
End: 2025-02-22
Payer: COMMERCIAL

## 2025-02-22 NOTE — TELEPHONE ENCOUNTER
Nurse Triage SBAR  Is this a 2nd Level Triage? YES, LICENSED PRACTITIONER REVIEW IS REQUIRED    Situation:   Severe left upper leg pain beginning last pm  Unable to sleep  Tylenol/ibuprofen no relief at all  Like a constant cramp 9/10   Standing makes pain worse     Background:  Began taking new medication, crestor last week    Assessment:  Denies;  Fever  Leg swelling/redness  Unable to stand  Difficulty breathing  Chest pain/pressure  Any recent or specific injuries    Protocol Recommended Disposition:   See a HCP within 4 hours or 2LT    Reason for page:   sudden onset of severe upper left leg pain not caused by injury and not relieved by OTC oral pain meds    Page sent to Dr. Wang by Answering Service at 7 am  Provider, Dr. Wang, returning page to Nurse Advisors at: immediate response from provider    Provider recommended plan of care:   stop statin for now.    Go to either ED or Urgency Room.  Leave it up to patient if one is closer/more convenient for him but he needs higher level than regular UC for eval/assessment.    Provider Recommendation Follow Up:   Reached patient/caregiver. Informed of provider's recommendations. Patient verbalized understanding and agrees with the plan.   Pt's spouse will take him to ED now    Does the patient meet one of the following criteria for ADS visit consideration? 16+ years old, with an [facility] PCP   Anayeli Cruz RN, Nurse Advisor 7:15 AM 2/22/2025  Reason for Disposition   [1] SEVERE pain (e.g., excruciating, unable to do any normal activities) AND [2] not improved after 2 hours of pain medicine    Additional Information   Negative: Looks like a broken bone or dislocated joint (e.g., crooked or deformed)   Negative: Sounds like a life-threatening emergency to the triager   Negative: Followed a leg injury   Negative: Leg swelling is main symptom   Negative: Back pain radiating (shooting) into leg(s)   Negative: Knee pain is main symptom   Negative: Ankle pain is main  symptom   Negative: Pregnant   Negative: Postpartum (from 0 to 6 weeks after delivery)   Negative: Chest pain   Negative: Difficulty breathing   Negative: Entire foot is cool or blue in comparison to other side   Negative: Unable to walk   Negative: [1] Red area or streak AND [2] fever   Negative: [1] Swollen joint AND [2] fever   Negative: [1] Cast on leg or ankle AND [2] now increased pain   Negative: Patient sounds very sick or weak to the triager    Protocols used: Leg Pain-A-AH

## 2025-02-23 ENCOUNTER — TRANSFERRED RECORDS (OUTPATIENT)
Dept: HEALTH INFORMATION MANAGEMENT | Facility: CLINIC | Age: 55
End: 2025-02-23
Payer: COMMERCIAL

## 2025-03-03 ENCOUNTER — PATIENT OUTREACH (OUTPATIENT)
Dept: CARE COORDINATION | Facility: CLINIC | Age: 55
End: 2025-03-03
Payer: COMMERCIAL

## 2025-03-10 ENCOUNTER — TELEPHONE (OUTPATIENT)
Dept: GASTROENTEROLOGY | Facility: CLINIC | Age: 55
End: 2025-03-10
Payer: COMMERCIAL

## 2025-03-10 DIAGNOSIS — Z12.11 SPECIAL SCREENING FOR MALIGNANT NEOPLASMS, COLON: Primary | ICD-10-CM

## 2025-03-10 RX ORDER — BISACODYL 5 MG/1
TABLET, DELAYED RELEASE ORAL
Qty: 4 TABLET | Refills: 0 | Status: SHIPPED | OUTPATIENT
Start: 2025-03-10

## 2025-03-10 NOTE — TELEPHONE ENCOUNTER
Bowel Prep Review:  Disclaimer: No call was made to the patient.     Extended Golytely bowel prep. Bowel prep sent to [id number] - [place], [place] - [address].   Recommended due to GLP-1 agonist medication noted in chart.   Instructions were sent via [employer].       aLli Hester RN  Endoscopy Procedure Pre Assessment

## 2025-04-29 ENCOUNTER — OFFICE VISIT (OUTPATIENT)
Dept: FAMILY MEDICINE | Facility: CLINIC | Age: 55
End: 2025-04-29
Attending: FAMILY MEDICINE
Payer: COMMERCIAL

## 2025-04-29 VITALS
HEIGHT: 73 IN | SYSTOLIC BLOOD PRESSURE: 138 MMHG | OXYGEN SATURATION: 97 % | RESPIRATION RATE: 16 BRPM | TEMPERATURE: 98.5 F | WEIGHT: 306.19 LBS | DIASTOLIC BLOOD PRESSURE: 82 MMHG | BODY MASS INDEX: 40.58 KG/M2 | HEART RATE: 82 BPM

## 2025-04-29 DIAGNOSIS — R00.2 PALPITATIONS: Primary | ICD-10-CM

## 2025-04-29 DIAGNOSIS — M54.16 LEFT LUMBAR RADICULOPATHY: ICD-10-CM

## 2025-04-29 DIAGNOSIS — I10 BENIGN ESSENTIAL HYPERTENSION: ICD-10-CM

## 2025-04-29 PROCEDURE — 3075F SYST BP GE 130 - 139MM HG: CPT | Performed by: FAMILY MEDICINE

## 2025-04-29 PROCEDURE — 3079F DIAST BP 80-89 MM HG: CPT | Performed by: FAMILY MEDICINE

## 2025-04-29 PROCEDURE — 99213 OFFICE O/P EST LOW 20 MIN: CPT | Performed by: FAMILY MEDICINE

## 2025-04-29 PROCEDURE — G2211 COMPLEX E/M VISIT ADD ON: HCPCS | Performed by: FAMILY MEDICINE

## 2025-04-29 RX ORDER — VALSARTAN AND HYDROCHLOROTHIAZIDE 160; 25 MG/1; MG/1
1 TABLET ORAL DAILY
Qty: 90 TABLET | Refills: 3 | Status: SHIPPED | OUTPATIENT
Start: 2025-04-29

## 2025-04-29 RX ORDER — HYDROCHLOROTHIAZIDE 12.5 MG/1
1 CAPSULE ORAL DAILY
Qty: 90 CAPSULE | Refills: 3 | Status: SHIPPED | OUTPATIENT
Start: 2025-04-29

## 2025-04-29 RX ORDER — CYCLOBENZAPRINE HCL 10 MG
TABLET ORAL 2 TIMES DAILY PRN
COMMUNITY
Start: 2025-02-13

## 2025-04-29 RX ORDER — GABAPENTIN 300 MG/1
300 CAPSULE ORAL DAILY
COMMUNITY

## 2025-04-29 NOTE — PROGRESS NOTES
"  Assessment & Plan   Problem List Items Addressed This Visit       Benign essential hypertension     Blood pressure within target range.  Refill of medications sent to pharmacy.          Relevant Medications    hydrochlorothiazide (MICROZIDE) 12.5 MG capsule    valsartan-hydrochlorothiazide (DIOVAN HCT) 160-25 MG tablet    Left lumbar radiculopathy     Symptoms reviewed.  S/p surgery with somewhat prolonged recovery.           Relevant Medications    cyclobenzaprine (FLEXERIL) 10 MG tablet    gabapentin (NEURONTIN) 300 MG capsule    Palpitations - Primary     Continue to be bothersome at times.  Work-up was not completed given other health conditions but is now scheduled.  He will follow up with cardiology next month.                   BMI  Estimated body mass index is 40.4 kg/m  as calculated from the following:    Height as of this encounter: 1.854 m (6' 1\").    Weight as of this encounter: 138.9 kg (306 lb 3 oz).   Weight management plan: Specific weight management program called [facility] Weight [facility] discussed    The longitudinal plan of care for the diagnosis(es)/condition(s) as documented were addressed during this visit. Due to the added complexity in care, I will continue to support Attila in the subsequent management and with ongoing continuity of care.    Subjective   Attila is a 54 year old, presenting for the following health issues:  Follow Up (surgery) and Medication Update      4/29/2025     1:04 PM   Additional Questions   Roomed by sac   Accompanied by self     Lumbar radiculopathy.  S/p laminectomy and microdiscectomy.  In PT.  Urination now normal    Heart:  palipitations     History of Present Illness       Reason for visit:  Surgery follow up   He is taking medications regularly.          Objective    There were no vitals taken for this visit.  There is no height or weight on file to calculate BMI.  Physical Exam  Nursing note reviewed.   Constitutional:       General: He is not in " acute distress.     Appearance: Normal appearance. He is not ill-appearing.   HENT:      Head: Normocephalic and atraumatic.   Eyes:      Extraocular Movements: Extraocular movements intact.      Conjunctiva/sclera: Conjunctivae normal.   Pulmonary:      Effort: Pulmonary effort is normal.   Neurological:      Mental Status: He is alert and oriented to person, place, and time.   Psychiatric:         Attention and Perception: Attention normal.         Mood and Affect: Mood normal.         Speech: Speech normal.         Thought Content: Thought content normal.              Signed Electronically by: Johnson Jeffrey MD

## 2025-04-30 NOTE — ASSESSMENT & PLAN NOTE
Continue to be bothersome at times.  Work-up was not completed given other health conditions but is now scheduled.  He will follow up with cardiology next month.

## 2025-05-21 ENCOUNTER — OFFICE VISIT (OUTPATIENT)
Dept: FAMILY MEDICINE | Facility: CLINIC | Age: 55
End: 2025-05-21
Payer: COMMERCIAL

## 2025-05-21 VITALS
TEMPERATURE: 97.7 F | SYSTOLIC BLOOD PRESSURE: 116 MMHG | HEIGHT: 73 IN | OXYGEN SATURATION: 98 % | BODY MASS INDEX: 39.77 KG/M2 | WEIGHT: 300.1 LBS | DIASTOLIC BLOOD PRESSURE: 83 MMHG | HEART RATE: 74 BPM | RESPIRATION RATE: 16 BRPM

## 2025-05-21 DIAGNOSIS — F43.21 ADJUSTMENT DISORDER WITH DEPRESSED MOOD: ICD-10-CM

## 2025-05-21 DIAGNOSIS — Z84.89: Primary | ICD-10-CM

## 2025-05-21 PROCEDURE — 3074F SYST BP LT 130 MM HG: CPT | Performed by: FAMILY MEDICINE

## 2025-05-21 PROCEDURE — 99214 OFFICE O/P EST MOD 30 MIN: CPT | Performed by: FAMILY MEDICINE

## 2025-05-21 PROCEDURE — 3079F DIAST BP 80-89 MM HG: CPT | Performed by: FAMILY MEDICINE

## 2025-05-21 PROCEDURE — G2211 COMPLEX E/M VISIT ADD ON: HCPCS | Performed by: FAMILY MEDICINE

## 2025-05-21 RX ORDER — BUPROPION HYDROCHLORIDE 150 MG/1
150 TABLET ORAL EVERY MORNING
Qty: 30 TABLET | Refills: 2 | Status: SHIPPED | OUTPATIENT
Start: 2025-05-21

## 2025-05-21 ASSESSMENT — ANXIETY QUESTIONNAIRES
1. FEELING NERVOUS, ANXIOUS, OR ON EDGE: NOT AT ALL
3. WORRYING TOO MUCH ABOUT DIFFERENT THINGS: SEVERAL DAYS
2. NOT BEING ABLE TO STOP OR CONTROL WORRYING: SEVERAL DAYS
5. BEING SO RESTLESS THAT IT IS HARD TO SIT STILL: NOT AT ALL
6. BECOMING EASILY ANNOYED OR IRRITABLE: NOT AT ALL
IF YOU CHECKED OFF ANY PROBLEMS ON THIS QUESTIONNAIRE, HOW DIFFICULT HAVE THESE PROBLEMS MADE IT FOR YOU TO DO YOUR WORK, TAKE CARE OF THINGS AT HOME, OR GET ALONG WITH OTHER PEOPLE: NOT DIFFICULT AT ALL
GAD7 TOTAL SCORE: 3
GAD7 TOTAL SCORE: 3
7. FEELING AFRAID AS IF SOMETHING AWFUL MIGHT HAPPEN: SEVERAL DAYS

## 2025-05-21 ASSESSMENT — PATIENT HEALTH QUESTIONNAIRE - PHQ9
5. POOR APPETITE OR OVEREATING: NOT AT ALL
SUM OF ALL RESPONSES TO PHQ QUESTIONS 1-9: 3

## 2025-05-21 NOTE — PROGRESS NOTES
Assessment & Plan   Problem List Items Addressed This Visit       Adjustment disorder with depressed mood    Patient describes anhedonia and dysthymia for the past 5 to 6 months.  He has kept on waiting for it to get better.  Family history of bipolar in his father.  The patient has been on a couple of SSRIs in the past.  On fluoxetine he felt emotionally flat.  He was only on sertraline in 2019 for 2 to 3 months.  We discussed pharmaceutical options today.  Trial of extended release bupropion.  150 mg.  He will update us with progress through the [facility] system.         Relevant Medications    buPROPion (WELLBUTRIN XL) 150 MG 24 hr tablet    Other Relevant Orders    Testosterone Free and Total    Family history of adverse response to anesthesia in mother - Primary    We will determine the appropriate test to evaluate for metabolism deficiencies for succinylcholine.  Family history in his mother.             The longitudinal plan of care for the diagnosis(es)/condition(s) as documented were addressed during this visit. Due to the added complexity in care, I will continue to support Attila in the subsequent management and with ongoing continuity of care.       Subjective   Attila is a 54 year old, presenting for the following health issues:  Blood Draw (Need blood test for reaction to anesthesia) and Depression (Follow-up )        5/21/2025    11:34 AM   Additional Questions   Roomed by xl     Mood concern:   - lack of yifan in 2025.  Struggling with back pain.  Kids are transitioning.     - Functioning okay.  Sleep okay. Marriage:    -     History of Present Illness       Reason for visit:  Need to be tested for reaction to anesthesia   He is taking medications regularly.            5/21/2025    11:37 AM   PHQ   PHQ-9 Total Score 3   Q9: Thoughts of better off dead/self-harm past 2 weeks Not at all         5/21/2025    11:37 AM   DAHLIA-7 SCORE   Total Score 3           Objective    /83 (BP Location: Left arm,  "Patient Position: Sitting, Cuff Size: Adult Large)   Pulse 74   Temp 97.7  F (36.5  C) (Oral)   Resp 16   Ht 1.854 m (6' 1\")   Wt (!) 136.1 kg (300 lb 1.6 oz)   SpO2 98%   BMI 39.59 kg/m    Body mass index is 39.59 kg/m .  Physical Exam  Nursing note reviewed.   Constitutional:       General: He is not in acute distress.     Appearance: Normal appearance. He is not ill-appearing.   HENT:      Head: Normocephalic and atraumatic.   Eyes:      Extraocular Movements: Extraocular movements intact.      Conjunctiva/sclera: Conjunctivae normal.   Pulmonary:      Effort: Pulmonary effort is normal.   Neurological:      Mental Status: He is alert and oriented to person, place, and time.   Psychiatric:         Attention and Perception: Attention normal.         Mood and Affect: Mood normal.         Speech: Speech normal.         Thought Content: Thought content normal.                  Signed Electronically by: Johnson Jeffrey MD    "

## 2025-05-21 NOTE — ASSESSMENT & PLAN NOTE
Patient describes anhedonia and dysthymia for the past 5 to 6 months.  He has kept on waiting for it to get better.  Family history of bipolar in his father.  The patient has been on a couple of SSRIs in the past.  On fluoxetine he felt emotionally flat.  He was only on sertraline in 2019 for 2 to 3 months.  We discussed pharmaceutical options today.  Trial of extended release bupropion.  150 mg.  He will update us with progress through the [facility] system.

## 2025-05-21 NOTE — ASSESSMENT & PLAN NOTE
We will determine the appropriate test to evaluate for metabolism deficiencies for succinylcholine.  Family history in his mother.

## 2025-05-22 ENCOUNTER — RESULTS FOLLOW-UP (OUTPATIENT)
Dept: CARDIOLOGY | Facility: CLINIC | Age: 55
End: 2025-05-22

## 2025-05-22 ENCOUNTER — HOSPITAL ENCOUNTER (OUTPATIENT)
Dept: CARDIOLOGY | Facility: HOSPITAL | Age: 55
End: 2025-05-22
Attending: INTERNAL MEDICINE
Payer: COMMERCIAL

## 2025-05-22 DIAGNOSIS — R00.2 PALPITATIONS: ICD-10-CM

## 2025-05-22 DIAGNOSIS — R07.2 PRECORDIAL PAIN: ICD-10-CM

## 2025-05-22 LAB — LVEF ECHO: NORMAL

## 2025-05-22 PROCEDURE — 255N000002 HC RX 255 OP 636: Performed by: INTERNAL MEDICINE

## 2025-05-22 RX ADMIN — PERFLUTREN 2 ML: 6.52 INJECTION, SUSPENSION INTRAVENOUS at 09:40

## 2025-05-27 ENCOUNTER — OFFICE VISIT (OUTPATIENT)
Dept: CARDIOLOGY | Facility: CLINIC | Age: 55
End: 2025-05-27
Attending: INTERNAL MEDICINE
Payer: COMMERCIAL

## 2025-05-27 VITALS
RESPIRATION RATE: 14 BRPM | WEIGHT: 298 LBS | HEART RATE: 84 BPM | SYSTOLIC BLOOD PRESSURE: 116 MMHG | BODY MASS INDEX: 39.32 KG/M2 | DIASTOLIC BLOOD PRESSURE: 82 MMHG

## 2025-05-27 DIAGNOSIS — I25.10 CORONARY ARTERY DISEASE DUE TO LIPID RICH PLAQUE: ICD-10-CM

## 2025-05-27 DIAGNOSIS — I25.83 CORONARY ARTERY DISEASE DUE TO LIPID RICH PLAQUE: ICD-10-CM

## 2025-05-27 PROCEDURE — 99213 OFFICE O/P EST LOW 20 MIN: CPT

## 2025-05-27 PROCEDURE — 3074F SYST BP LT 130 MM HG: CPT

## 2025-05-27 PROCEDURE — G2211 COMPLEX E/M VISIT ADD ON: HCPCS

## 2025-05-27 PROCEDURE — 3079F DIAST BP 80-89 MM HG: CPT

## 2025-05-27 RX ORDER — ROSUVASTATIN CALCIUM 20 MG/1
20 TABLET, COATED ORAL DAILY
Qty: 90 TABLET | Refills: 3 | Status: SHIPPED | OUTPATIENT
Start: 2025-05-27

## 2025-05-27 NOTE — LETTER
5/27/2025    Johnson Jeffrey MD  [address]  [facility] [address]    RE: Attila OWUSU Aj       Dear Colleague,     I had the pleasure of seeing Attila Rocha in the [facility].  HEART CARE ENCOUNTER NOTE      [facility]  [phone]    Primary Care: Johnson Jeffrey  Primary Cardiologist: Dr. Welch    Assessment/Recommendations   Assessment:  PACs, palpitations  Seen on previous Zio patch.  Reports resolution of symptoms  Hypertension  Controlled in the office  Dyslipidemia  LDL 96, ASCVD risk of 5.5%.  Calcium score of 73 placing him in the 75th percentile  Okay with restarting Crestor    Other pertinent medical hx reviewed:  Obesity  Depression    Plan:  Restart Crestor 20 mg once nightly  Repeat FLP in 8 to 12 weeks  In the absence of any new cardiac symptoms recommended following with primary care for continued cholesterol management    Follow up with Dr. Welch as needed.    The longitudinal plan of care for the diagnosis(es)/condition(s) as documented were addressed during this visit. Due to the added complexity in care, I will continue to support Attila in the subsequent management and with ongoing continuity of care.      History of Present Illness/Subjective     Attila Rocha is a 54 year old male with PMHx of obesity, depression, back pain, PACs, HTN, dyslipidemia presenting for follow-up.      He was last seen by Dr. Welch on 1/30/2025.  At that time, patient was presenting for palpitations without any associated symptoms.  He was evaluated in the ED previously for this and a Zio patch which showed occasional PACs.  He was asked to get updated echo, coronary calcium score.  He had elevated calcium score of 73 with the majority in the RCA.  He was recommended rosuvastatin 20 mg.  His echo showed no significant abnormalities.    Patient underwent left L3-L4 minimally invasive hemilaminotomy and microdiscectomy in March 2025 at  [facility].  Seen 2 weeks later in the ED for chest pain.  Troponin was normal.  Chest x-ray unremarkable, D-dimer normal.  He was discharged    Since then he has been feeling well and has no cardiac complaints.  He denies any recurrence of his palpitations.  He also reports he stopped taking his Crestor after a few days because of leg pain but found out this was from his back.  He is willing to restart the Crestor but wanted to discuss further why he is taking it and with the results of his CT calcium score means.    He denies fatigue, lightheadedness, shortness of breath, dyspnea on exertion, orthopnea, PND, palpitations, chest pain, abdominal fullness/bloating, and lower extremity edema.     Cardiac Testing     ECG, 3/13/2025: Sinus rhythm with sinus arrhythmia      Echo:    TTE, 5/22/2025  1.Left ventricular size, wall motion and function are normal. The ejection  fraction is 60-65%.  2.Mildly decreased right ventricular systolic function  3.The left atrium is mild to moderately dilated.  4.No hemodynamically significant valvular abnormalities on 2D or color flow  imaging.  5.Aortic root at 42 mm  There is no comparison study available.      CT calcium score, 2/12/2025:    Calcium Scoring: The total Agatston score is 73. A calcium score in this range places the individual in the 75th percentile when compared to an age and gender matched control group and implies a high risk of cardiac events in the next ten years.       Zio 1/21/2025:  Zio monitoring from 1/10/2025 to 1/17/2025 (duration 6d 16h)  Predominant rhythm was sinus rhythm, 44 to 142bpm, average 79bpm.  12 episode(s) of nonsustained supraventricular tachycardia - longest 8 beats, fastest 143bpm.  No sustained tachyarrhythmias.  No atrial fibrillation.  There were no pauses of greater than 3 seconds.  Occasional supraventricular ectopic beats (isolated 4.9%).  Rare premature ventricular contractions (isolated <1%).  Symptom triggers and diary entries  (12) correlated to sinus rhythm with and without PACs, PVCs.    Labs:  LDL 96  Creatinine 0.89  Potassium 3.8  A1c 5.1  Hemoglobin 15.6  Platelet 226      Physical Examination    Vitals: /82 (BP Location: Right arm, Patient Position: Sitting, Cuff Size: Adult Large)   Pulse 84   Resp 14   Wt 135.2 kg (298 lb)   BMI 39.32 kg/m      General: Well appearing, in no acute distress. Resting comfortably in exam chair  Skin: No clubbing, no cyanosis.  Eyes: Extra ocular movements intact  Neck: No jugular venous distention, no carotid bruits, carotids have a normal upstroke  Lungs: Clear to auscultation bilaterally, no wheezing or rhonchi.  Heart: Regular rhythm, PMI not displaced, S1, S2 normal, no S3, no S4, no heaves, no rub and no murmur.  Abdomen: Soft, nontender  Extremities: No peripheral edema . Grade 2/4 distal pulses bilaterally.  Neuro: Oriented to person, place and time, alert, cooperative, gait coordinated.    BP Readings from Last 3 Encounters:   05/27/25 116/82   05/21/25 116/83   04/29/25 138/82       Pulse Readings from Last 3 Encounters:   05/27/25 84   05/21/25 74   04/29/25 82       Wt Readings from Last 3 Encounters:   05/27/25 135.2 kg (298 lb)   05/21/25 (!) 136.1 kg (300 lb 1.6 oz)   04/29/25 (!) 138.9 kg (306 lb 3 oz)         Review of Systems      Please refer above for cardiac ROS details.       History     MEDICAL HISTORY:  Past Medical History:   Diagnosis Date     Anxiety      COVID-19 01/22/2021     Hypertension      Sleep apnea      SURGICAL HISTORY  Past Surgical History:   Procedure Laterality Date     BACK SURGERY       COLONOSCOPY N/A 11/26/2019    Procedure: COLONOSCOPY;  Surgeon: Twin Jaramillo MD;  Location: [facility];  Service: General     KNEE SURGERY        FAMILY HISTORY:  Family History   Problem Relation Age of Onset     Hypertension Mother      Breast Cancer Mother      Bipolar Disorder Father      Mental Illness Father         Bi Polar Disorder     No Known  Problems Sister      Diabetes Maternal Grandmother      Colon Cancer Maternal Grandmother      Colon Cancer Paternal Grandfather      No Known Problems Son      No Known Problems Son      Heart Disease No family hx of      Prostate Cancer No family hx of      Coronary Artery Disease No family hx of     FAMILY HISTORY:  Family History   Problem Relation Age of Onset     Hypertension Mother      Breast Cancer Mother      Bipolar Disorder Father      Mental Illness Father         Bi Polar Disorder     No Known Problems Sister      Diabetes Maternal Grandmother      Colon Cancer Maternal Grandmother      Colon Cancer Paternal Grandfather      No Known Problems Son      No Known Problems Son      Heart Disease No family hx of      Prostate Cancer No family hx of      Coronary Artery Disease No family hx of       SOCIAL HISTORY:  Social History     Socioeconomic History     Marital status:      Spouse name: Not on file     Number of children: Not on file     Years of education: Not on file     Highest education level: Not on file   Occupational History     Not on file   Tobacco Use     Smoking status: Never     Passive exposure: Never     Smokeless tobacco: Never   Vaping Use     Vaping status: Never Used   Substance and Sexual Activity     Alcohol use: No     Drug use: No     Sexual activity: Yes     Partners: Female     Birth control/protection: Male Surgical     Comment: I had vasectomy in 2003   Other Topics Concern     Parent/sibling w/ CABG, MI or angioplasty before 65F 55M? No   Social History Narrative    10/4/2019  at [facility] [address].      Social Drivers of Health     Financial Resource Strain: Low Risk  (10/31/2024)    Financial Resource Strain      Within the past 12 months, have you or your family members you live with been unable to get utilities (heat, electricity) when it was really needed?: No   Food Insecurity: No Food Insecurity (2/26/2025)    Received from  [facility]    Hunger Vital Sign      Worried About Running Out of Food in the Last Year: Never true      Ran Out of Food in the Last Year: Never true   Transportation Needs: No Transportation Needs (2/26/2025)    Received from [facility]    PRAPARE - Transportation      Lack of Transportation (Medical): No      Lack of Transportation (Non-Medical): No   Physical Activity: Sufficiently Active (10/31/2024)    Exercise Vital Sign      Days of Exercise per Week: 5 days      Minutes of Exercise per Session: 70 min   Stress: No Stress Concern Present (10/31/2024)    Citizen of [place] of Occupational Health - Occupational Stress Questionnaire      Feeling of Stress : Only a little   Social Connections: Unknown (10/31/2024)    Social Connection and Isolation Panel [NHANES]      Frequency of Communication with Friends and Family: Not on file      Frequency of Social Gatherings with Friends and Family: Twice a week      Attends Mu-ism Services: Not on file      Active Member of Clubs or Organizations: Not on file      Attends Club or Organization Meetings: Not on file      Marital Status: Not on file   Interpersonal Safety: Low Risk  (5/21/2025)    Interpersonal Safety      Do you feel physically and emotionally safe where you currently live?: Yes      Within the past 12 months, have you been hit, slapped, kicked or otherwise physically hurt by someone?: No      Within the past 12 months, have you been humiliated or emotionally abused in other ways by your partner or ex-partner?: No   Housing Stability: Low Risk  (2/26/2025)    Received from [facility]    Housing Stability Vital Sign      Unable to Pay for Housing in the Last Year: No      Number of Times Moved in the Last Year: 1      Homeless in the Last Year: No    ALLERGIES:  Allergies   Allergen Reactions     Penicillins Other (See Comments) and Rash     Rash               Medications:     Current Outpatient Medications   Medication Sig Dispense Refill      buPROPion (WELLBUTRIN XL) 150 MG 24 hr tablet Take 1 tablet (150 mg) by mouth every morning. 30 tablet 2     Cholecalciferol (VITAMIN D-3 PO)        cyclobenzaprine (FLEXERIL) 10 MG tablet 2 times daily as needed.       gabapentin (NEURONTIN) 300 MG capsule Take 300 mg by mouth as needed.       hydrochlorothiazide (MICROZIDE) 12.5 MG capsule Take 1 capsule (12.5 mg) by mouth daily. (Take as needed for swelling) 90 capsule 3     loratadine (CLARITIN) 10 MG tablet Take 10 mg by mouth daily.       rosuvastatin (CRESTOR) 20 MG tablet Take 1 tablet (20 mg) by mouth daily. 90 tablet 3     Semaglutide-Weight Management (WEGOVY) 2.4 MG/0.75ML pen Inject 2.4 mg subcutaneously once a week. 3 mL 11     valsartan-hydrochlorothiazide (DIOVAN HCT) 160-25 MG tablet Take 1 tablet by mouth daily. 90 tablet 3           Abhilash Brunner PA-C  May 27, 2025    This note was partially generated using Dragon voice recognition system, and there may be some incorrect words,  spellings, and punctuation that were not noted in checking the note before saving.    Thank you for allowing me to participate in the care of your patient.      Sincerely,     Abhilash Brunner PA-C     [facility]  cc:   Wilfrid Welch MD  [address]  [address]  [place] [address]

## 2025-05-27 NOTE — PROGRESS NOTES
HEART CARE ENCOUNTER NOTE      [facility]  [phone]    Primary Care: Johnson Jeffrey  Primary Cardiologist: Dr. Welch    Assessment/Recommendations   Assessment:  PACs, palpitations  Seen on previous Zio patch.  Reports resolution of symptoms  Hypertension  Controlled in the office  Dyslipidemia  LDL 96, ASCVD risk of 5.5%.  Calcium score of 73 placing him in the 75th percentile  Okay with restarting Crestor    Other pertinent medical hx reviewed:  Obesity  Depression    Plan:  Restart Crestor 20 mg once nightly  Repeat FLP in 8 to 12 weeks  In the absence of any new cardiac symptoms recommended following with primary care for continued cholesterol management    Follow up with Dr. Welch as needed.    The longitudinal plan of care for the diagnosis(es)/condition(s) as documented were addressed during this visit. Due to the added complexity in care, I will continue to support Attila in the subsequent management and with ongoing continuity of care.      History of Present Illness/Subjective     Attila Rocha is a 54 year old male with PMHx of obesity, depression, back pain, PACs, HTN, dyslipidemia presenting for follow-up.      He was last seen by Dr. Welch on 1/30/2025.  At that time, patient was presenting for palpitations without any associated symptoms.  He was evaluated in the ED previously for this and a Zio patch which showed occasional PACs.  He was asked to get updated echo, coronary calcium score.  He had elevated calcium score of 73 with the majority in the RCA.  He was recommended rosuvastatin 20 mg.  His echo showed no significant abnormalities.    Patient underwent left L3-L4 minimally invasive hemilaminotomy and microdiscectomy in March 2025 at [facility].  Seen 2 weeks later in the ED for chest pain.  Troponin was normal.  Chest x-ray unremarkable, D-dimer normal.  He was discharged    Since then he has been feeling well and has no cardiac complaints.  He  denies any recurrence of his palpitations.  He also reports he stopped taking his Crestor after a few days because of leg pain but found out this was from his back.  He is willing to restart the Crestor but wanted to discuss further why he is taking it and with the results of his CT calcium score means.    He denies fatigue, lightheadedness, shortness of breath, dyspnea on exertion, orthopnea, PND, palpitations, chest pain, abdominal fullness/bloating, and lower extremity edema.     Cardiac Testing     ECG, 3/13/2025: Sinus rhythm with sinus arrhythmia      Echo:    TTE, 5/22/2025  1.Left ventricular size, wall motion and function are normal. The ejection  fraction is 60-65%.  2.Mildly decreased right ventricular systolic function  3.The left atrium is mild to moderately dilated.  4.No hemodynamically significant valvular abnormalities on 2D or color flow  imaging.  5.Aortic root at 42 mm  There is no comparison study available.      CT calcium score, 2/12/2025:   Calcium Scoring: The total Agatston score is 73. A calcium score in this range places the individual in the 75th percentile when compared to an age and gender matched control group and implies a high risk of cardiac events in the next ten years.       Zio 1/21/2025:  Zio monitoring from 1/10/2025 to 1/17/2025 (duration 6d 16h)  Predominant rhythm was sinus rhythm, 44 to 142bpm, average 79bpm.  12 episode(s) of nonsustained supraventricular tachycardia - longest 8 beats, fastest 143bpm.  No sustained tachyarrhythmias.  No atrial fibrillation.  There were no pauses of greater than 3 seconds.  Occasional supraventricular ectopic beats (isolated 4.9%).  Rare premature ventricular contractions (isolated <1%).  Symptom triggers and diary entries (12) correlated to sinus rhythm with and without PACs, PVCs.    Labs:  LDL 96  Creatinine 0.89  Potassium 3.8  A1c 5.1  Hemoglobin 15.6  Platelet 226      Physical Examination    Vitals: /82 (BP Location: Right  arm, Patient Position: Sitting, Cuff Size: Adult Large)   Pulse 84   Resp 14   Wt 135.2 kg (298 lb)   BMI 39.32 kg/m      General: Well appearing, in no acute distress. Resting comfortably in exam chair  Skin: No clubbing, no cyanosis.  Eyes: Extra ocular movements intact  Neck: No jugular venous distention, no carotid bruits, carotids have a normal upstroke  Lungs: Clear to auscultation bilaterally, no wheezing or rhonchi.  Heart: Regular rhythm, PMI not displaced, S1, S2 normal, no S3, no S4, no heaves, no rub and no murmur.  Abdomen: Soft, nontender  Extremities: No peripheral edema . Grade 2/4 distal pulses bilaterally.  Neuro: Oriented to person, place and time, alert, cooperative, gait coordinated.    BP Readings from Last 3 Encounters:   05/27/25 116/82   05/21/25 116/83   04/29/25 138/82       Pulse Readings from Last 3 Encounters:   05/27/25 84   05/21/25 74   04/29/25 82       Wt Readings from Last 3 Encounters:   05/27/25 135.2 kg (298 lb)   05/21/25 (!) 136.1 kg (300 lb 1.6 oz)   04/29/25 (!) 138.9 kg (306 lb 3 oz)         Review of Systems      Please refer above for cardiac ROS details.       History     MEDICAL HISTORY:  Past Medical History:   Diagnosis Date    Anxiety     COVID-19 01/22/2021    Hypertension     Sleep apnea      SURGICAL HISTORY  Past Surgical History:   Procedure Laterality Date    BACK SURGERY      COLONOSCOPY N/A 11/26/2019    Procedure: COLONOSCOPY;  Surgeon: Twin Jaramillo MD;  Location: [facility];  Service: General    KNEE SURGERY        FAMILY HISTORY:  Family History   Problem Relation Age of Onset    Hypertension Mother     Breast Cancer Mother     Bipolar Disorder Father     Mental Illness Father         Bi Polar Disorder    No Known Problems Sister     Diabetes Maternal Grandmother     Colon Cancer Maternal Grandmother     Colon Cancer Paternal Grandfather     No Known Problems Son     No Known Problems Son     Heart Disease No family hx of     Prostate  Cancer No family hx of     Coronary Artery Disease No family hx of     FAMILY HISTORY:  Family History   Problem Relation Age of Onset    Hypertension Mother     Breast Cancer Mother     Bipolar Disorder Father     Mental Illness Father         Bi Polar Disorder    No Known Problems Sister     Diabetes Maternal Grandmother     Colon Cancer Maternal Grandmother     Colon Cancer Paternal Grandfather     No Known Problems Son     No Known Problems Son     Heart Disease No family hx of     Prostate Cancer No family hx of     Coronary Artery Disease No family hx of       SOCIAL HISTORY:  Social History     Socioeconomic History    Marital status:      Spouse name: Not on file    Number of children: Not on file    Years of education: Not on file    Highest education level: Not on file   Occupational History    Not on file   Tobacco Use    Smoking status: Never     Passive exposure: Never    Smokeless tobacco: Never   Vaping Use    Vaping status: Never Used   Substance and Sexual Activity    Alcohol use: No    Drug use: No    Sexual activity: Yes     Partners: Female     Birth control/protection: Male Surgical     Comment: I had vasectomy in 2003   Other Topics Concern    Parent/sibling w/ CABG, MI or angioplasty before 65F 55M? No   Social History Narrative    10/4/2019  at [employer] [facility] [place] on [address].      Social Drivers of Health     Financial Resource Strain: Low Risk  (10/31/2024)    Financial Resource Strain     Within the past 12 months, have you or your family members you live with been unable to get utilities (heat, electricity) when it was really needed?: No   Food Insecurity: No Food Insecurity (2/26/2025)    Received from [employer]    Hunger Vital Sign     Worried About Running Out of Food in the Last Year: Never true     Ran Out of Food in the Last Year: Never true   Transportation Needs: No Transportation Needs (2/26/2025)    Received from [employer]    PRAPARE -  Transportation     Lack of Transportation (Medical): No     Lack of Transportation (Non-Medical): No   Physical Activity: Sufficiently Active (10/31/2024)    Exercise Vital Sign     Days of Exercise per Week: 5 days     Minutes of Exercise per Session: 70 min   Stress: No Stress Concern Present (10/31/2024)    Citizen of the [place] of Occupational Health - Occupational Stress Questionnaire     Feeling of Stress : Only a little   Social Connections: Unknown (10/31/2024)    Social Connection and Isolation Panel [NHANES]     Frequency of Communication with Friends and Family: Not on file     Frequency of Social Gatherings with Friends and Family: Twice a week     Attends Alevism Services: Not on file     Active Member of Clubs or Organizations: Not on file     Attends Club or Organization Meetings: Not on file     Marital Status: Not on file   Interpersonal Safety: Low Risk  (5/21/2025)    Interpersonal Safety     Do you feel physically and emotionally safe where you currently live?: Yes     Within the past 12 months, have you been hit, slapped, kicked or otherwise physically hurt by someone?: No     Within the past 12 months, have you been humiliated or emotionally abused in other ways by your partner or ex-partner?: No   Housing Stability: Low Risk  (2/26/2025)    Received from [email or web address]    Housing Stability Vital Sign     Unable to Pay for Housing in the Last Year: No     Number of Times Moved in the Last Year: 1     Homeless in the Last Year: No    ALLERGIES:  Allergies   Allergen Reactions    Penicillins Other (See Comments) and Rash     Rash               Medications:     Current Outpatient Medications   Medication Sig Dispense Refill    buPROPion (WELLBUTRIN XL) 150 MG 24 hr tablet Take 1 tablet (150 mg) by mouth every morning. 30 tablet 2    Cholecalciferol (VITAMIN D-3 PO)       cyclobenzaprine (FLEXERIL) 10 MG tablet 2 times daily as needed.      gabapentin (NEURONTIN) 300 MG capsule Take 300 mg by mouth  as needed.      hydrochlorothiazide (MICROZIDE) 12.5 MG capsule Take 1 capsule (12.5 mg) by mouth daily. (Take as needed for swelling) 90 capsule 3    loratadine (CLARITIN) 10 MG tablet Take 10 mg by mouth daily.      rosuvastatin (CRESTOR) 20 MG tablet Take 1 tablet (20 mg) by mouth daily. 90 tablet 3    Semaglutide-Weight Management (WEGOVY) 2.4 MG/0.75ML pen Inject 2.4 mg subcutaneously once a week. 3 mL 11    valsartan-hydrochlorothiazide (DIOVAN HCT) 160-25 MG tablet Take 1 tablet by mouth daily. 90 tablet 3           Abhilash Brunner PA-C  May 27, 2025    This note was partially generated using Dragon voice recognition system, and there may be some incorrect words,  spellings, and punctuation that were not noted in checking the note before saving.

## 2025-05-27 NOTE — PATIENT INSTRUCTIONS
It was great to see you today! Your care team includes myself and Dr. Welch.    Recommendations:  Restart the crestor 20mg.  Let us know if you have side effects from it.  Try taking it at night and with CoQ10 to reduce side effects.    Continue a heart healthy and low sodium diet (plant based or mediterranean diet).  Work on increasing physical activity with an end goal of 150 minutes per week of mild to moderate intensity exercise (brisk walk, biking, swimming)     Follow up with Dr. Villasenor in 1 year.      If you have questions, concerns, or new concerning symptoms prior to your next appointment, please contact the Cardiology Nursing team at [phone].    For scheduling issues/changes, please call [phone].

## 2025-06-04 ENCOUNTER — MYC MEDICAL ADVICE (OUTPATIENT)
Dept: FAMILY MEDICINE | Facility: CLINIC | Age: 55
End: 2025-06-04
Payer: COMMERCIAL

## 2025-06-04 DIAGNOSIS — Z84.89: Primary | ICD-10-CM

## 2025-06-22 ENCOUNTER — RESULTS FOLLOW-UP (OUTPATIENT)
Dept: FAMILY MEDICINE | Facility: CLINIC | Age: 55
End: 2025-06-22
Payer: COMMERCIAL

## 2025-06-22 DIAGNOSIS — R79.89 LOW TESTOSTERONE IN MALE: Primary | ICD-10-CM

## 2025-07-01 ENCOUNTER — MYC REFILL (OUTPATIENT)
Dept: FAMILY MEDICINE | Facility: CLINIC | Age: 55
End: 2025-07-01
Payer: COMMERCIAL

## 2025-07-01 ENCOUNTER — TELEPHONE (OUTPATIENT)
Dept: FAMILY MEDICINE | Facility: CLINIC | Age: 55
End: 2025-07-01

## 2025-07-01 DIAGNOSIS — E66.812 CLASS 2 SEVERE OBESITY DUE TO EXCESS CALORIES WITH SERIOUS COMORBIDITY AND BODY MASS INDEX (BMI) OF 38.0 TO 38.9 IN ADULT (H): ICD-10-CM

## 2025-07-01 DIAGNOSIS — E66.01 CLASS 2 SEVERE OBESITY DUE TO EXCESS CALORIES WITH SERIOUS COMORBIDITY AND BODY MASS INDEX (BMI) OF 38.0 TO 38.9 IN ADULT (H): ICD-10-CM

## 2025-07-01 DIAGNOSIS — I10 BENIGN ESSENTIAL HYPERTENSION: ICD-10-CM

## 2025-07-01 RX ORDER — SEMAGLUTIDE 2.4 MG/.75ML
2.4 INJECTION, SOLUTION SUBCUTANEOUS WEEKLY
Qty: 3 ML | Refills: 11 | Status: CANCELLED | OUTPATIENT
Start: 2025-07-01

## 2025-07-02 ENCOUNTER — PATIENT OUTREACH (OUTPATIENT)
Dept: CARE COORDINATION | Facility: CLINIC | Age: 55
End: 2025-07-02
Payer: COMMERCIAL

## 2025-07-08 ENCOUNTER — MYC REFILL (OUTPATIENT)
Dept: FAMILY MEDICINE | Facility: CLINIC | Age: 55
End: 2025-07-08
Payer: COMMERCIAL

## 2025-07-08 DIAGNOSIS — E66.01 CLASS 2 SEVERE OBESITY DUE TO EXCESS CALORIES WITH SERIOUS COMORBIDITY AND BODY MASS INDEX (BMI) OF 38.0 TO 38.9 IN ADULT (H): ICD-10-CM

## 2025-07-08 DIAGNOSIS — I10 BENIGN ESSENTIAL HYPERTENSION: ICD-10-CM

## 2025-07-08 DIAGNOSIS — E66.812 CLASS 2 SEVERE OBESITY DUE TO EXCESS CALORIES WITH SERIOUS COMORBIDITY AND BODY MASS INDEX (BMI) OF 38.0 TO 38.9 IN ADULT (H): ICD-10-CM

## 2025-07-08 RX ORDER — SEMAGLUTIDE 2.4 MG/.75ML
2.4 INJECTION, SOLUTION SUBCUTANEOUS WEEKLY
Qty: 3 ML | Refills: 11 | OUTPATIENT
Start: 2025-07-08

## 2025-08-04 ENCOUNTER — LAB (OUTPATIENT)
Dept: LAB | Facility: CLINIC | Age: 55
End: 2025-08-04
Payer: COMMERCIAL

## 2025-08-04 DIAGNOSIS — R79.89 LOW TESTOSTERONE IN MALE: ICD-10-CM

## 2025-08-04 LAB
PROLACTIN SERPL 3RD IS-MCNC: 9 NG/ML (ref 4–15)
SHBG SERPL-SCNC: 34 NMOL/L (ref 11–80)

## 2025-08-04 PROCEDURE — 36415 COLL VENOUS BLD VENIPUNCTURE: CPT

## 2025-08-04 PROCEDURE — 84270 ASSAY OF SEX HORMONE GLOBUL: CPT

## 2025-08-04 PROCEDURE — 84403 ASSAY OF TOTAL TESTOSTERONE: CPT

## 2025-08-04 PROCEDURE — 84146 ASSAY OF PROLACTIN: CPT

## 2025-08-06 LAB
TESTOST FREE SERPL-MCNC: 9.26 NG/DL
TESTOST SERPL-MCNC: 455 NG/DL (ref 240–950)

## 2025-08-12 DIAGNOSIS — F43.21 ADJUSTMENT DISORDER WITH DEPRESSED MOOD: ICD-10-CM

## 2025-08-12 RX ORDER — BUPROPION HYDROCHLORIDE 150 MG/1
150 TABLET ORAL EVERY MORNING
Qty: 90 TABLET | Refills: 1 | Status: SHIPPED | OUTPATIENT
Start: 2025-08-12

## 2025-09-03 ENCOUNTER — PATIENT OUTREACH (OUTPATIENT)
Dept: CARE COORDINATION | Facility: CLINIC | Age: 55
End: 2025-09-03
Payer: COMMERCIAL